# Patient Record
Sex: FEMALE | Race: WHITE | NOT HISPANIC OR LATINO | Employment: OTHER | URBAN - METROPOLITAN AREA
[De-identification: names, ages, dates, MRNs, and addresses within clinical notes are randomized per-mention and may not be internally consistent; named-entity substitution may affect disease eponyms.]

---

## 2018-10-04 ENCOUNTER — HOSPITAL ENCOUNTER (OUTPATIENT)
Dept: LAB | Facility: HOSPITAL | Age: 28
Discharge: HOME | End: 2018-10-04
Attending: NURSE PRACTITIONER | Admitting: NURSE PRACTITIONER

## 2018-10-04 LAB
25(OH)D3 SERPL-MCNC: 19 NG/ML (ref 30–100)
ALBUMIN SERPL-MCNC: 3.2 G/DL (ref 3.5–4.8)
ALBUMIN/GLOB SERPL: 1 {RATIO} (ref 1–1.7)
ALP SERPL-CCNC: 67 IU/L (ref 32–91)
ALT SERPL-CCNC: 20 IU/L (ref 14–54)
ANION GAP SERPL CALC-SCNC: 14.6 MMOL/L (ref 10–20)
AST SERPL-CCNC: 19 IU/L (ref 15–41)
BASOPHILS # BLD AUTO: 0.1 10*3/UL (ref 0–0.2)
BASOPHILS NFR BLD AUTO: 1 % (ref 0–2)
BILIRUB SERPL-MCNC: 0.5 MG/DL (ref 0.3–1.2)
BUN SERPL-MCNC: 10 MG/DL (ref 8–20)
BUN/CREAT SERPL: 14.3 (ref 5.4–26.2)
CALCIUM SERPL-MCNC: 9 MG/DL (ref 8.9–10.3)
CHLORIDE SERPL-SCNC: 102 MMOL/L (ref 101–111)
CHOLEST SERPL-MCNC: 206 MG/DL
CHOLEST/HDLC SERPL: 7.9 {RATIO}
CONV CO2: 27 MMOL/L (ref 22–32)
CONV LDL CHOLESTEROL DIRECT: 172 MG/DL (ref 0–100)
CONV TEST ORDERED:: NORMAL
CONV TOTAL PROTEIN: 6.4 G/DL (ref 6.1–7.9)
CREAT UR-MCNC: 0.7 MG/DL (ref 0.4–1)
DIFFERENTIAL METHOD BLD: (no result)
EOSINOPHIL # BLD AUTO: 0.1 10*3/UL (ref 0–0.3)
EOSINOPHIL # BLD AUTO: 1 % (ref 0–3)
ERYTHROCYTE [DISTWIDTH] IN BLOOD BY AUTOMATED COUNT: 16 % (ref 11.5–14.5)
ESTRADIOL SERPL HS-MCNC: 49 PG/ML
GLOBULIN UR ELPH-MCNC: 3.2 G/DL (ref 2.5–3.8)
GLUCOSE SERPL-MCNC: 187 MG/DL (ref 65–99)
HBA1C MFR BLD: 5.9 % (ref 0–5.6)
HBV SURFACE AG SERPL QL IA: NONREACTIVE
HCT VFR BLD AUTO: 39.4 % (ref 35–49)
HCV AB SER DONR QL: NONREACTIVE
HDLC SERPL-MCNC: 26 MG/DL
HGB BLD-MCNC: 12.7 G/DL (ref 12–15)
LDLC/HDLC SERPL: 6.5 {RATIO}
LIPID INTERPRETATION: ABNORMAL
LYMPHOCYTES # BLD AUTO: 2.2 10*3/UL (ref 0.8–4.8)
LYMPHOCYTES NFR BLD AUTO: 25 % (ref 18–42)
Lab: NORMAL
MCH RBC QN AUTO: 24.5 PG (ref 26–32)
MCHC RBC AUTO-ENTMCNC: 32.3 G/DL (ref 32–36)
MCV RBC AUTO: 75.7 FL (ref 80–94)
MONOCYTES # BLD AUTO: 0.3 10*3/UL (ref 0.1–1.3)
MONOCYTES NFR BLD AUTO: 3 % (ref 2–11)
NEUTROPHILS # BLD AUTO: 6.4 10*3/UL (ref 2.3–8.6)
NEUTROPHILS NFR BLD AUTO: 70 % (ref 50–75)
NRBC BLD AUTO-RTO: 0 /100{WBCS}
NRBC/RBC NFR BLD MANUAL: 0 10*3/UL
PLATELET # BLD AUTO: 366 10*3/UL (ref 150–450)
PMV BLD AUTO: 6.4 FL (ref 7.4–10.4)
POTASSIUM SERPL-SCNC: 3.6 MMOL/L (ref 3.6–5.1)
PROLACTIN SERPL-MCNC: 8 NG/ML (ref 3.3–27)
RBC # BLD AUTO: 5.21 10*6/UL (ref 4–5.4)
REF LAB TEST RESULTS: NORMAL
SODIUM SERPL-SCNC: 140 MMOL/L (ref 136–144)
T PALLIDUM IGG SER QL: NONREACTIVE
TRIGL SERPL-MCNC: 108 MG/DL
TSH SERPL-ACNC: 2.62 UIU/ML (ref 0.34–5.6)
VLDLC SERPL CALC-MCNC: 8.2 MG/DL
WBC # BLD AUTO: 9.1 10*3/UL (ref 4.5–11.5)

## 2018-10-05 LAB — CONV HIV-1/ HIV-2: NEGATIVE

## 2018-12-17 ENCOUNTER — HOSPITAL ENCOUNTER (OUTPATIENT)
Dept: ULTRASOUND IMAGING | Facility: HOSPITAL | Age: 28
Discharge: HOME | End: 2018-12-17
Attending: NURSE PRACTITIONER | Admitting: NURSE PRACTITIONER

## 2019-05-09 ENCOUNTER — HOSPITAL ENCOUNTER (OUTPATIENT)
Dept: ULTRASOUND IMAGING | Facility: HOSPITAL | Age: 29
Discharge: HOME | End: 2019-05-09
Attending: OBSTETRICS & GYNECOLOGY | Admitting: OBSTETRICS & GYNECOLOGY

## 2019-07-01 ENCOUNTER — OFFICE VISIT (OUTPATIENT)
Dept: FAMILY MEDICINE CLINIC | Facility: CLINIC | Age: 29
End: 2019-07-01

## 2019-07-01 VITALS
WEIGHT: 293 LBS | BODY MASS INDEX: 51.91 KG/M2 | SYSTOLIC BLOOD PRESSURE: 137 MMHG | DIASTOLIC BLOOD PRESSURE: 97 MMHG | HEIGHT: 63 IN | OXYGEN SATURATION: 95 % | HEART RATE: 73 BPM

## 2019-07-01 DIAGNOSIS — E66.01 CLASS 3 SEVERE OBESITY WITH BODY MASS INDEX (BMI) OF 45.0 TO 49.9 IN ADULT, UNSPECIFIED OBESITY TYPE, UNSPECIFIED WHETHER SERIOUS COMORBIDITY PRESENT (HCC): ICD-10-CM

## 2019-07-01 DIAGNOSIS — R53.83 FATIGUE, UNSPECIFIED TYPE: ICD-10-CM

## 2019-07-01 DIAGNOSIS — I10 HYPERTENSION, UNSPECIFIED TYPE: ICD-10-CM

## 2019-07-01 DIAGNOSIS — Z86.39 PERSONAL HISTORY OF OTHER ENDOCRINE, NUTRITIONAL AND METABOLIC DISEASE: ICD-10-CM

## 2019-07-01 DIAGNOSIS — F32.A DEPRESSION, UNSPECIFIED DEPRESSION TYPE: Primary | ICD-10-CM

## 2019-07-01 DIAGNOSIS — C55 MALIGNANT NEOPLASM OF UTERUS, UNSPECIFIED SITE (HCC): ICD-10-CM

## 2019-07-01 PROBLEM — F81.9 LEARNING DISABILITY: Status: ACTIVE | Noted: 2019-07-01

## 2019-07-01 PROCEDURE — 99204 OFFICE O/P NEW MOD 45 MIN: CPT | Performed by: NURSE PRACTITIONER

## 2019-07-01 RX ORDER — BUPROPION HYDROCHLORIDE 150 MG/1
150 TABLET ORAL DAILY
Qty: 30 TABLET | Refills: 0 | Status: SHIPPED | OUTPATIENT
Start: 2019-07-01 | End: 2019-07-25

## 2019-07-01 RX ORDER — MEGESTROL ACETATE 40 MG/1
2 TABLET ORAL 2 TIMES DAILY
Refills: 4 | COMMUNITY
Start: 2019-06-27 | End: 2020-04-19

## 2019-07-01 NOTE — PATIENT INSTRUCTIONS
Start taking wellbutrin daily  Work on increasing exercise  Work on portion control and eating well balanced diet  Call for worsening symptoms

## 2019-07-01 NOTE — PROGRESS NOTES
"Subjective   Cindy Thompson is a 28 y.o. female.     Pt is here today to establish care.  She has not had a PCP in years  She is , no children  She is unemloyeed  Pt states that she has a learning disability.  She completed high school but did not get her diploma  She reports being diagnosed with uterine cancer on 6/22.  She had a D & C for bleeding and cramping.  She sees Dr. Belle with [facility]  She goes to [facility] who started her on megace. (Dr. Mccauley)  They do not plan on removing the uterus at this time or starting chemo or radiation  She is supposed to be working on a diet and losing weight.  She reports that is very tough for her to diet.  She states that food is her \"drug.\"  She reports that her feet are swelling at night   She has been to the eye doctor within the last year  Occasional blurred vision  Has struggled with depression and has cut herself in 2017.  Not currently on any medication  Reports LLE edema at night           The following portions of the patient's history were reviewed and updated as appropriate: allergies, current medications, past family history, past medical history, past social history, past surgical history and problem list.    Review of Systems   Constitutional: Positive for appetite change (increase) and fatigue. Negative for chills and fever.   HENT: Negative for congestion, ear pain, hearing loss, postnasal drip, rhinorrhea, sinus pressure, sore throat and trouble swallowing.    Eyes: Positive for blurred vision. Negative for double vision, pain, discharge, itching and visual disturbance.   Respiratory: Negative for cough, chest tightness, shortness of breath and wheezing.    Cardiovascular: Positive for leg swelling. Negative for chest pain and palpitations.   Gastrointestinal: Positive for abdominal pain, constipation and nausea. Negative for blood in stool, diarrhea and vomiting.   Endocrine: Positive for polyphagia and polyuria. Negative for " "polydipsia.   Genitourinary: Positive for pelvic pain. Negative for dysuria, flank pain, frequency and urgency.   Musculoskeletal: Negative for arthralgias, back pain and myalgias.   Skin: Negative for rash and skin lesions.   Neurological: Positive for numbness (in manav feet). Negative for dizziness, weakness and headache.   Psychiatric/Behavioral: Positive for self-injury (has cut herself in 2017), suicidal ideas (at times.) and depressed mood. Negative for stress. The patient is not nervous/anxious.        Objective   /97 (BP Location: Left arm, Patient Position: Sitting, Cuff Size: Adult)   Pulse 73   Ht 160 cm (63\")   Wt (!) 159 kg (351 lb)   SpO2 95%   BMI 62.18 kg/m²   Physical Exam   Constitutional: She is oriented to person, place, and time. She appears well-developed.   obesity   HENT:   Head: Normocephalic and atraumatic.   Eyes: EOM are normal. Pupils are equal, round, and reactive to light.   Neck: Normal range of motion. Neck supple.   Cardiovascular: Normal rate, regular rhythm and normal heart sounds.   Pulmonary/Chest: Effort normal and breath sounds normal.   Abdominal: Soft. Bowel sounds are normal. There is tenderness.   Musculoskeletal: Normal range of motion. She exhibits edema (LLE trace edema).   Neurological: She is alert and oriented to person, place, and time.   Skin: Skin is warm and dry.   Psychiatric: Her speech is normal and behavior is normal. Judgment and thought content normal. Cognition and memory are normal. She exhibits a depressed mood.   Depressed affect   Nursing note and vitals reviewed.        Assessment/Plan   Problems Addressed this Visit        Cardiovascular and Mediastinum    Hypertension       Genitourinary    Uterine cancer (CMS/HCC)    Relevant Medications    megestrol (MEGACE) 40 MG tablet       Other    Depression - Primary    Relevant Medications    buPROPion XL (WELLBUTRIN XL) 150 MG 24 hr tablet      Other Visit Diagnoses     Class 3 severe obesity " with body mass index (BMI) of 45.0 to 49.9 in adult, unspecified obesity type, unspecified whether serious comorbidity present (CMS/[facility])        work on diet and exercise  obtain labs  set obtainable goals- exercise 10 minutes daily  wellbutrin      Relevant Orders    Lipid Panel    Hemoglobin A1c    TSH    Comprehensive Metabolic Panel    CBC & Differential    Fatigue, unspecified type        obtain labs    Relevant Orders    Lipid Panel    Hemoglobin A1c    TSH    Comprehensive Metabolic Panel    CBC & Differential    Personal history of other endocrine, nutritional and metabolic disease         labs    Relevant Orders    Hemoglobin A1c              Diagnoses and all orders for this visit:    1. Depression, unspecified depression type (Primary)  Comments:  start Wellbutrin.  Follow up in 3 weeks  Orders:  -     buPROPion XL (WELLBUTRIN XL) 150 MG 24 hr tablet; Take 1 tablet by mouth Daily.  Dispense: 30 tablet; Refill: 0    2. Class 3 severe obesity with body mass index (BMI) of 45.0 to 49.9 in adult, unspecified obesity type, unspecified whether serious comorbidity present (CMS/[facility])  Comments:  work on diet and exercise  obtain labs  set obtainable goals- exercise 10 minutes daily  wellbutrin    Orders:  -     Lipid Panel; Future  -     Hemoglobin A1c; Future  -     TSH; Future  -     Comprehensive Metabolic Panel; Future  -     CBC & Differential    3. Fatigue, unspecified type  Comments:  obtain labs  Orders:  -     Lipid Panel; Future  -     Hemoglobin A1c; Future  -     TSH; Future  -     Comprehensive Metabolic Panel; Future  -     CBC & Differential    4. Malignant neoplasm of uterus, unspecified site (CMS/[facility])  Comments:  [facility]    5. Hypertension, unspecified type  Comments:  monitor- may need to start medication    6. Personal history of other endocrine, nutritional and metabolic disease   Comments:  labs  Orders:  -     Hemoglobin A1c; Future

## 2019-07-03 ENCOUNTER — LAB (OUTPATIENT)
Dept: FAMILY MEDICINE CLINIC | Facility: CLINIC | Age: 29
End: 2019-07-03

## 2019-07-03 DIAGNOSIS — E66.01 CLASS 3 SEVERE OBESITY WITH BODY MASS INDEX (BMI) OF 45.0 TO 49.9 IN ADULT, UNSPECIFIED OBESITY TYPE, UNSPECIFIED WHETHER SERIOUS COMORBIDITY PRESENT (HCC): ICD-10-CM

## 2019-07-03 DIAGNOSIS — R53.83 FATIGUE, UNSPECIFIED TYPE: ICD-10-CM

## 2019-07-03 DIAGNOSIS — E03.9 HYPOTHYROIDISM, UNSPECIFIED TYPE: ICD-10-CM

## 2019-07-03 DIAGNOSIS — Z86.39 PERSONAL HISTORY OF OTHER ENDOCRINE, NUTRITIONAL AND METABOLIC DISEASE: ICD-10-CM

## 2019-07-03 LAB
ALBUMIN SERPL-MCNC: 3.3 G/DL (ref 3.5–4.8)
ALBUMIN/GLOB SERPL: 1 G/DL (ref 1–1.7)
ALP SERPL-CCNC: 61 U/L (ref 32–91)
ALT SERPL W P-5'-P-CCNC: 21 U/L (ref 14–54)
ANION GAP SERPL CALCULATED.3IONS-SCNC: 12.1 MMOL/L (ref 10–20)
ARTICHOKE IGE QN: 159 MG/DL (ref 0–100)
AST SERPL-CCNC: 18 U/L (ref 15–41)
BASOPHILS # BLD AUTO: 0.1 10*3/MM3 (ref 0–0.2)
BASOPHILS NFR BLD AUTO: 0.7 % (ref 0–1.5)
BILIRUB SERPL-MCNC: 0.3 MG/DL (ref 0.3–1.2)
BUN BLD-MCNC: 9 MG/DL (ref 8–20)
BUN/CREAT SERPL: 12.9 (ref 5.4–26.2)
CALCIUM SPEC-SCNC: 9.1 MG/DL (ref 8.9–10.3)
CHLORIDE SERPL-SCNC: 103 MMOL/L (ref 101–111)
CHOLEST SERPL-MCNC: 194 MG/DL
CO2 SERPL-SCNC: 26 MMOL/L (ref 22–32)
CREAT BLD-MCNC: 0.7 MG/DL (ref 0.4–1)
DEPRECATED RDW RBC AUTO: 48.1 FL (ref 37–54)
EOSINOPHIL # BLD AUTO: 0.2 10*3/MM3 (ref 0–0.4)
EOSINOPHIL NFR BLD AUTO: 1.6 % (ref 0.3–6.2)
ERYTHROCYTE [DISTWIDTH] IN BLOOD BY AUTOMATED COUNT: 17.5 % (ref 12.3–15.4)
GFR SERPL CREATININE-BSD FRML MDRD: 101 ML/MIN/1.73
GLOBULIN UR ELPH-MCNC: 3.4 GM/DL (ref 2.5–3.8)
GLUCOSE BLD-MCNC: 128 MG/DL (ref 65–99)
HBA1C MFR BLD: 6 % (ref 3.5–5.6)
HCT VFR BLD AUTO: 38.1 % (ref 34–46.6)
HDLC SERPL QL: 7.46
HDLC SERPL-MCNC: 26 MG/DL
HGB BLD-MCNC: 12.3 G/DL (ref 12–15.9)
LDLC/HDLC SERPL: 5.33 {RATIO}
LYMPHOCYTES # BLD AUTO: 1.8 10*3/MM3 (ref 0.7–3.1)
LYMPHOCYTES NFR BLD AUTO: 19.5 % (ref 19.6–45.3)
MCH RBC QN AUTO: 24.7 PG (ref 26.6–33)
MCHC RBC AUTO-ENTMCNC: 32.3 G/DL (ref 31.5–35.7)
MCV RBC AUTO: 76.7 FL (ref 79–97)
MONOCYTES # BLD AUTO: 0.3 10*3/MM3 (ref 0.1–0.9)
MONOCYTES NFR BLD AUTO: 3.7 % (ref 5–12)
NEUTROPHILS # BLD AUTO: 6.9 10*3/MM3 (ref 1.7–7)
NEUTROPHILS NFR BLD AUTO: 74.5 % (ref 42.7–76)
NRBC BLD AUTO-RTO: 0.2 /100 WBC (ref 0–0.2)
PLATELET # BLD AUTO: 373 10*3/MM3 (ref 140–450)
PMV BLD AUTO: 6.8 FL (ref 6–12)
POTASSIUM BLD-SCNC: 4.1 MMOL/L (ref 3.6–5.1)
PROT SERPL-MCNC: 6.7 G/DL (ref 6.1–7.9)
RBC # BLD AUTO: 4.97 10*6/MM3 (ref 3.77–5.28)
SODIUM BLD-SCNC: 137 MMOL/L (ref 136–144)
TRIGL SERPL-MCNC: 147 MG/DL
TSH SERPL DL<=0.05 MIU/L-ACNC: 7.6 MIU/ML (ref 0.34–5.6)
VLDLC SERPL-MCNC: 29.4 MG/DL
WBC NRBC COR # BLD: 9.3 10*3/MM3 (ref 3.4–10.8)

## 2019-07-03 PROCEDURE — 36415 COLL VENOUS BLD VENIPUNCTURE: CPT | Performed by: NURSE PRACTITIONER

## 2019-07-03 PROCEDURE — 84439 ASSAY OF FREE THYROXINE: CPT | Performed by: NURSE PRACTITIONER

## 2019-07-03 PROCEDURE — 80053 COMPREHEN METABOLIC PANEL: CPT | Performed by: NURSE PRACTITIONER

## 2019-07-03 PROCEDURE — 80061 LIPID PANEL: CPT | Performed by: NURSE PRACTITIONER

## 2019-07-03 PROCEDURE — 85025 COMPLETE CBC W/AUTO DIFF WBC: CPT | Performed by: NURSE PRACTITIONER

## 2019-07-03 PROCEDURE — 84443 ASSAY THYROID STIM HORMONE: CPT | Performed by: NURSE PRACTITIONER

## 2019-07-03 PROCEDURE — 83036 HEMOGLOBIN GLYCOSYLATED A1C: CPT | Performed by: NURSE PRACTITIONER

## 2019-07-05 DIAGNOSIS — E03.9 HYPOTHYROIDISM, UNSPECIFIED TYPE: Primary | ICD-10-CM

## 2019-07-05 LAB — T4 FREE SERPL-MCNC: 0.88 NG/DL (ref 0.58–1.64)

## 2019-07-05 RX ORDER — LEVOTHYROXINE SODIUM 0.05 MG/1
50 TABLET ORAL DAILY
Qty: 30 TABLET | Refills: 1 | Status: SHIPPED | OUTPATIENT
Start: 2019-07-05 | End: 2019-08-26

## 2019-07-25 ENCOUNTER — OFFICE VISIT (OUTPATIENT)
Dept: FAMILY MEDICINE CLINIC | Facility: CLINIC | Age: 29
End: 2019-07-25

## 2019-07-25 VITALS
BODY MASS INDEX: 51.91 KG/M2 | SYSTOLIC BLOOD PRESSURE: 149 MMHG | HEIGHT: 63 IN | WEIGHT: 293 LBS | DIASTOLIC BLOOD PRESSURE: 104 MMHG | OXYGEN SATURATION: 97 % | HEART RATE: 98 BPM

## 2019-07-25 DIAGNOSIS — I10 HYPERTENSION, UNSPECIFIED TYPE: ICD-10-CM

## 2019-07-25 DIAGNOSIS — R10.84 GENERALIZED ABDOMINAL PAIN: ICD-10-CM

## 2019-07-25 DIAGNOSIS — Z23 ENCOUNTER FOR VACCINATION: ICD-10-CM

## 2019-07-25 DIAGNOSIS — F32.A DEPRESSION, UNSPECIFIED DEPRESSION TYPE: Primary | ICD-10-CM

## 2019-07-25 PROBLEM — K08.9 TOOTH DISORDER: Status: ACTIVE | Noted: 2017-12-30

## 2019-07-25 PROBLEM — G43.909 HEADACHE, MIGRAINE: Status: ACTIVE | Noted: 2019-07-25

## 2019-07-25 LAB
BILIRUB BLD-MCNC: NEGATIVE MG/DL
CLARITY, POC: CLEAR
COLOR UR: YELLOW
GLUCOSE UR STRIP-MCNC: NEGATIVE MG/DL
KETONES UR QL: NEGATIVE
LEUKOCYTE EST, POC: NEGATIVE
NITRITE UR-MCNC: NEGATIVE MG/ML
PH UR: 5.5 [PH] (ref 5–8)
PROT UR STRIP-MCNC: ABNORMAL MG/DL
RBC # UR STRIP: NEGATIVE /UL
SP GR UR: 1.03 (ref 1–1.03)
UROBILINOGEN UR QL: NORMAL

## 2019-07-25 PROCEDURE — 90471 IMMUNIZATION ADMIN: CPT | Performed by: NURSE PRACTITIONER

## 2019-07-25 PROCEDURE — 99214 OFFICE O/P EST MOD 30 MIN: CPT | Performed by: NURSE PRACTITIONER

## 2019-07-25 PROCEDURE — 81003 URINALYSIS AUTO W/O SCOPE: CPT | Performed by: NURSE PRACTITIONER

## 2019-07-25 PROCEDURE — 90715 TDAP VACCINE 7 YRS/> IM: CPT | Performed by: NURSE PRACTITIONER

## 2019-07-25 RX ORDER — BUPROPION HYDROCHLORIDE 300 MG/1
300 TABLET ORAL DAILY
Qty: 30 TABLET | Refills: 2 | Status: SHIPPED | OUTPATIENT
Start: 2019-07-25 | End: 2020-02-09 | Stop reason: SDUPTHER

## 2019-07-25 RX ORDER — LABETALOL 100 MG/1
100 TABLET, FILM COATED ORAL 2 TIMES DAILY
Qty: 60 TABLET | Refills: 0 | Status: SHIPPED | OUTPATIENT
Start: 2019-07-25 | End: 2019-08-26

## 2019-07-25 NOTE — PATIENT INSTRUCTIONS
Check TSH (thyroid) during the week of 8/16   Drink plenty of water  Take over the counter stool softener (docusate sodium) twice daily  Try taking miralax twice daily  Prune juice is ok to take as well.  If nausea does not improve after constipation improves let me know  Start taking labetolol for elevated blood pressure  Reduce sodium intake  Tdap vaccine given today  Increase wellbutrin to 300mg daily

## 2019-07-25 NOTE — PROGRESS NOTES
"Subjective   Cindy Thompson is a 28 y.o. female.     Pt is here today to follow up on depression.  We started her on wellbutrin [id number] and she states that she has seen an improvement in her mood  She would be interested in increasing the dose  Denies thoughts of suicide  She has also been trying to work on her diet and increasing her exercise but she states that it has been \"hard\"  She has reduced her soda intake  She is down 9lbs since the last visit  She was started on synthroid for hypothyroidisms.  She is tolerating the synthroid well.  She is due to have her thyroid checked on 8/16  Pt states that she has been experiencing nausea on a daily basis.  She still has a good appetite.  It happens throughout the day  Pt states that she has been struggling with constipation and a burning sensation in her rectum with bowel movements.  This has been happening for about 3 weeks.  She states that she is consuming a large amount of water.  She was going to try prune juice  She was recently diagnosed with uterine cancer and sees Dr. Mccauley at [facility].  Next appt is in October.         The following portions of the patient's history were reviewed and updated as appropriate: allergies, current medications, past family history, past medical history, past social history, past surgical history and problem list.    Review of Systems   Constitutional: Positive for fatigue. Negative for appetite change, chills and fever.   HENT: Negative for congestion, ear pain, hearing loss, postnasal drip, rhinorrhea, sinus pressure, sore throat and trouble swallowing.    Eyes: Positive for blurred vision (occasional). Negative for double vision and discharge.   Respiratory: Negative for cough, chest tightness, shortness of breath and wheezing.    Cardiovascular: Negative for chest pain and palpitations.   Gastrointestinal: Positive for abdominal pain, constipation and nausea. Negative for blood in stool, diarrhea and vomiting. Abdominal " "distention: occasional.   Genitourinary: Negative for dysuria, flank pain, frequency and urgency.   Musculoskeletal: Negative for arthralgias, back pain and myalgias.   Neurological: Negative for dizziness, weakness, numbness and headache.   Psychiatric/Behavioral: Positive for depressed mood. Negative for sleep disturbance and stress. The patient is not nervous/anxious.        Objective   BP (!) 149/104 (BP Location: Other (Comment), Patient Position: Sitting, Cuff Size: Adult) Comment (BP Location): right wrist  Pulse 98   Ht 160 cm (63\")   Wt (!) 155 kg (342 lb)   SpO2 97%   BMI 60.58 kg/m²   Physical Exam   Constitutional: She is oriented to person, place, and time. She appears well-developed and well-nourished. No distress.   obese   HENT:   Head: Normocephalic and atraumatic.   Eyes: Conjunctivae and EOM are normal. Pupils are equal, round, and reactive to light. Right eye exhibits no discharge. Left eye exhibits no discharge.   Neck: Normal range of motion. Neck supple.   Cardiovascular: Normal rate and regular rhythm.   Pulmonary/Chest: Effort normal and breath sounds normal. No respiratory distress. She has no wheezes. She has no rales.   Abdominal: Soft. Normal appearance and bowel sounds are normal. She exhibits no distension and no mass. There is no tenderness.   Musculoskeletal: Normal range of motion.   Neurological: She is alert and oriented to person, place, and time.   Skin: Skin is warm and dry. She is not diaphoretic.   Psychiatric: She has a normal mood and affect. Her behavior is normal. Thought content normal.   Vitals reviewed.      Brief Urine Lab Results  (Last result in the past 365 days)      Color   Clarity   Blood   Leuk Est   Nitrite   Protein   CREAT   Urine HCG        07/25/19 0911 Yellow Clear Negative Negative Negative 30 mg/dL             Assessment/Plan   Problems Addressed this Visit        Cardiovascular and Mediastinum    Hypertension       Nervous and Auditory    " Abdominal pain    Relevant Orders    POC Urinalysis Dipstick, Automated (Completed)       Other    Depression - Primary      Other Visit Diagnoses     Encounter for vaccination                  Diagnoses and all orders for this visit:    1. Depression, unspecified depression type (Primary)  Comments:  improved  increase wellbutrin to 300 mg daily    2. Hypertension, unspecified type  Comments:  start labetolol 100 mg BID  reduce sodium intake    3. Generalized abdominal pain  Comments:  check UA  most likely constipation  start miralax, colace, prune juice and drink water  Orders:  -     POC Urinalysis Dipstick, Automated    4. Encounter for vaccination  -     Tdap Vaccine Greater Than or Equal To 8yo IM      I called [facility] and left voicemail wit their NP to discuss possible other options for treatment other than Megace.  Pt reports that she is constantly hungry on the medication.  Awaiting return call.

## 2019-07-29 ENCOUNTER — TELEPHONE (OUTPATIENT)
Dept: FAMILY MEDICINE CLINIC | Facility: CLINIC | Age: 29
End: 2019-07-29

## 2019-07-29 NOTE — TELEPHONE ENCOUNTER
Pt called, wellbutrin & labetalol are too expensive through insurance. I spoke w/ pt and printed goodrx card for pt to . They are up front.

## 2019-08-17 ENCOUNTER — HOSPITAL ENCOUNTER (EMERGENCY)
Facility: HOSPITAL | Age: 29
Discharge: HOME | End: 2019-08-17
Admitting: EMERGENCY MEDICINE

## 2019-08-17 ENCOUNTER — APPOINTMENT (OUTPATIENT)
Dept: CT IMAGING | Facility: HOSPITAL | Age: 29
End: 2019-08-17

## 2019-08-17 VITALS
WEIGHT: 293 LBS | OXYGEN SATURATION: 97 % | HEART RATE: 88 BPM | DIASTOLIC BLOOD PRESSURE: 87 MMHG | SYSTOLIC BLOOD PRESSURE: 128 MMHG | BODY MASS INDEX: 51.91 KG/M2 | HEIGHT: 63 IN | RESPIRATION RATE: 17 BRPM | TEMPERATURE: 98.3 F

## 2019-08-17 DIAGNOSIS — R11.0 NAUSEA: ICD-10-CM

## 2019-08-17 DIAGNOSIS — K92.1 HEMATOCHEZIA: ICD-10-CM

## 2019-08-17 DIAGNOSIS — R10.84 GENERALIZED ABDOMINAL PAIN: Primary | ICD-10-CM

## 2019-08-17 LAB
ALBUMIN SERPL-MCNC: 3.5 G/DL (ref 3.5–4.8)
ALBUMIN/GLOB SERPL: 1 G/DL (ref 1–1.7)
ALP SERPL-CCNC: 71 U/L (ref 32–91)
ALT SERPL W P-5'-P-CCNC: 17 U/L (ref 14–54)
ANION GAP SERPL CALCULATED.3IONS-SCNC: 14.5 MMOL/L (ref 5–15)
AST SERPL-CCNC: 15 U/L (ref 15–41)
B-HCG UR QL: NEGATIVE
BACTERIA UR QL AUTO: ABNORMAL /HPF
BASOPHILS # BLD AUTO: 0.1 10*3/MM3 (ref 0–0.2)
BASOPHILS NFR BLD AUTO: 1.1 % (ref 0–1.5)
BILIRUB SERPL-MCNC: 0.7 MG/DL (ref 0.3–1.2)
BILIRUB UR QL STRIP: ABNORMAL
BUN BLD-MCNC: 8 MG/DL (ref 8–20)
BUN/CREAT SERPL: 8 (ref 5.4–26.2)
CALCIUM SPEC-SCNC: 9.5 MG/DL (ref 8.9–10.3)
CHLORIDE SERPL-SCNC: 102 MMOL/L (ref 101–111)
CLARITY UR: CLEAR
CO2 SERPL-SCNC: 26 MMOL/L (ref 22–32)
COLOR UR: YELLOW
CREAT BLD-MCNC: 1 MG/DL (ref 0.4–1)
DEPRECATED RDW RBC AUTO: 44.2 FL (ref 37–54)
EOSINOPHIL # BLD AUTO: 0.1 10*3/MM3 (ref 0–0.4)
EOSINOPHIL NFR BLD AUTO: 1.4 % (ref 0.3–6.2)
ERYTHROCYTE [DISTWIDTH] IN BLOOD BY AUTOMATED COUNT: 16.6 % (ref 12.3–15.4)
GFR SERPL CREATININE-BSD FRML MDRD: 67 ML/MIN/1.73
GLOBULIN UR ELPH-MCNC: 3.5 GM/DL (ref 2.5–3.8)
GLUCOSE BLD-MCNC: 130 MG/DL (ref 65–99)
GLUCOSE UR STRIP-MCNC: NEGATIVE MG/DL
HCT VFR BLD AUTO: 40.3 % (ref 34–46.6)
HGB BLD-MCNC: 12.9 G/DL (ref 12–15.9)
HGB UR QL STRIP.AUTO: ABNORMAL
HOLD SPECIMEN: NORMAL
HOLD SPECIMEN: NORMAL
HYALINE CASTS UR QL AUTO: ABNORMAL /LPF
KETONES UR QL STRIP: ABNORMAL
LEUKOCYTE ESTERASE UR QL STRIP.AUTO: NEGATIVE
LIPASE SERPL-CCNC: 27 U/L (ref 22–51)
LYMPHOCYTES # BLD AUTO: 2.3 10*3/MM3 (ref 0.7–3.1)
LYMPHOCYTES NFR BLD AUTO: 22.9 % (ref 19.6–45.3)
MCH RBC QN AUTO: 24.2 PG (ref 26.6–33)
MCHC RBC AUTO-ENTMCNC: 32 G/DL (ref 31.5–35.7)
MCV RBC AUTO: 75.7 FL (ref 79–97)
MONOCYTES # BLD AUTO: 0.5 10*3/MM3 (ref 0.1–0.9)
MONOCYTES NFR BLD AUTO: 4.9 % (ref 5–12)
MUCOUS THREADS URNS QL MICRO: ABNORMAL /HPF
NEUTROPHILS # BLD AUTO: 7 10*3/MM3 (ref 1.7–7)
NEUTROPHILS NFR BLD AUTO: 69.7 % (ref 42.7–76)
NITRITE UR QL STRIP: NEGATIVE
NRBC BLD AUTO-RTO: 0 /100 WBC (ref 0–0.2)
PH UR STRIP.AUTO: 6 [PH] (ref 5–8)
PLATELET # BLD AUTO: 381 10*3/MM3 (ref 140–450)
PMV BLD AUTO: 6.4 FL (ref 6–12)
POTASSIUM BLD-SCNC: 3.5 MMOL/L (ref 3.6–5.1)
PROT SERPL-MCNC: 7 G/DL (ref 6.1–7.9)
PROT UR QL STRIP: ABNORMAL
RBC # BLD AUTO: 5.32 10*6/MM3 (ref 3.77–5.28)
RBC # UR: ABNORMAL /HPF
REF LAB TEST METHOD: ABNORMAL
SODIUM BLD-SCNC: 139 MMOL/L (ref 136–144)
SP GR UR STRIP: 1.04 (ref 1–1.03)
SQUAMOUS #/AREA URNS HPF: ABNORMAL /HPF
UROBILINOGEN UR QL STRIP: ABNORMAL
WBC NRBC COR # BLD: 10 10*3/MM3 (ref 3.4–10.8)
WBC UR QL AUTO: ABNORMAL /HPF
WHOLE BLOOD HOLD SPECIMEN: NORMAL
WHOLE BLOOD HOLD SPECIMEN: NORMAL

## 2019-08-17 PROCEDURE — 25010000002 ONDANSETRON PER 1 MG: Performed by: NURSE PRACTITIONER

## 2019-08-17 PROCEDURE — 87086 URINE CULTURE/COLONY COUNT: CPT | Performed by: NURSE PRACTITIONER

## 2019-08-17 PROCEDURE — 96372 THER/PROPH/DIAG INJ SC/IM: CPT

## 2019-08-17 PROCEDURE — 85025 COMPLETE CBC W/AUTO DIFF WBC: CPT | Performed by: NURSE PRACTITIONER

## 2019-08-17 PROCEDURE — 0 IOPAMIDOL PER 1 ML: Performed by: NURSE PRACTITIONER

## 2019-08-17 PROCEDURE — 25010000002 DICYCLOMINE PER 20 MG: Performed by: NURSE PRACTITIONER

## 2019-08-17 PROCEDURE — 96361 HYDRATE IV INFUSION ADD-ON: CPT

## 2019-08-17 PROCEDURE — 81025 URINE PREGNANCY TEST: CPT | Performed by: NURSE PRACTITIONER

## 2019-08-17 PROCEDURE — 74177 CT ABD & PELVIS W/CONTRAST: CPT

## 2019-08-17 PROCEDURE — 83690 ASSAY OF LIPASE: CPT | Performed by: NURSE PRACTITIONER

## 2019-08-17 PROCEDURE — 99284 EMERGENCY DEPT VISIT MOD MDM: CPT

## 2019-08-17 PROCEDURE — 80053 COMPREHEN METABOLIC PANEL: CPT | Performed by: NURSE PRACTITIONER

## 2019-08-17 PROCEDURE — P9612 CATHETERIZE FOR URINE SPEC: HCPCS

## 2019-08-17 PROCEDURE — 81001 URINALYSIS AUTO W/SCOPE: CPT | Performed by: NURSE PRACTITIONER

## 2019-08-17 PROCEDURE — 96374 THER/PROPH/DIAG INJ IV PUSH: CPT

## 2019-08-17 RX ORDER — OMEPRAZOLE 40 MG/1
40 CAPSULE, DELAYED RELEASE ORAL DAILY
Qty: 14 CAPSULE | Refills: 0 | Status: SHIPPED | OUTPATIENT
Start: 2019-08-17 | End: 2019-11-21

## 2019-08-17 RX ORDER — ONDANSETRON 2 MG/ML
4 INJECTION INTRAMUSCULAR; INTRAVENOUS ONCE
Status: COMPLETED | OUTPATIENT
Start: 2019-08-17 | End: 2019-08-17

## 2019-08-17 RX ORDER — SODIUM CHLORIDE 0.9 % (FLUSH) 0.9 %
10 SYRINGE (ML) INJECTION AS NEEDED
Status: DISCONTINUED | OUTPATIENT
Start: 2019-08-17 | End: 2019-08-17 | Stop reason: HOSPADM

## 2019-08-17 RX ORDER — DICYCLOMINE HYDROCHLORIDE 10 MG/ML
20 INJECTION INTRAMUSCULAR ONCE
Status: COMPLETED | OUTPATIENT
Start: 2019-08-17 | End: 2019-08-17

## 2019-08-17 RX ORDER — ONDANSETRON 4 MG/1
4 TABLET, ORALLY DISINTEGRATING ORAL EVERY 8 HOURS PRN
Qty: 10 TABLET | Refills: 0 | Status: SHIPPED | OUTPATIENT
Start: 2019-08-17 | End: 2020-04-19

## 2019-08-17 RX ORDER — DICYCLOMINE HCL 20 MG
20 TABLET ORAL EVERY 6 HOURS PRN
Qty: 20 TABLET | Refills: 0 | Status: SHIPPED | OUTPATIENT
Start: 2019-08-17 | End: 2020-09-23

## 2019-08-17 RX ADMIN — SODIUM CHLORIDE 1000 ML: 900 INJECTION, SOLUTION INTRAVENOUS at 16:43

## 2019-08-17 RX ADMIN — DICYCLOMINE HYDROCHLORIDE 20 MG: 20 INJECTION, SOLUTION INTRAMUSCULAR at 16:42

## 2019-08-17 RX ADMIN — ONDANSETRON 4 MG: 2 INJECTION INTRAMUSCULAR; INTRAVENOUS at 16:42

## 2019-08-17 RX ADMIN — IOPAMIDOL 100 ML: 755 INJECTION, SOLUTION INTRAVENOUS at 18:36

## 2019-08-17 NOTE — DISCHARGE INSTRUCTIONS
Take Zofran for nausea, Bentyl for abdominal cramping, and omeprazole as directed.  Take MiraLAX and fiber supplement over-the-counter every morning with stool softeners.  Drink lots of water and stay active.  Follow-up with your PCP in 3 days for recheck.  Follow-up with gastroenterology, as discussed.  Return to the ER for new or worsening symptoms.

## 2019-08-17 NOTE — ED PROVIDER NOTES
Subjective   History of Present Illness    Review of Systems    Past Medical History:   Diagnosis Date   • Depression    • Learning disabilities    • Low back pain    • Uterine cancer (CMS/HCC)     [facility]- Dr. Eva Belle       No Known Allergies    Past Surgical History:   Procedure Laterality Date   • DILATATION AND CURETTAGE     • TONSILLECTOMY         Family History   Problem Relation Age of Onset   • Cancer Father    • Hyperlipidemia Father    • Stroke Father    • Depression Sister    • Learning disabilities Sister    • Diabetes Maternal Grandmother        Social History     Socioeconomic History   • Marital status:      Spouse name: Not on file   • Number of children: Not on file   • Years of education: Not on file   • Highest education level: Not on file   Tobacco Use   • Smoking status: Former Smoker     Last attempt to quit:      Years since quittin.6   • Smokeless tobacco: Never Used   Substance and Sexual Activity   • Alcohol use: No     Frequency: Never     Comment: used to drink weekly   • Drug use: No   • Sexual activity: Yes     Partners: Male     Birth control/protection: None           Objective   Physical Exam    Procedures           ED Course      Ct Abdomen Pelvis With Contrast    Result Date: 2019   1. No acute abdominal or pelvic abnormality. 2. Mild cardiomegaly. 3. Solitary enlarged pericaval lymph node, likely reactive.  Electronically Signed By-Zurdo Brady On:2019 6:43 PM This report was finalized on [id number] by  Zurdo Brady, .    Medications   sodium chloride 0.9 % flush 10 mL (not administered)   sodium chloride 0.9 % bolus 1,000 mL (0 mL Intravenous Stopped 19 1758)   ondansetron (ZOFRAN) injection 4 mg (4 mg Intravenous Given 19 1642)   dicyclomine (BENTYL) injection 20 mg (20 mg Intramuscular Given 19 1642)   iopamidol (ISOVUE-370) 76 % injection 100 mL (100 mL Intravenous Given 19 1836)     Labs Reviewed   COMPREHENSIVE  METABOLIC PANEL - Abnormal; Notable for the following components:       Result Value    Glucose 130 (*)     Potassium 3.5 (*)     All other components within normal limits   URINALYSIS W/ CULTURE IF INDICATED - Abnormal; Notable for the following components:    Specific Gravity, UA 1.043 (*)     Ketones, UA Trace (*)     Bilirubin, UA Small (1+) (*)     Blood, UA Small (1+) (*)     Protein, UA 30 mg/dL (1+) (*)     All other components within normal limits   CBC WITH AUTO DIFFERENTIAL - Abnormal; Notable for the following components:    RBC 5.32 (*)     MCV 75.7 (*)     MCH 24.2 (*)     RDW 16.6 (*)     Monocyte % 4.9 (*)     All other components within normal limits   URINALYSIS, MICROSCOPIC ONLY - Abnormal; Notable for the following components:    WBC, UA 6-12 (*)     Bacteria, UA Trace (*)     Mucus, UA Large/3+ (*)     All other components within normal limits   LIPASE - Normal   PREGNANCY, URINE - Normal   URINE CULTURE   RAINBOW DRAW    Narrative:     The following orders were created for panel order [place] Draw.  Procedure                               Abnormality         Status                     ---------                               -----------         ------                     Light Blue Top[[id number]]                                   Final result               Green Top (Gel)[[id number]]                                  Final result               Lavender Top[[id number]]                                     Final result               Gold Top - SST[[id number]]                                   Final result                 Please view results for these tests on the individual orders.   CBC AND DIFFERENTIAL    Narrative:     The following orders were created for panel order CBC & Differential.  Procedure                               Abnormality         Status                     ---------                               -----------         ------                     CBC Auto Differential[[id number]]         Abnormal            Final result                 Please view results for these tests on the individual orders.   LIGHT BLUE TOP   GREEN TOP   LAVENDER TOP   GOLD TOP - RUST                 MDM  Number of Diagnoses or Management Options  Generalized abdominal pain:   Hematochezia:   Nausea:   Diagnosis management comments: Chart Review:  Comorbidity:  Imaging: Was interpreted by physician and reviewed by myself: Ct Abdomen Pelvis With Contrast Result Date: 8/17/2019   1. No acute abdominal or pelvic abnormality. 2. Mild cardiomegaly. 3. Solitary enlarged pericaval lymph node, likely reactive.    Disposition/Treatment: Discussed results with patient, verbalized understanding.  Agreeable with plan of care.    Patient undressed and placed in gown for exam.  Patient given 1 L bolus, Zofran 4 mg IV, and Bentyl 20 mg IM.  CT abdomen pelvis obtained.  Patient given follow-up with gastroenterology in the next week.  Patient given prescriptions for omeprazole, Bentyl, and Zofran.  Also instructed to take MiraLAX and fiber supplement with a stool softener once daily.  Patient instructed to return to the ER for new or worsening symptoms.         Amount and/or Complexity of Data Reviewed  Clinical lab tests: reviewed  Tests in the radiology section of CPT®: reviewed          Final diagnoses:   Generalized abdominal pain   Nausea   Hematochezia            Myriam Joseph, NP  08/17/19 6188

## 2019-08-19 LAB — BACTERIA SPEC AEROBE CULT: NO GROWTH

## 2019-08-26 ENCOUNTER — OFFICE VISIT (OUTPATIENT)
Dept: FAMILY MEDICINE CLINIC | Facility: CLINIC | Age: 29
End: 2019-08-26

## 2019-08-26 ENCOUNTER — LAB (OUTPATIENT)
Dept: FAMILY MEDICINE CLINIC | Facility: CLINIC | Age: 29
End: 2019-08-26

## 2019-08-26 VITALS
HEIGHT: 63 IN | OXYGEN SATURATION: 98 % | WEIGHT: 293 LBS | SYSTOLIC BLOOD PRESSURE: 137 MMHG | DIASTOLIC BLOOD PRESSURE: 98 MMHG | HEART RATE: 104 BPM | BODY MASS INDEX: 51.91 KG/M2

## 2019-08-26 DIAGNOSIS — E03.9 HYPOTHYROIDISM, UNSPECIFIED TYPE: Primary | ICD-10-CM

## 2019-08-26 DIAGNOSIS — E03.9 HYPOTHYROIDISM, UNSPECIFIED TYPE: ICD-10-CM

## 2019-08-26 DIAGNOSIS — I10 HYPERTENSION, UNSPECIFIED TYPE: Primary | ICD-10-CM

## 2019-08-26 DIAGNOSIS — F32.A DEPRESSION, UNSPECIFIED DEPRESSION TYPE: ICD-10-CM

## 2019-08-26 DIAGNOSIS — R10.2 PELVIC CRAMPING: ICD-10-CM

## 2019-08-26 PROBLEM — J02.9 SORE THROAT: Status: ACTIVE | Noted: 2018-02-05

## 2019-08-26 PROBLEM — Z32.00 POSSIBLE PREGNANCY, NOT CONFIRMED: Status: ACTIVE | Noted: 2018-02-05

## 2019-08-26 PROBLEM — Z32.00 POSSIBLE PREGNANCY, NOT CONFIRMED: Status: RESOLVED | Noted: 2018-02-05 | Resolved: 2019-08-26

## 2019-08-26 LAB
BILIRUB BLD-MCNC: NEGATIVE MG/DL
CLARITY, POC: CLEAR
COLOR UR: YELLOW
GLUCOSE UR STRIP-MCNC: NEGATIVE MG/DL
KETONES UR QL: NEGATIVE
LEUKOCYTE EST, POC: NEGATIVE
NITRITE UR-MCNC: NEGATIVE MG/ML
PH UR: 5.5 [PH] (ref 5–8)
PROT UR STRIP-MCNC: ABNORMAL MG/DL
RBC # UR STRIP: ABNORMAL /UL
SP GR UR: 1.02 (ref 1–1.03)
TSH SERPL DL<=0.05 MIU/L-ACNC: 4.54 MIU/ML (ref 0.34–5.6)
UROBILINOGEN UR QL: NORMAL

## 2019-08-26 PROCEDURE — 84443 ASSAY THYROID STIM HORMONE: CPT | Performed by: NURSE PRACTITIONER

## 2019-08-26 PROCEDURE — 36415 COLL VENOUS BLD VENIPUNCTURE: CPT

## 2019-08-26 PROCEDURE — 99214 OFFICE O/P EST MOD 30 MIN: CPT | Performed by: NURSE PRACTITIONER

## 2019-08-26 PROCEDURE — 81003 URINALYSIS AUTO W/O SCOPE: CPT | Performed by: NURSE PRACTITIONER

## 2019-08-26 RX ORDER — METOPROLOL SUCCINATE 50 MG/1
50 TABLET, EXTENDED RELEASE ORAL DAILY
Qty: 30 TABLET | Refills: 2 | Status: SHIPPED | OUTPATIENT
Start: 2019-08-26 | End: 2019-11-21 | Stop reason: SDUPTHER

## 2019-08-26 RX ORDER — LEVOTHYROXINE SODIUM 0.07 MG/1
75 TABLET ORAL DAILY
Qty: 30 TABLET | Refills: 1 | Status: SHIPPED | OUTPATIENT
Start: 2019-08-26 | End: 2019-11-24

## 2019-08-26 NOTE — PROGRESS NOTES
Subjective   Cindy Thompson is a 29 y.o. female.     Pt is here today to follow up on HTN, depression, and abdominal discomfort.  HTN- pt was prescribed labetolol 100mg BID.  She reports that she is inconsistent taking the medication regularly.  She always gets her first dose in, but does not get the second dose in regularly.  She forgets it more than she takes it.  Her BP is not stable at this time.  She has not had any BP medication today    Depression-  Pt reports that her depression symptoms are much better since increasing her wellbutrin to 300mg. She has more motivation to get up and move.  She is happy with her current dose.  She is not experiencing any side effects or thoughts of hurting self or others.  She is sleeping well.    She is still trying to lose weight.  She has decreased her soda intake and is trying to decrease her portion sizes.  She has not been getting much activity in due to her cramping.  She states that it hurts more the more she is up moving.  She has not gained or lost any weight since her last visit.    Pt reports that she has been having some abdominal discomfort.  She is currently on her menstrual cycle and experiencing extreme cramping.  She has been taking advil and midol, which have not helped.  She has been on her cycle for about 2 weeks.  She was experiencing cramping prior to her cycle.  She has an appointment with Dr. Greenberg on Sept 3rd.    She also reports that her constipation has improved.  She has been taking miralax and prune juice.  She is having a BM 5-6 times a day.  Her stools are formed.         The following portions of the patient's history were reviewed and updated as appropriate: allergies, current medications, past family history, past medical history, past social history, past surgical history and problem list.    Review of Systems   Constitutional: Negative for chills, fatigue and fever.   Respiratory: Negative for chest tightness and shortness of breath.   "  Cardiovascular: Negative for chest pain and palpitations.   Gastrointestinal: Positive for nausea. Negative for abdominal pain and constipation (improved).   Genitourinary: Positive for pelvic pain and pelvic pressure. Negative for dysuria, frequency and urgency.   Musculoskeletal: Negative for arthralgias and myalgias.   Neurological: Positive for weakness (legs feel more weak). Negative for dizziness, light-headedness and headache.   Psychiatric/Behavioral: Negative for self-injury, sleep disturbance, suicidal ideas, depressed mood and stress. The patient is not nervous/anxious.        Objective   /98 (BP Location: Left arm, Patient Position: Sitting, Cuff Size: Adult)   Pulse 104   Ht 160 cm (63\")   Wt (!) 156 kg (343 lb)   SpO2 98%   BMI 60.76 kg/m²   Physical Exam   Constitutional: She is oriented to person, place, and time. She appears well-developed and well-nourished. No distress.   obese   HENT:   Head: Normocephalic and atraumatic.   Cardiovascular: Normal rate and regular rhythm.   Pulmonary/Chest: Breath sounds normal. No respiratory distress.   Abdominal: Soft. Bowel sounds are normal. She exhibits no mass. There is no tenderness.   Neurological: She is alert and oriented to person, place, and time.   Skin: Skin is warm and dry.   Psychiatric: She has a normal mood and affect. Her behavior is normal. Judgment and thought content normal.         Assessment/Plan   Problems Addressed this Visit        Cardiovascular and Mediastinum    Hypertension - Primary    Relevant Medications    metoprolol succinate XL (TOPROL XL) 50 MG 24 hr tablet       Other    Depression      Other Visit Diagnoses     Pelvic cramping        Currently on menstrual cycle  Sees GYN next week  UA does not indicate infection  Repeat when off menstrual cycle    Relevant Orders    POC Urinalysis Dipstick, Automated (Completed)              Diagnoses and all orders for this visit:    1. Hypertension, unspecified type " (Primary)  Comments:  Pt having issues remembering to take both doses of labetalol  We will start metoprolol XL.    Avoiding other drug classes d/t chance of pregnancy   call w/ BP  Orders:  -     metoprolol succinate XL (TOPROL XL) 50 MG 24 hr tablet; Take 1 tablet by mouth Daily.  Dispense: 30 tablet; Refill: 2    2. Depression, unspecified depression type  Comments:  Improved  Continue current dose of Wellbutrin      3. Pelvic cramping  Comments:  Currently on menstrual cycle  Sees GYN next week  UA does not indicate infection  Repeat when off menstrual cycle  Orders:  -     POC Urinalysis Dipstick, Automated      Brief Urine Lab Results  (Last result in the past 365 days)      Color   Clarity   Blood   Leuk Est   Nitrite   Protein   CREAT   Urine HCG        08/26/19 1137 Yellow Clear Trace Negative Negative Trace             Patient to come in when off menstrual cycle for repeat urine.

## 2019-08-26 NOTE — PATIENT INSTRUCTIONS
Stop taking labetolol and start taking metoprolol XL  Call [facility] at [phone], option 3, then option 1 in 2 weeks with some BP readings  Follow up with gyn- have discussion in regards to birth control  Come in for repeat UA when off of cycle  Obtain labs today

## 2019-11-12 ENCOUNTER — OFFICE VISIT (OUTPATIENT)
Dept: FAMILY MEDICINE CLINIC | Facility: CLINIC | Age: 29
End: 2019-11-12

## 2019-11-12 VITALS
SYSTOLIC BLOOD PRESSURE: 115 MMHG | HEIGHT: 63 IN | DIASTOLIC BLOOD PRESSURE: 82 MMHG | BODY MASS INDEX: 51.91 KG/M2 | WEIGHT: 293 LBS | HEART RATE: 95 BPM | OXYGEN SATURATION: 97 %

## 2019-11-12 DIAGNOSIS — R05.9 COUGH: Primary | ICD-10-CM

## 2019-11-12 DIAGNOSIS — E66.01 MORBID OBESITY WITH BMI OF 60.0-69.9, ADULT (HCC): ICD-10-CM

## 2019-11-12 DIAGNOSIS — G47.33 OSA (OBSTRUCTIVE SLEEP APNEA): ICD-10-CM

## 2019-11-12 PROCEDURE — 99213 OFFICE O/P EST LOW 20 MIN: CPT | Performed by: NURSE PRACTITIONER

## 2019-11-12 RX ORDER — IBUPROFEN 600 MG/1
600 TABLET ORAL EVERY 6 HOURS PRN
COMMUNITY
End: 2020-04-19

## 2019-11-12 NOTE — PATIENT INSTRUCTIONS
Complete CXR  Go to the ER for any SOA  Referral to weight loss surgery  Referral to sleep doctor  No eating within 2 hours of bedtime.

## 2019-11-12 NOTE — PROGRESS NOTES
Subjective   Cindy Thompson is a 29 y.o. female.     Pt is here today to follow up after having some hypoxia s/p a hysteroscopy with D&C [facility]  Dr. Greer (anesthesiologist) called our office and stated that the patient's oxygen desaturated during her procedure and they had diffucilty getting her intubated and her oxygen levels back up.  He states that she stabilized, but after the procedure she had significant bronchospasms.    They were able to get her oxygen saturation back in normal range, but when he suctioned her he stated that's he had some thick yellow mucus that he was concerned about.  He wanted us to evaluate her to rule out pneumonia.  Pt reports that for the last few months she feels like she is choking at times after she eats or when she is sleeping.  She reports that's he is chewing her food well.   Pt has lost 3lbs since her last visit, but still needs to lose a significant amount of weight.  She reports that Dr. Mccauley mentioned possible weight loss surgery.  Pt is willing to see a weight loss surgeon.           The following portions of the patient's history were reviewed and updated as appropriate: allergies, current medications, past family history, past medical history, past social history, past surgical history and problem list.    Review of Systems   Constitutional: Negative for appetite change, chills, fatigue and fever.   HENT: Positive for sore throat (from intubation tube). Negative for congestion, ear pain, hearing loss, postnasal drip, rhinorrhea, sinus pressure, swollen glands and trouble swallowing.    Eyes: Negative for blurred vision, double vision, pain, discharge, itching and visual disturbance.   Respiratory: Positive for cough (improving). Negative for chest tightness, shortness of breath and wheezing.    Cardiovascular: Negative for chest pain and palpitations.   Gastrointestinal: Positive for nausea. Negative for abdominal pain, blood in stool, constipation, diarrhea  "and vomiting.   Genitourinary: Positive for pelvic pain (had procedure this morning).   Musculoskeletal: Negative for arthralgias, back pain and myalgias.   Skin: Negative for rash and skin lesions.   Neurological: Negative for dizziness, weakness, numbness and headache.   Psychiatric/Behavioral: Negative for depressed mood. The patient is not nervous/anxious.        Objective   /82 (BP Location: Right arm, Patient Position: Sitting, Cuff Size: Large Adult)   Pulse 95   Ht 160 cm (63\")   Wt (!) 154 kg (340 lb)   SpO2 97%   BMI 60.23 kg/m²   Physical Exam   Constitutional: She is oriented to person, place, and time. She appears well-developed and well-nourished.   Morbidly obese   HENT:   Head: Normocephalic and atraumatic.   Neck: Normal range of motion. Neck supple.   Cardiovascular: Normal rate, regular rhythm and normal heart sounds.   Pulmonary/Chest: Effort normal and breath sounds normal. No respiratory distress. She has no wheezes. She has no rales. She exhibits no tenderness.   Abdominal: Soft. Bowel sounds are normal.   Musculoskeletal: Normal range of motion.   Neurological: She is alert and oriented to person, place, and time.   Skin: Skin is warm and dry.   Psychiatric: She has a normal mood and affect. Her behavior is normal. Judgment and thought content normal.         Assessment/Plan     Diagnoses and all orders for this visit:    1. Cough (Primary)  Comments:  s/p intubation for procedure today  check CXR  lungs clear  Orders:  -     XR Chest PA & Lateral; Future    2. Morbid obesity with BMI of 60.0-69.9, adult (CMS/[facility])  Comments:  referral to weight managment  Orders:  -     Ambulatory Referral to Weight Management Program    3. BEBA (obstructive sleep apnea)  Comments:  referral to sleep doctor for new CPAP  Orders:  -     Ambulatory Referral to Sleep Medicine                "

## 2019-11-13 ENCOUNTER — TELEPHONE (OUTPATIENT)
Dept: FAMILY MEDICINE CLINIC | Facility: CLINIC | Age: 29
End: 2019-11-13

## 2019-11-13 DIAGNOSIS — I51.7 CARDIOMEGALY: Primary | ICD-10-CM

## 2019-11-13 NOTE — TELEPHONE ENCOUNTER
Pts CXR does show that her heart is enlarged.  This could be due to her sleep apnea.  I want to get an echo of her heart.

## 2019-11-18 ENCOUNTER — HOSPITAL ENCOUNTER (OUTPATIENT)
Dept: CARDIOLOGY | Facility: HOSPITAL | Age: 29
Discharge: HOME | End: 2019-11-18
Admitting: NURSE PRACTITIONER

## 2019-11-18 VITALS
DIASTOLIC BLOOD PRESSURE: 92 MMHG | HEIGHT: 63 IN | SYSTOLIC BLOOD PRESSURE: 134 MMHG | WEIGHT: 293 LBS | BODY MASS INDEX: 51.91 KG/M2

## 2019-11-18 DIAGNOSIS — I51.7 CARDIOMEGALY: ICD-10-CM

## 2019-11-18 LAB
BH CV ECHO MEAS - AO MAX PG (FULL): 0.35 MMHG
BH CV ECHO MEAS - AO MAX PG: 3.5 MMHG
BH CV ECHO MEAS - AO MEAN PG (FULL): 0.62 MMHG
BH CV ECHO MEAS - AO MEAN PG: 2.3 MMHG
BH CV ECHO MEAS - AO ROOT AREA (BSA CORRECTED): 1.1
BH CV ECHO MEAS - AO ROOT AREA: 5.8 CM^2
BH CV ECHO MEAS - AO ROOT DIAM: 2.7 CM
BH CV ECHO MEAS - AO V2 MAX: 93 CM/SEC
BH CV ECHO MEAS - AO V2 MEAN: 73.1 CM/SEC
BH CV ECHO MEAS - AO V2 VTI: 17.2 CM
BH CV ECHO MEAS - AVA(I,A): 2.7 CM^2
BH CV ECHO MEAS - AVA(I,D): 2.7 CM^2
BH CV ECHO MEAS - AVA(V,A): 2.4 CM^2
BH CV ECHO MEAS - AVA(V,D): 2.4 CM^2
BH CV ECHO MEAS - BSA(HAYCOCK): 2.7 M^2
BH CV ECHO MEAS - BSA: 2.4 M^2
BH CV ECHO MEAS - BZI_BMI: 60.2 KILOGRAMS/M^2
BH CV ECHO MEAS - BZI_METRIC_HEIGHT: 160 CM
BH CV ECHO MEAS - BZI_METRIC_WEIGHT: 154.2 KG
BH CV ECHO MEAS - EDV(CUBED): 82.9 ML
BH CV ECHO MEAS - EDV(MOD-SP2): 37.2 ML
BH CV ECHO MEAS - EDV(MOD-SP4): 39.2 ML
BH CV ECHO MEAS - EDV(TEICH): 85.8 ML
BH CV ECHO MEAS - EF(CUBED): 64.8 %
BH CV ECHO MEAS - EF(MOD-BP): 69 %
BH CV ECHO MEAS - EF(MOD-SP2): 46.3 %
BH CV ECHO MEAS - EF(MOD-SP4): 69.2 %
BH CV ECHO MEAS - EF(TEICH): 56.6 %
BH CV ECHO MEAS - ESV(CUBED): 29.1 ML
BH CV ECHO MEAS - ESV(MOD-SP2): 20 ML
BH CV ECHO MEAS - ESV(MOD-SP4): 12.1 ML
BH CV ECHO MEAS - ESV(TEICH): 37.2 ML
BH CV ECHO MEAS - FS: 29.4 %
BH CV ECHO MEAS - IVS/LVPW: 0.9
BH CV ECHO MEAS - IVSD: 1.1 CM
BH CV ECHO MEAS - LA DIMENSION(2D): 3.6 CM
BH CV ECHO MEAS - LV DIASTOLIC VOL/BSA (35-75): 16.2 ML/M^2
BH CV ECHO MEAS - LV MASS(C)D: 178 GRAMS
BH CV ECHO MEAS - LV MASS(C)DI: 73.5 GRAMS/M^2
BH CV ECHO MEAS - LV MAX PG: 3.1 MMHG
BH CV ECHO MEAS - LV MEAN PG: 1.7 MMHG
BH CV ECHO MEAS - LV SYSTOLIC VOL/BSA (12-30): 5 ML/M^2
BH CV ECHO MEAS - LV V1 MAX: 88.2 CM/SEC
BH CV ECHO MEAS - LV V1 MEAN: 62.6 CM/SEC
BH CV ECHO MEAS - LV V1 VTI: 18.5 CM
BH CV ECHO MEAS - LVIDD: 4.4 CM
BH CV ECHO MEAS - LVIDS: 3.1 CM
BH CV ECHO MEAS - LVOT AREA: 2.5 CM^2
BH CV ECHO MEAS - LVOT DIAM: 1.8 CM
BH CV ECHO MEAS - LVPWD: 1.2 CM
BH CV ECHO MEAS - MV A MAX VEL: 86.7 CM/SEC
BH CV ECHO MEAS - MV DEC SLOPE: 322.3 CM/SEC^2
BH CV ECHO MEAS - MV DEC TIME: 0.29 SEC
BH CV ECHO MEAS - MV E MAX VEL: 95.1 CM/SEC
BH CV ECHO MEAS - MV E/A: 1.1
BH CV ECHO MEAS - MV MAX PG: 3 MMHG
BH CV ECHO MEAS - MV MEAN PG: 1.5 MMHG
BH CV ECHO MEAS - MV V2 MAX: 86.8 CM/SEC
BH CV ECHO MEAS - MV V2 MEAN: 58.3 CM/SEC
BH CV ECHO MEAS - MV V2 VTI: 20.2 CM
BH CV ECHO MEAS - MVA(VTI): 2.3 CM^2
BH CV ECHO MEAS - PA MAX PG (FULL): 2.1 MMHG
BH CV ECHO MEAS - PA MAX PG: 4.1 MMHG
BH CV ECHO MEAS - PA V2 MAX: 101.8 CM/SEC
BH CV ECHO MEAS - RV MAX PG: 2.1 MMHG
BH CV ECHO MEAS - RV MEAN PG: 0.99 MMHG
BH CV ECHO MEAS - RV V1 MAX: 71.7 CM/SEC
BH CV ECHO MEAS - RV V1 MEAN: 46 CM/SEC
BH CV ECHO MEAS - RV V1 VTI: 14.2 CM
BH CV ECHO MEAS - RVDD: 3.4 CM
BH CV ECHO MEAS - SI(AO): 41.2 ML/M^2
BH CV ECHO MEAS - SI(CUBED): 22.2 ML/M^2
BH CV ECHO MEAS - SI(LVOT): 19.1 ML/M^2
BH CV ECHO MEAS - SI(MOD-SP2): 7.1 ML/M^2
BH CV ECHO MEAS - SI(MOD-SP4): 11.2 ML/M^2
BH CV ECHO MEAS - SI(TEICH): 20 ML/M^2
BH CV ECHO MEAS - SV(AO): 99.8 ML
BH CV ECHO MEAS - SV(CUBED): 53.7 ML
BH CV ECHO MEAS - SV(LVOT): 46.2 ML
BH CV ECHO MEAS - SV(MOD-SP2): 17.2 ML
BH CV ECHO MEAS - SV(MOD-SP4): 27.1 ML
BH CV ECHO MEAS - SV(TEICH): 48.6 ML

## 2019-11-18 PROCEDURE — 93306 TTE W/DOPPLER COMPLETE: CPT | Performed by: INTERNAL MEDICINE

## 2019-11-18 PROCEDURE — 93306 TTE W/DOPPLER COMPLETE: CPT

## 2019-11-18 PROCEDURE — 25010000002 SULFUR HEXAFLUORIDE MICROSPH 60.7-25 MG RECONSTITUTED SUSPENSION: Performed by: INTERNAL MEDICINE

## 2019-11-18 RX ADMIN — SULFUR HEXAFLUORIDE 2 ML: KIT at 15:15

## 2019-11-21 ENCOUNTER — OFFICE VISIT (OUTPATIENT)
Dept: FAMILY MEDICINE CLINIC | Facility: CLINIC | Age: 29
End: 2019-11-21

## 2019-11-21 ENCOUNTER — LAB (OUTPATIENT)
Dept: FAMILY MEDICINE CLINIC | Facility: CLINIC | Age: 29
End: 2019-11-21

## 2019-11-21 VITALS
HEART RATE: 84 BPM | OXYGEN SATURATION: 99 % | SYSTOLIC BLOOD PRESSURE: 127 MMHG | DIASTOLIC BLOOD PRESSURE: 92 MMHG | BODY MASS INDEX: 51.91 KG/M2 | WEIGHT: 293 LBS | HEIGHT: 63 IN

## 2019-11-21 DIAGNOSIS — E03.9 HYPOTHYROIDISM, UNSPECIFIED TYPE: ICD-10-CM

## 2019-11-21 DIAGNOSIS — F32.A DEPRESSION, UNSPECIFIED DEPRESSION TYPE: ICD-10-CM

## 2019-11-21 DIAGNOSIS — I10 HYPERTENSION, UNSPECIFIED TYPE: Primary | ICD-10-CM

## 2019-11-21 PROCEDURE — 84443 ASSAY THYROID STIM HORMONE: CPT | Performed by: NURSE PRACTITIONER

## 2019-11-21 PROCEDURE — 99213 OFFICE O/P EST LOW 20 MIN: CPT | Performed by: NURSE PRACTITIONER

## 2019-11-21 PROCEDURE — 36415 COLL VENOUS BLD VENIPUNCTURE: CPT

## 2019-11-21 RX ORDER — METOPROLOL SUCCINATE 50 MG/1
50 TABLET, EXTENDED RELEASE ORAL DAILY
Qty: 30 TABLET | Refills: 2 | Status: SHIPPED | OUTPATIENT
Start: 2019-11-21 | End: 2020-02-09 | Stop reason: SDUPTHER

## 2019-11-21 RX ORDER — PHENIRAMINE/P-EPH/ACETAMINOPHN 20-10-325
1 PACKET (EA) ORAL EVERY 8 HOURS PRN
Refills: 0 | COMMUNITY
Start: 2019-11-12 | End: 2020-04-19

## 2019-11-21 NOTE — PROGRESS NOTES
Subjective   Cindy Thompson is a 29 y.o. female.     Pt is here today to follow up on blood pressure and depression    HTN- BP was initially elevated in office, but once she was sitting it was rechecked and improved.  Pt reports that she has not been on her beta blocker since September because it was too expensive.  She reports that it was going to be $20. On good RX it shows that it is going to be $9 at [facility], which she is willing to pay.  She was switched from labetolol to metoprolol because she could not remember to take the medication twice daily.  She denies any CP or SOA. Denies any dizziness.  Recently had an echo, which was normal.     Depression- Currently on wellbutrin 300XL and she states that her depression is doing well.  She and her family have seen a difference in her and her mood.  She denies any thoughts of hurting herself or anyone else.    Hypothyroid- taking synthroid.  Needs her level rechecked.  Has been taking her medication regularly before eating and before other medications.    Pt has an appointment with the weight management center and with sleep medicine in the near future         The following portions of the patient's history were reviewed and updated as appropriate: allergies, current medications, past family history, past medical history, past social history, past surgical history and problem list.    Review of Systems   Constitutional: Positive for fatigue. Negative for chills and fever.   Eyes: Negative for blurred vision and double vision.   Respiratory: Negative for cough, chest tightness and shortness of breath.    Cardiovascular: Negative for chest pain and palpitations.   Gastrointestinal: Negative for abdominal pain, constipation, diarrhea, nausea and rectal pain.   Genitourinary: Positive for frequency. Negative for dysuria and hematuria.   Neurological: Negative for dizziness and headache.   Psychiatric/Behavioral: Negative for self-injury, sleep disturbance, suicidal ideas  "and stress. The patient is not nervous/anxious.        Objective   /92   Pulse 84   Ht 160 cm (63\")   Wt (!) 158 kg (348 lb)   SpO2 99%   BMI 61.65 kg/m²   Physical Exam   Constitutional: She is oriented to person, place, and time. She appears well-developed and well-nourished.   obesity   HENT:   Head: Normocephalic and atraumatic.   Eyes: Pupils are equal, round, and reactive to light.   Neck: Normal range of motion. Neck supple.   Cardiovascular: Normal rate, regular rhythm and normal heart sounds.   Pulmonary/Chest: Effort normal and breath sounds normal. No respiratory distress. She has no wheezes.   Abdominal: Soft. Bowel sounds are normal.   Musculoskeletal: Normal range of motion.   Neurological: She is alert and oriented to person, place, and time.   Skin: Skin is warm and dry.   Psychiatric: She has a normal mood and affect. Her behavior is normal. Judgment and thought content normal.         Assessment/Plan     Diagnoses and all orders for this visit:    1. Hypertension, unspecified type (Primary)  Comments:  pt never started metoprolol d/t cost  given a coupon  start medication   f/u 2 weeks for BP check  Orders:  -     metoprolol succinate XL (TOPROL XL) 50 MG 24 hr tablet; Take 1 tablet by mouth Daily.  Dispense: 30 tablet; Refill: 2    2. Depression, unspecified depression type  Comments:  stable, cont current med    3. Hypothyroidism, unspecified type  Comments:  check level  cont current med                "

## 2019-11-21 NOTE — PATIENT INSTRUCTIONS
Start metoprolol XL daily  Come in in 2 weeks for a nurse BP check  Continue current meds  Check thyroid level  Continue to work on diet and exercise  Follow up with specialist

## 2019-11-22 LAB — TSH SERPL DL<=0.05 MIU/L-ACNC: 5.23 UIU/ML (ref 0.27–4.2)

## 2019-11-23 DIAGNOSIS — E03.9 HYPOTHYROIDISM, UNSPECIFIED TYPE: Primary | ICD-10-CM

## 2019-11-23 DIAGNOSIS — E03.9 HYPOTHYROIDISM, UNSPECIFIED TYPE: ICD-10-CM

## 2019-11-23 RX ORDER — LEVOTHYROXINE SODIUM 0.1 MG/1
100 TABLET ORAL DAILY
Qty: 30 TABLET | Refills: 1 | Status: SHIPPED | OUTPATIENT
Start: 2019-11-23 | End: 2020-02-09 | Stop reason: SDUPTHER

## 2019-11-24 RX ORDER — LEVOTHYROXINE SODIUM 0.07 MG/1
75 TABLET ORAL DAILY
Qty: 30 TABLET | Refills: 1 | OUTPATIENT
Start: 2019-11-24

## 2019-11-25 ENCOUNTER — OFFICE VISIT (OUTPATIENT)
Dept: SLEEP MEDICINE | Facility: HOSPITAL | Age: 29
End: 2019-11-25

## 2019-11-25 VITALS
HEART RATE: 97 BPM | DIASTOLIC BLOOD PRESSURE: 96 MMHG | SYSTOLIC BLOOD PRESSURE: 145 MMHG | WEIGHT: 293 LBS | HEIGHT: 63 IN | OXYGEN SATURATION: 94 % | BODY MASS INDEX: 51.91 KG/M2

## 2019-11-25 DIAGNOSIS — G47.33 OBSTRUCTIVE SLEEP APNEA, ADULT: Primary | ICD-10-CM

## 2019-11-25 PROCEDURE — G0463 HOSPITAL OUTPT CLINIC VISIT: HCPCS

## 2019-11-25 NOTE — PROGRESS NOTES
SLEEP MEDICINE CONSULT      Patient Identification:     Name: Cindy Thompson   Age: 29 y.o.   Gender: female   : 1990   MRN: [id number]     Date of consult: 2019    PCP/Referring Physician(s):     PCP: Dea Delaney APRN  Referring Provider: Padmini Escobar MD      Chief Complaint:   Obstructive sleep apnea.  Remains symptomatic.    History of Present Illness:   This is a very pleasant 29 years old  lady who was initially diagnosed with obstructive sleep apnea after undergoing a nocturnal polysomnogram in year .  A CPAP titration followed.  She was provided with a CPAP mask.  She was unable to use the mask for longer periods of time for severe xerostomia.  Remains symptomatic.  She gave up on therapy.    She has felt tired and somnolent during the daytime.  She has dozed off during all sedentary activities.  She continues to sleep for more than 10 to 12 hours/day.  She cannot participate in any social activities because of her hypersomnolence.  She has discussed her symptoms with her primary care.  She is advised to resume CPAP therapy.  She has been referred to sleep center for further management.    Her bedtime nowadays is 6 PM.  She dozes off within 15 minutes.  Her rise time may be from 9 AM to noon.  Sometimes she has woken up later than noon time.  She has woken up every hour at night for a bathroom break.  She has dozed off easily.  She has snored quite loudly at night.  Her snores has become much more louder and disruptive.  She is not aware of any apneic events.  She has often woken up with a dry mouth at night as well as in the morning.  She denies any symptoms of heartburn.  She is a mouth breather.  She denies any symptoms of palpitation or choking at night or in the morning.  Sometimes she has woken up fighting for air.    He has woken up in the morning tired and somnolent.  She remains tired throughout the day.  He has dozed off during sedentary activities.  Her [place]  sleepiness score is 18/24.  He likes to have 4 cans of Coke throughout the day.  She does not like coffee.  He likes to take a long nap between 11 AM to 3 PM.    Allergies, Medications, and Past History:   Allergies:    No Known Allergies  Current Medications:    Prior to Admission medications    Medication Sig Start Date End Date Taking? Authorizing Provider   ARTHRITIS PAIN RELIEVER 650 MG 8 hr tablet Take 1 tablet by mouth Every 8 (Eight) Hours As Needed for Pain. 11/12/19  Yes Sebas Anglin MD   buPROPion XL (WELLBUTRIN XL) 300 MG 24 hr tablet Take 1 tablet by mouth Daily. 7/25/19  Yes Dea Delaney APRN   dicyclomine (BENTYL) 20 MG tablet Take 1 tablet by mouth Every 6 (Six) Hours As Needed (ABD CRAMPING). 8/17/19  Yes Myriam Joseph NP   ibuprofen (ADVIL,MOTRIN) 600 MG tablet Take 600 mg by mouth Every 6 (Six) Hours As Needed for Mild Pain .   Yes Sebas Anglin MD   levothyroxine (SYNTHROID) 100 MCG tablet Take 1 tablet by mouth Daily. 11/23/19  Yes Dea Delaney APRN   megestrol (MEGACE) 40 MG tablet Take 2 tablets by mouth 2 (Two) Times a Day. 6/27/19  Yes Sebas Anglin MD   metoprolol succinate XL (TOPROL XL) 50 MG 24 hr tablet Take 1 tablet by mouth Daily. 11/21/19  Yes Dea Delaney APRN   ondansetron ODT (ZOFRAN-ODT) 4 MG disintegrating tablet Take 1 tablet by mouth Every 8 (Eight) Hours As Needed for Nausea or Vomiting. 8/17/19  Yes Myriam Joseph NP     Past Medical History:    Past Medical History:   Diagnosis Date   • Depression    • Hypertension    • Learning disabilities    • Low back pain    • Obesity    • BEBA (obstructive sleep apnea)    • Snoring    • Uterine cancer (CMS/HCC)     [facility]- Dr. Eva Belle      Past Surgical History:    Past Surgical History:   Procedure Laterality Date   • DILATATION AND CURETTAGE     • TONSILLECTOMY        Social History:    Social History     Tobacco Use   • Smoking status: Former Smoker     Last attempt to quit:  "2012     Years since quittin.9   • Smokeless tobacco: Never Used   Substance Use Topics   • Alcohol use: No     Frequency: Never     Comment: used to drink weekly   • Drug use: No     Family Medical History:  Family History   Problem Relation Age of Onset   • Cancer Father    • Hyperlipidemia Father    • Stroke Father    • Depression Sister    • Learning disabilities Sister    • Diabetes Maternal Grandmother          Review of Systems:   Constitutional:  Denies fever or chills.  She has gained 150 pounds in last 5 years.  No night sweats.  And weight gain  Eyes:  Denies change in visual acuity   HENT: She has nasal congestion, sore throat or post nasal drainage with change of seasons.  She becomes more symptomatic during  than spring.  She has never been tested for allergies.  Respiratory:  Denies cough, shortness of breath or dyspnea on exertion    Cardiovascular:  Denies chest pain or edema   GI:  Denies abdominal pain, nausea, vomiting, bloody stools or diarrhea   :  Denies dysuria or nocturia   Musculoskeletal:  Denies back pain or joint pain   Integument:  Denies rash   Neurologic:  Denies headache, focal weakness or sensory changes. No history of stroke or seizures.   Endocrine:  Denies polyuria or polydipsia   Lymphatic:  Denies swollen glands   Psychiatric:  Denies depression, anxiety or claustrophobia      Physical Exam:     Vitals:    19 1300   BP: 145/96   Pulse: 97   SpO2: 94%   Weight: (!) 158 kg (349 lb 3.2 oz)   Height: 160 cm (63\")     HEENT: Head is normocephalic, atraumatic, normal distribution of hair. Pupils reactive to light. Ocular movements intact. No nasal congestion on sniff test. No deviated nasal septum. No micrognathia.  Throat: Mallapatti stage 4, tongue midline, mucosa moist.  Neck: no JVD, thyromegaly, mass, +midline trachea, Neck circumference 19 inches  Lungs: clear, no wheeze, rhonchi, crackles  Cardiovascular: S1, S2, RRR no murmurs, rubs or gallops  Abd: +BS's soft " NT/ND, no CVA tenderness.  Obese and pendulous.   Ext: no edema, cyanosis, clubbing, pulses intact  Neuro: Awake alert, oriented to time place and person. Grossly intact to motor, sensory cerebral, and cerebellar (without focal deficit)  Psych: No overt damion, depression, psychosis or inappropriate behavior  Skin: No rash, cellulitis, or ulcerations.  No facial rash or erosions      Assessment/Plan:   Obstructive sleep apnea:  This is a very pleasant 29 years old lady who has been diagnosed with obstructive sleep apnea in 2014.  She has remained symptomatic after discontinuing the use of auto CPAP mode.  She has felt tired and somnolent.  She has dozed off during sedentary activities.  She sleeps most of the day.  Recommendation.  A split-night polysomnogram is recommended.  Allergic rhinitis:  She becomes symptomatic during spring and fall more so during the fall.  She sometimes takes over-the-counter medication for symptomatic relief.  Recommendation.  Aggressive therapy is recommended.  She may benefit from allergy testing.  Obesity:  Based on BMI of 61.9.  Recommendation.  Weight loss is recommended.  Driving instructions. Patient is advised to avoid driving if tired or somnolent. To avoid all alcoholic beverages or medications causing somnolence.         Diagnosis Plan   1. Obstructive sleep apnea, adult       No orders of the defined types were placed in this encounter.    No orders of the defined types were placed in this encounter.    This document has been electronically signed by:  Padmini Escobar MD  11/25/2019  1:56 PM

## 2019-11-29 ENCOUNTER — HOSPITAL ENCOUNTER (OUTPATIENT)
Dept: SLEEP MEDICINE | Facility: HOSPITAL | Age: 29
Discharge: HOME | End: 2019-11-29
Admitting: INTERNAL MEDICINE

## 2019-11-29 DIAGNOSIS — G47.33 OBSTRUCTIVE SLEEP APNEA, ADULT: ICD-10-CM

## 2019-11-29 PROCEDURE — 95811 POLYSOM 6/>YRS CPAP 4/> PARM: CPT

## 2019-12-02 DIAGNOSIS — G47.33 OBSTRUCTIVE SLEEP APNEA, ADULT: Primary | ICD-10-CM

## 2019-12-11 ENCOUNTER — CLINICAL SUPPORT (OUTPATIENT)
Dept: FAMILY MEDICINE CLINIC | Facility: CLINIC | Age: 29
End: 2019-12-11

## 2019-12-11 VITALS
OXYGEN SATURATION: 97 % | BODY MASS INDEX: 61.86 KG/M2 | SYSTOLIC BLOOD PRESSURE: 118 MMHG | HEART RATE: 92 BPM | HEIGHT: 63 IN | DIASTOLIC BLOOD PRESSURE: 88 MMHG

## 2019-12-11 DIAGNOSIS — I10 HYPERTENSION, UNSPECIFIED TYPE: Primary | ICD-10-CM

## 2019-12-11 NOTE — PROGRESS NOTES
Pt is coming in today for 2 week bp check after starting metoprolol   No cp, soa, palp, dizziness.   Pharmacy: [facility]

## 2019-12-11 NOTE — PROGRESS NOTES
I spoke with patient and advised her that her BP looks good at this time. She is to continue her current dose of metoprolol XL 50mg.  Pt understood.  She is to continue to work on diet and exercise.  She is scheduling appt with weight loss center.

## 2019-12-16 ENCOUNTER — OFFICE VISIT (OUTPATIENT)
Dept: BARIATRICS/WEIGHT MGMT | Facility: CLINIC | Age: 29
End: 2019-12-16

## 2019-12-16 DIAGNOSIS — E66.01 SUPER-SUPER OBESE (HCC): Primary | ICD-10-CM

## 2019-12-16 NOTE — PROGRESS NOTES
"Bariatric Nutrition Counseling Interview    Patient Name:  Cindy Thompson  YOB: 1990  Age:  29 y.o.  Sex:  female  MRN: [id number]  Date:  19    Procedure Considering:  Sleeve    Last Documented Height:    Ht Readings from Last 1 Encounters:   19 160 cm (63\")     Last Documented Weight:   Wt Readings from Last 1 Encounters:   19 (!) 158 kg (349 lb 3.2 oz)      There is no height or weight on file to calculate BMI.  [unfilled]    Highest Weight:  326 lb   Goal Weight: 110- 226 lb     History:  Past Medical History:   Diagnosis Date   • Depression    • Hypertension    • Learning disabilities    • Low back pain    • Obesity    • BEBA (obstructive sleep apnea)    • Snoring    • Uterine cancer (CMS/HCC)     [facility]- Dr. Eva Belle     Past Surgical History:   Procedure Laterality Date   • DILATATION AND CURETTAGE     • TONSILLECTOMY       Family History   Problem Relation Age of Onset   • Cancer Father    • Hyperlipidemia Father    • Stroke Father    • Depression Sister    • Learning disabilities Sister    • Diabetes Maternal Grandmother      Social History     Socioeconomic History   • Marital status:      Spouse name: Not on file   • Number of children: Not on file   • Years of education: Not on file   • Highest education level: Not on file   Tobacco Use   • Smoking status: Former Smoker     Last attempt to quit: 2012     Years since quittin.9   • Smokeless tobacco: Never Used   Substance and Sexual Activity   • Alcohol use: No     Frequency: Never     Comment: used to drink weekly   • Drug use: No   • Sexual activity: Yes     Partners: Male     Birth control/protection: None     Additional Health Issues to Consider:  Pre DM, Uterine cancer    Weight History:  Always been overweight    Previous Weight Loss Efforts:  Calorie counting, Healthy eating?  Most Successful Weight Loss Effort:  Went down 180 lb- d/t not eating.     0-10 scale of importance of weight loss (0 not " important to 10 very important): 10     0-10 scale of ability to change (0 unable to change to 10 making changes):   8 eat less     Breakfast: skip  Lunch: Hot Pocket, pizza  Dinner: Spaghetti/BBQ Chicken w/ mashed potatoes/ Kai  Snack PM: Little Mayra/ Snack cake    Eating Habits: Eat large portions, Late night eating, Snacker, Eat out alot  Eat three meals on most days?  No  Worst eating habit?  Late night eating, Eating out    How often do you eat fast food/eat out? 2-3 times weekly    Do you exercise regularly? (at least 3 times each week)  No    Occupation:  Not working    Personal Goal After Procedure:  [employer] [employer]    Personal Support:  , parents and siblings     Plan:  Goals:   Eat & Drink Separately by next appointment at dinner meal(s) and Eating out only 1x per week, and drinking only 1 soda per day.     Self monitoring deficit RT food and nutrition related knowledge deficit concerning self monitoring AEB 5 sodas (regular per day) and no exercise.     Electronically signed by:  Shira Adamson RD  12/16/19 9:18 AM

## 2019-12-16 NOTE — PATIENT INSTRUCTIONS
Eat & Drink Separately by next appointment at dinner meal(s) and Eating out only 1x per week, and drinking only 1 soda per day.

## 2020-01-15 DIAGNOSIS — E66.01 OBESITY, CLASS III, BMI 40-49.9 (MORBID OBESITY) (HCC): Primary | ICD-10-CM

## 2020-01-28 ENCOUNTER — OFFICE VISIT (OUTPATIENT)
Dept: BARIATRICS/WEIGHT MGMT | Facility: CLINIC | Age: 30
End: 2020-01-28

## 2020-01-28 ENCOUNTER — OFFICE VISIT (OUTPATIENT)
Dept: PSYCHIATRY | Facility: CLINIC | Age: 30
End: 2020-01-28

## 2020-01-28 ENCOUNTER — HOSPITAL ENCOUNTER (OUTPATIENT)
Dept: GENERAL RADIOLOGY | Facility: HOSPITAL | Age: 30
Discharge: HOME | End: 2020-01-28

## 2020-01-28 ENCOUNTER — HOSPITAL ENCOUNTER (OUTPATIENT)
Dept: CARDIOLOGY | Facility: HOSPITAL | Age: 30
Discharge: HOME | End: 2020-01-28
Admitting: SURGERY

## 2020-01-28 ENCOUNTER — LAB (OUTPATIENT)
Dept: LAB | Facility: HOSPITAL | Age: 30
End: 2020-01-28

## 2020-01-28 VITALS
HEART RATE: 98 BPM | WEIGHT: 293 LBS | OXYGEN SATURATION: 93 % | RESPIRATION RATE: 18 BRPM | BODY MASS INDEX: 51.91 KG/M2 | TEMPERATURE: 98.4 F | SYSTOLIC BLOOD PRESSURE: 140 MMHG | HEIGHT: 63 IN | DIASTOLIC BLOOD PRESSURE: 88 MMHG

## 2020-01-28 DIAGNOSIS — G47.30 OBESITY WITH SLEEP APNEA: ICD-10-CM

## 2020-01-28 DIAGNOSIS — F18.10 ABUSE OF SMOKED SUBSTANCE (HCC): ICD-10-CM

## 2020-01-28 DIAGNOSIS — F19.20 DRUG ABUSE AND DEPENDENCE (HCC): ICD-10-CM

## 2020-01-28 DIAGNOSIS — Z01.818 PRE-OPERATIVE EXAM: ICD-10-CM

## 2020-01-28 DIAGNOSIS — E66.01 SUPER-SUPER OBESE (HCC): ICD-10-CM

## 2020-01-28 DIAGNOSIS — E66.01 MORBID OBESITY (HCC): Primary | ICD-10-CM

## 2020-01-28 DIAGNOSIS — E66.01 SUPER-SUPER OBESE (HCC): Primary | ICD-10-CM

## 2020-01-28 DIAGNOSIS — E66.9 OBESITY WITH SLEEP APNEA: ICD-10-CM

## 2020-01-28 LAB
ALBUMIN SERPL-MCNC: 3.9 G/DL (ref 3.5–5.2)
ALBUMIN/GLOB SERPL: 1.3 G/DL
ALP SERPL-CCNC: 85 U/L (ref 39–117)
ALT SERPL W P-5'-P-CCNC: 20 U/L (ref 1–33)
AMPHET+METHAMPHET UR QL: NEGATIVE
ANION GAP SERPL CALCULATED.3IONS-SCNC: 13.4 MMOL/L (ref 5–15)
AST SERPL-CCNC: 20 U/L (ref 1–32)
BARBITURATES UR QL SCN: NEGATIVE
BASOPHILS # BLD AUTO: 0.03 10*3/MM3 (ref 0–0.2)
BASOPHILS NFR BLD AUTO: 0.3 % (ref 0–1.5)
BENZODIAZ UR QL SCN: NEGATIVE
BILIRUB SERPL-MCNC: 0.2 MG/DL (ref 0.2–1.2)
BUN BLD-MCNC: 9 MG/DL (ref 6–20)
BUN/CREAT SERPL: 13.6 (ref 7–25)
CALCIUM SPEC-SCNC: 9.1 MG/DL (ref 8.6–10.5)
CANNABINOIDS SERPL QL: NEGATIVE
CHLORIDE SERPL-SCNC: 95 MMOL/L (ref 98–107)
CHOLEST SERPL-MCNC: 190 MG/DL (ref 0–200)
CO2 SERPL-SCNC: 29.6 MMOL/L (ref 22–29)
COCAINE UR QL: NEGATIVE
CREAT BLD-MCNC: 0.66 MG/DL (ref 0.57–1)
DEPRECATED RDW RBC AUTO: 45.6 FL (ref 37–54)
EOSINOPHIL # BLD AUTO: 0.17 10*3/MM3 (ref 0–0.4)
EOSINOPHIL NFR BLD AUTO: 1.9 % (ref 0.3–6.2)
ERYTHROCYTE [DISTWIDTH] IN BLOOD BY AUTOMATED COUNT: 16.3 % (ref 12.3–15.4)
GFR SERPL CREATININE-BSD FRML MDRD: 107 ML/MIN/1.73
GLOBULIN UR ELPH-MCNC: 3 GM/DL
GLUCOSE BLD-MCNC: 99 MG/DL (ref 65–99)
HCT VFR BLD AUTO: 39.5 % (ref 34–46.6)
HDLC SERPL-MCNC: 31 MG/DL (ref 40–60)
HGB BLD-MCNC: 11.7 G/DL (ref 12–15.9)
IMM GRANULOCYTES # BLD AUTO: 0.02 10*3/MM3 (ref 0–0.05)
IMM GRANULOCYTES NFR BLD AUTO: 0.2 % (ref 0–0.5)
LDLC SERPL CALC-MCNC: 131 MG/DL (ref 0–100)
LDLC/HDLC SERPL: 4.23 {RATIO}
LYMPHOCYTES # BLD AUTO: 2.04 10*3/MM3 (ref 0.7–3.1)
LYMPHOCYTES NFR BLD AUTO: 22.7 % (ref 19.6–45.3)
MCH RBC QN AUTO: 22.9 PG (ref 26.6–33)
MCHC RBC AUTO-ENTMCNC: 29.6 G/DL (ref 31.5–35.7)
MCV RBC AUTO: 77.3 FL (ref 79–97)
METHADONE UR QL SCN: NEGATIVE
MONOCYTES # BLD AUTO: 0.43 10*3/MM3 (ref 0.1–0.9)
MONOCYTES NFR BLD AUTO: 4.8 % (ref 5–12)
NEUTROPHILS # BLD AUTO: 6.28 10*3/MM3 (ref 1.7–7)
NEUTROPHILS NFR BLD AUTO: 70.1 % (ref 42.7–76)
NRBC BLD AUTO-RTO: 0 /100 WBC (ref 0–0.2)
OPIATES UR QL: NEGATIVE
OXYCODONE UR QL SCN: NEGATIVE
PLATELET # BLD AUTO: 342 10*3/MM3 (ref 140–450)
PMV BLD AUTO: 8.5 FL (ref 6–12)
POTASSIUM BLD-SCNC: 4.4 MMOL/L (ref 3.5–5.2)
PROT SERPL-MCNC: 6.9 G/DL (ref 6–8.5)
RBC # BLD AUTO: 5.11 10*6/MM3 (ref 3.77–5.28)
SODIUM BLD-SCNC: 138 MMOL/L (ref 136–145)
TRIGL SERPL-MCNC: 140 MG/DL (ref 0–150)
TSH SERPL DL<=0.05 MIU/L-ACNC: 7.46 UIU/ML (ref 0.27–4.2)
VLDLC SERPL-MCNC: 28 MG/DL (ref 5–40)
WBC NRBC COR # BLD: 8.97 10*3/MM3 (ref 3.4–10.8)

## 2020-01-28 PROCEDURE — 80307 DRUG TEST PRSMV CHEM ANLYZR: CPT

## 2020-01-28 PROCEDURE — 82652 VIT D 1 25-DIHYDROXY: CPT

## 2020-01-28 PROCEDURE — 80053 COMPREHEN METABOLIC PANEL: CPT

## 2020-01-28 PROCEDURE — G0480 DRUG TEST DEF 1-7 CLASSES: HCPCS

## 2020-01-28 PROCEDURE — 71046 X-RAY EXAM CHEST 2 VIEWS: CPT

## 2020-01-28 PROCEDURE — 36415 COLL VENOUS BLD VENIPUNCTURE: CPT

## 2020-01-28 PROCEDURE — 84425 ASSAY OF VITAMIN B-1: CPT

## 2020-01-28 PROCEDURE — 80061 LIPID PANEL: CPT

## 2020-01-28 PROCEDURE — 99205 OFFICE O/P NEW HI 60 MIN: CPT | Performed by: SURGERY

## 2020-01-28 PROCEDURE — 90791 PSYCH DIAGNOSTIC EVALUATION: CPT | Performed by: PSYCHIATRY & NEUROLOGY

## 2020-01-28 PROCEDURE — 85025 COMPLETE CBC W/AUTO DIFF WBC: CPT

## 2020-01-28 PROCEDURE — 84443 ASSAY THYROID STIM HORMONE: CPT

## 2020-01-28 PROCEDURE — 93005 ELECTROCARDIOGRAM TRACING: CPT | Performed by: SURGERY

## 2020-01-28 RX ORDER — SODIUM CHLORIDE, SODIUM LACTATE, POTASSIUM CHLORIDE, CALCIUM CHLORIDE 600; 310; 30; 20 MG/100ML; MG/100ML; MG/100ML; MG/100ML
30 INJECTION, SOLUTION INTRAVENOUS CONTINUOUS
Status: CANCELLED | OUTPATIENT
Start: 2020-01-28
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
## 2020-01-28 NOTE — PATIENT INSTRUCTIONS

## 2020-01-28 NOTE — PROGRESS NOTES
[facility]  [address] [place] [address]  [address] [place] [address]  Dept: [phone]  1/28/2020      Cindy Thompson.  [id number]  [id number]  1990  female      Chief Complaint of weight gain; unable to maintain weight loss    History of Present Illness:   Cindy is a 29 y.o. female who presents today for evaluation, education and consultation regarding weight loss surgery. The patient is interested in the sleeve gastrectomy.      Diet History:See dietician/RN/MA documentation for complete history of weight and diet.     Bariatric Surgery Evaluation: The patient is being seen for an initial visit for bariatric surgery evaluation.       Patient Active Problem List   Diagnosis   • Uterine cancer (CMS/HCC)   • Hypertension   • Depression   • Learning disability   • Abdominal pain   • Abnormal urinalysis   • Arthropathy   • B12 deficiency   • Former smoker   • Headache, migraine   • Hypothyroidism   • Microscopic hematuria   • BMI 60.0-69.9, adult (CMS/HCC)   • Obesity   • Oligomenorrhea   • Other general symptoms and signs   • Stage 1 chronic kidney disease   • Tooth disorder   • Sore throat   • Morbid obesity (CMS/HCC)   • Pre-operative exam       Past Medical History:   Diagnosis Date   • Depression    • Hypertension    • Learning disabilities    • Low back pain    • Obesity    • BEBA (obstructive sleep apnea)    • Snoring    • Uterine cancer (CMS/HCC)     [facility]- Dr. Eva Belle       Past Surgical History:   Procedure Laterality Date   • DILATATION AND CURETTAGE         No Known Allergies      Current Outpatient Medications:   •  ARTHRITIS PAIN RELIEVER 650 MG 8 hr tablet, Take 1 tablet by mouth Every 8 (Eight) Hours As Needed for Pain., Disp: , Rfl: 0  •  buPROPion XL (WELLBUTRIN XL) 300 MG 24 hr tablet, Take 1 tablet by mouth Daily., Disp: 30 tablet, Rfl: 2  •  dicyclomine (BENTYL) 20 MG tablet, Take 1 tablet by  mouth Every 6 (Six) Hours As Needed (ABD CRAMPING)., Disp: 20 tablet, Rfl: 0  •  ibuprofen (ADVIL,MOTRIN) 600 MG tablet, Take 600 mg by mouth Every 6 (Six) Hours As Needed for Mild Pain ., Disp: , Rfl:   •  levothyroxine (SYNTHROID) 100 MCG tablet, Take 1 tablet by mouth Daily., Disp: 30 tablet, Rfl: 1  •  megestrol (MEGACE) 40 MG tablet, Take 2 tablets by mouth 2 (Two) Times a Day., Disp: , Rfl: 4  •  metoprolol succinate XL (TOPROL XL) 50 MG 24 hr tablet, Take 1 tablet by mouth Daily., Disp: 30 tablet, Rfl: 2  •  ondansetron ODT (ZOFRAN-ODT) 4 MG disintegrating tablet, Take 1 tablet by mouth Every 8 (Eight) Hours As Needed for Nausea or Vomiting., Disp: 10 tablet, Rfl: 0    Social History     Socioeconomic History   • Marital status:      Spouse name: Not on file   • Number of children: Not on file   • Years of education: Not on file   • Highest education level: Not on file   Tobacco Use   • Smoking status: Former Smoker     Last attempt to quit:      Years since quittin.0   • Smokeless tobacco: Never Used   Substance and Sexual Activity   • Alcohol use: No     Frequency: Never     Comment: used to drink weekly   • Drug use: No   • Sexual activity: Yes     Partners: Male     Birth control/protection: None       Family History   Problem Relation Age of Onset   • Cancer Father    • Hyperlipidemia Father    • Stroke Father    • Depression Sister    • Learning disabilities Sister    • Diabetes Maternal Grandmother    • Cancer Maternal Grandmother    • Heart attack Maternal Grandfather    • Cancer Paternal Grandmother    • Emphysema Paternal Grandfather          Review of Systems:  Review of Systems   Constitutional: Negative.    HENT: Negative.    Eyes: Negative.    Respiratory: Negative.    Cardiovascular: Negative.    Gastrointestinal: Negative.    Endocrine: Negative.    Genitourinary: Negative.    Musculoskeletal: Negative.    Skin: Negative.    Allergic/Immunologic: Negative.    Neurological:  Negative.    Hematological: Negative.    Psychiatric/Behavioral: Negative.        Physical Exam:  Vital Signs:  Weight: (!) 170 kg (374 lb)   Body mass index is 66.25 kg/m².  Temp: 98.4 °F (36.9 °C)   Heart Rate: 98   BP: 140/88     Physical Exam   Constitutional: She is oriented to person, place, and time. She appears well-developed and well-nourished.   HENT:   Head: Normocephalic and atraumatic.   Eyes: Pupils are equal, round, and reactive to light. Conjunctivae and EOM are normal.   Neck: Normal range of motion. Neck supple.   Cardiovascular: Normal rate, regular rhythm, normal heart sounds and intact distal pulses.   Pulmonary/Chest: Effort normal and breath sounds normal.   Abdominal: Soft. Bowel sounds are normal. She exhibits no distension and no mass. There is no tenderness. There is no rebound and no guarding. No hernia.   Musculoskeletal: Normal range of motion. She exhibits no edema.   Neurological: She is alert and oriented to person, place, and time.   Skin: Skin is warm and dry.   Psychiatric: She has a normal mood and affect.   Vitals reviewed.         Assessment:         Cindy Thompson is a 29 y.o. year old female with medically complicated severe obesity. Weight: (!) 170 kg (374 lb), Body mass index is 66.25 kg/m². and weight related problems.    I explained in detail the procedures that we are performing.  All of those procedures can be performed laparoscopically but there is a chance to convert to open if any technical challenges or complications do occur.  Bariatric surgery is not cosmetic surgery but rather a tool to help a patient make a life-long commitment lifestyle changes including diet, exercise, behavior changes, and taking supplemental vitamins and minerals.    Due to the patient's BMI and co-morbidities they are at a high risk for surgery and will obtain the following:  The patient has been advised that a letter of medical support and a history and physical must be obtained from her  primary care physician. A psychological evaluation will be arranged for this patient. CBC, CMP, FLP, TSH and HgbA1C will be drawn. Cindy Thompson will obtain a pre-operative CXR and EKG. Cindy Thompson will obtain clearance from a cardiologist prior to surgery.      Cindy Thompson will be set up for a pre-operative diagnostic esophagogastroduodenoscopy with biopsy for evaluation. The risks and benefits of the procedure were discussed with the patient in detail and all questions were answered.  Possibility of perforation, bleeding, aspiration, anoxic brain injury, respiratory and/or cardiac arrest and death were discussed.   She received handouts regarding, all questions were answered and informed consent was obtained.     The risks, benefits, alternatives, and potential complications of all of the procedures were explained in detail including, but not limited to death, anesthesia and medication adverse effect/DVT, pulmonary embolism, trocar site/incisional hernia, wound infection, abdominal infection, bleeding, failure to lose weight or gain weight and change in body image, metabolic complications with calcium, thiamine, vitamin B12, folate, iron, and anemia.    The patient was advised to start a high protein, low fat and low carbohydrate diet. The patient was given individualized information by our dietician along with general group information and handouts.     The patient had the Basal Metabolic Rate test performed in our office today then I reviewed the results with them including changes to help increase metabolism and a recommended daily caloric intake range- see scanned results.      The patient was given information regarding the YOLI educational video. YOLI is an internet based educational video which explains the surgical procedure and answers basic questions regarding the procedure. The patient was provided with instructions and a password to watch the video.    The patient was encouraged to start routine exercise  including but not limited to 150 minutes per week. The patient received a resistance band along with a handout of exercises.     The consultation plan was reviewed with the patient.    The patient understands the surgical procedures and the different surgical options that are available.  She understands the lifestyle changes that would be required after surgery and has agreed to participate in a pre-operative and postoperative weight management program.  She also expressed understanding of possible risks, had several questions answered and desires to proceed.    I think she is a good candidate for this surgery, and is interested in a sleeve gastrectomy.    Encounter Diagnoses   Name Primary?   • Super-super obese (CMS/HCC) Yes   • Obesity with sleep apnea     • Drug abuse and dependence (CMS/HCC)     • Abuse of smoked substance (CMS/HCC)         Plan:    Patient will have evaluations and follow up with bariatric dieticians and a psychologist before undergoing a multidisciplinary review of her candidacy.  We also discussed the weight loss requirement and rationale, and other program requirements.      Elle Bill MD  1/28/2020

## 2020-01-29 LAB
COTININE UR-MCNC: NEGATIVE NG/ML
Lab: NORMAL

## 2020-01-30 LAB — 1,25(OH)2D3 SERPL-MCNC: 63.9 PG/ML (ref 19.9–79.3)

## 2020-01-31 LAB — VIT B1 BLD-SCNC: 197.9 NMOL/L (ref 66.5–200)

## 2020-02-09 DIAGNOSIS — E03.9 HYPOTHYROIDISM, UNSPECIFIED TYPE: ICD-10-CM

## 2020-02-09 DIAGNOSIS — I10 HYPERTENSION, UNSPECIFIED TYPE: ICD-10-CM

## 2020-02-09 DIAGNOSIS — F32.A DEPRESSION, UNSPECIFIED DEPRESSION TYPE: ICD-10-CM

## 2020-02-09 RX ORDER — BUPROPION HYDROCHLORIDE 300 MG/1
300 TABLET ORAL DAILY
Qty: 30 TABLET | Refills: 2 | Status: SHIPPED | OUTPATIENT
Start: 2020-02-09 | End: 2020-04-19

## 2020-02-09 RX ORDER — METOPROLOL SUCCINATE 50 MG/1
50 TABLET, EXTENDED RELEASE ORAL DAILY
Qty: 30 TABLET | Refills: 2 | Status: SHIPPED | OUTPATIENT
Start: 2020-02-09 | End: 2020-09-10

## 2020-02-09 RX ORDER — LEVOTHYROXINE SODIUM 0.1 MG/1
100 TABLET ORAL DAILY
Qty: 30 TABLET | Refills: 1 | Status: SHIPPED | OUTPATIENT
Start: 2020-02-09 | End: 2020-04-19

## 2020-02-18 ENCOUNTER — OFFICE VISIT (OUTPATIENT)
Dept: CARDIOLOGY | Facility: CLINIC | Age: 30
End: 2020-02-18

## 2020-02-18 VITALS
SYSTOLIC BLOOD PRESSURE: 119 MMHG | HEART RATE: 93 BPM | BODY MASS INDEX: 50.02 KG/M2 | WEIGHT: 293 LBS | HEIGHT: 64 IN | DIASTOLIC BLOOD PRESSURE: 87 MMHG | OXYGEN SATURATION: 93 %

## 2020-02-18 DIAGNOSIS — Z82.49 FAMILY HISTORY OF PREMATURE CAD: ICD-10-CM

## 2020-02-18 DIAGNOSIS — R73.03 PREDIABETES: ICD-10-CM

## 2020-02-18 DIAGNOSIS — I10 ESSENTIAL HYPERTENSION: ICD-10-CM

## 2020-02-18 DIAGNOSIS — E78.5 DYSLIPIDEMIA: ICD-10-CM

## 2020-02-18 DIAGNOSIS — Z01.810 PREOPERATIVE CARDIOVASCULAR EXAMINATION: ICD-10-CM

## 2020-02-18 DIAGNOSIS — R07.9 CHEST PAIN, UNSPECIFIED TYPE: Primary | ICD-10-CM

## 2020-02-18 DIAGNOSIS — E66.01 SUPER-SUPER OBESE (HCC): ICD-10-CM

## 2020-02-18 PROCEDURE — 99204 OFFICE O/P NEW MOD 45 MIN: CPT | Performed by: INTERNAL MEDICINE

## 2020-02-18 RX ORDER — ASPIRIN 325 MG
325 TABLET ORAL EVERY 6 HOURS PRN
COMMUNITY
End: 2020-04-19

## 2020-02-18 NOTE — PROGRESS NOTES
Cardiology Consult Note    Patient Identification:  Name: Cindy Thompson  Age: 29 y.o.  Sex: female  :  1990  MRN: [id number]             Requesting Physician : Dr. Elle Bill    Reason for Consultation / Chief Complaint : patient co-management the operative cardiovascular evaluation for bariatric surgery    History of Present Illness:        This is a 29-year-old with PMH of    -Morbid obesity  -Hypertension  -Dyslipidemia  -?  Prediabetes  -Hypothyroidism  -Depression, uterine CA,   - D&C  - smoker  -Positive family history of premature CAD in maternal grandfather in 40s    Here for preoperative cardiovascular evaluation for bariatric surgery.  Patient is not very active and is morbidly obese.  Has history of hypertension cigarette smoker prediabetes positive family history of premature CAD in grandfather and has recurrent chest pain intermittently all her life since age 12.  Was told she has enlarged heart when she went to the emergency room.  Patient attempted to have an echocardiogram which was technically difficult.  Patient could not get up from the chair to climb up onto the examining table and was having difficulty stepping up to the examining table.  Patient's arterial blood pressure is 119/87, heart rate 93, O2 sat of 93%.  Patient had labs done 2020 where cholesterol was 190  HDL low at 31 CMP was normal.  Last hemoglobin A1c was 6.   EKG reviewed by me from 2020 shows sinus rhythm with rate of 97 bpm      Assessment:      -Recurrent chest pain of unclear etiology  -Cigarette smoker  -Hypertension  -Dyslipidemia with low HDL at 31  -Positive family history of premature CAD  -Preoperative cardiovascular evaluation for bariatric surgery/morbid obesity    Recommendations / Plan:        Counseled on smoking cessation  It is technically challenging to get decent images and patient due to her body size.  We will try to do stress Cardiolite.  Echo was technically challenging.  We will  follow-up after testing and consider further evaluation and treatment.  Reviewed EKG and lab results with patient.           Diagnosis Plan   1. Chest pain, unspecified type  Stress Test With Myocardial Perfusion One Day   2. Preoperative cardiovascular examination  Stress Test With Myocardial Perfusion One Day   3. Super-super obese (CMS/HCC)  Stress Test With Myocardial Perfusion One Day   4. Family history of premature CAD  Stress Test With Myocardial Perfusion One Day   5. Essential hypertension  Stress Test With Myocardial Perfusion One Day   6. Dyslipidemia  Stress Test With Myocardial Perfusion One Day          Past Medical History:  Past Medical History:   Diagnosis Date   • Depression    • Hypertension    • Learning disabilities    • Low back pain    • Obesity    • BEBA (obstructive sleep apnea)    • Snoring    • Uterine cancer (CMS/HCC)     [facility]- Dr. Eva Belle     Past Surgical History:  Past Surgical History:   Procedure Laterality Date   • DILATATION AND CURETTAGE        Allergies:  No Known Allergies  Home Meds:  Current Meds:     Current Outpatient Medications:   •  aspirin 325 MG tablet, Take 325 mg by mouth Every 6 (Six) Hours As Needed for Mild Pain ., Disp: , Rfl:   •  buPROPion XL (WELLBUTRIN XL) 300 MG 24 hr tablet, Take 1 tablet by mouth Daily., Disp: 30 tablet, Rfl: 2  •  levothyroxine (SYNTHROID) 100 MCG tablet, Take 1 tablet by mouth Daily., Disp: 30 tablet, Rfl: 1  •  megestrol (MEGACE) 40 MG tablet, Take 2 tablets by mouth 2 (Two) Times a Day., Disp: , Rfl: 4  •  metoprolol succinate XL (TOPROL XL) 50 MG 24 hr tablet, Take 1 tablet by mouth Daily., Disp: 30 tablet, Rfl: 2  •  ARTHRITIS PAIN RELIEVER 650 MG 8 hr tablet, Take 1 tablet by mouth Every 8 (Eight) Hours As Needed for Pain., Disp: , Rfl: 0  •  dicyclomine (BENTYL) 20 MG tablet, Take 1 tablet by mouth Every 6 (Six) Hours As Needed (ABD CRAMPING)., Disp: 20 tablet, Rfl: 0  •  ibuprofen (ADVIL,MOTRIN) 600 MG tablet, Take 600  mg by mouth Every 6 (Six) Hours As Needed for Mild Pain ., Disp: , Rfl:   •  ondansetron ODT (ZOFRAN-ODT) 4 MG disintegrating tablet, Take 1 tablet by mouth Every 8 (Eight) Hours As Needed for Nausea or Vomiting., Disp: 10 tablet, Rfl: 0  Social History:   Social History     Tobacco Use   • Smoking status: Light Tobacco Smoker     Types: Cigarettes     Last attempt to quit:      Years since quittin.1   • Smokeless tobacco: Never Used   • Tobacco comment: one cigarette a week   Substance Use Topics   • Alcohol use: No     Frequency: Never     Comment: used to drink weekly      Family History:  Family History   Problem Relation Age of Onset   • Cancer Father    • Hyperlipidemia Father    • Stroke Father    • Depression Sister    • Learning disabilities Sister    • Diabetes Maternal Grandmother    • Cancer Maternal Grandmother    • Heart attack Maternal Grandfather    • Cancer Paternal Grandmother    • Emphysema Paternal Grandfather         Review of Systems : Review of Systems   Constitution: Negative for malaise/fatigue, weight gain and weight loss.   HENT: Negative for nosebleeds.    Cardiovascular: Positive for chest pain and leg swelling (ankles). Negative for dyspnea on exertion, near-syncope, palpitations and syncope.   Respiratory: Positive for shortness of breath. Negative for cough and hemoptysis.    Endocrine: Negative for cold intolerance, heat intolerance, polydipsia and polyphagia.   Hematologic/Lymphatic: Does not bruise/bleed easily.   Skin: Negative for rash.   Musculoskeletal: Negative for arthritis and back pain.   Gastrointestinal: Negative for diarrhea, hematochezia, melena, nausea and vomiting.   Genitourinary: Negative for dysuria and hematuria.   Neurological: Positive for dizziness. Negative for light-headedness, numbness and seizures.   Psychiatric/Behavioral: Negative for altered mental status.       Constitutional:  Heart Rate:  [93] 93  BP: (119)/(87) 119/87    Physical Exam   BP  "119/87   Pulse 93   Ht 162.6 cm (64\")   Wt (!) 170 kg (375 lb)   SpO2 93%   BMI 64.37 kg/m²   Physical Exam  General:  Appears in no acute distress, morbidly obese  Eyes: Sclera is anicteric,  conjunctiva is clear   HEENT:  No JVD. Thyroid not visibly enlarged. No mucosal pallor or cyanosis  Respiratory: Respirations regular and unlabored at rest.  Bilaterally good breath sounds, with good air entry in all fields. No crackles, rubs or wheezes auscultated  Cardiovascular: S1,S2 Regular rate and rhythm. No murmur, rub or gallop auscultated.  Trace pitting edema  Gastrointestinal: Abdomen soft, flat, non tender. Bowel sounds present.   Musculoskeletal:  No abnormal movements  Extremities: No digital clubbing or cyanosis  Skin: Color pink. Skin warm and dry to touch. No rashes  No xanthoma  Neuro: Alert and awake, no lateralizing deficits appreciated    Cardiographics  ECG: EKG tracing was  personally reviewed by me  Sinus rhythm at the rate of 97 bpm    Echocardiogram:   Results for orders placed during the hospital encounter of 11/18/19   Adult Transthoracic Echo Complete W/ Cont if Necessary Per Protocol    Narrative TDS:  Overall LV/RV size and function normal  No significant valvulopathy seen in limited suboptimal views mainly   ascertained by Doppler assessment  IVC not seen  Overall valvular morphology is not well seen  Suboptimal study but grossly LV and RV function are normal         Imaging  Chest X-ray:   Imaging Results (Last 24 Hours)     ** No results found for the last 24 hours. **          Lab Review: I have reviewed the labs                                Regis Wesley MD  2/18/2020, 1:45 PM      EMR Dragon/Transcription:   Dictated utilizing Dragon dictation  "

## 2020-02-19 PROBLEM — E66.01 SUPER-SUPER OBESE: Status: ACTIVE | Noted: 2020-02-19

## 2020-02-20 ENCOUNTER — ANESTHESIA EVENT (OUTPATIENT)
Dept: GASTROENTEROLOGY | Facility: HOSPITAL | Age: 30
End: 2020-02-20

## 2020-02-20 PROCEDURE — 93010 ELECTROCARDIOGRAM REPORT: CPT | Performed by: INTERNAL MEDICINE

## 2020-02-21 ENCOUNTER — ANESTHESIA (OUTPATIENT)
Dept: GASTROENTEROLOGY | Facility: HOSPITAL | Age: 30
End: 2020-02-21

## 2020-02-21 ENCOUNTER — HOSPITAL ENCOUNTER (OUTPATIENT)
Facility: HOSPITAL | Age: 30
Setting detail: HOSPITAL OUTPATIENT SURGERY
Discharge: HOME | End: 2020-02-21
Attending: SURGERY | Admitting: SURGERY

## 2020-02-21 VITALS
BODY MASS INDEX: 50.02 KG/M2 | SYSTOLIC BLOOD PRESSURE: 119 MMHG | WEIGHT: 293 LBS | OXYGEN SATURATION: 96 % | DIASTOLIC BLOOD PRESSURE: 86 MMHG | HEART RATE: 93 BPM | HEIGHT: 64 IN

## 2020-02-21 DIAGNOSIS — E66.01 SUPER-SUPER OBESE (HCC): ICD-10-CM

## 2020-02-21 PROCEDURE — 43239 EGD BIOPSY SINGLE/MULTIPLE: CPT | Performed by: SURGERY

## 2020-02-21 PROCEDURE — 88305 TISSUE EXAM BY PATHOLOGIST: CPT | Performed by: SURGERY

## 2020-02-21 PROCEDURE — 25010000002 PROPOFOL 10 MG/ML EMULSION: Performed by: ANESTHESIOLOGIST ASSISTANT

## 2020-02-21 PROCEDURE — 94660 CPAP INITIATION&MGMT: CPT

## 2020-02-21 PROCEDURE — 94799 UNLISTED PULMONARY SVC/PX: CPT

## 2020-02-21 RX ORDER — SODIUM CHLORIDE 0.9 % (FLUSH) 0.9 %
10 SYRINGE (ML) INJECTION EVERY 12 HOURS SCHEDULED
Status: DISCONTINUED | OUTPATIENT
Start: 2020-02-21 | End: 2020-02-21 | Stop reason: HOSPADM

## 2020-02-21 RX ORDER — PHENTERMINE HYDROCHLORIDE 37.5 MG/1
37.5 CAPSULE ORAL EVERY MORNING
Qty: 90 CAPSULE | Refills: 0 | Status: SHIPPED | OUTPATIENT
Start: 2020-02-21 | End: 2020-04-19

## 2020-02-21 RX ORDER — SODIUM CHLORIDE, SODIUM LACTATE, POTASSIUM CHLORIDE, CALCIUM CHLORIDE 600; 310; 30; 20 MG/100ML; MG/100ML; MG/100ML; MG/100ML
30 INJECTION, SOLUTION INTRAVENOUS CONTINUOUS
Status: DISCONTINUED | OUTPATIENT
Start: 2020-02-21 | End: 2020-02-21 | Stop reason: HOSPADM

## 2020-02-21 RX ORDER — SODIUM CHLORIDE 0.9 % (FLUSH) 0.9 %
10 SYRINGE (ML) INJECTION AS NEEDED
Status: DISCONTINUED | OUTPATIENT
Start: 2020-02-21 | End: 2020-02-21 | Stop reason: HOSPADM

## 2020-02-21 RX ORDER — PROPOFOL 10 MG/ML
VIAL (ML) INTRAVENOUS AS NEEDED
Status: DISCONTINUED | OUTPATIENT
Start: 2020-02-21 | End: 2020-02-21 | Stop reason: SURG

## 2020-02-21 RX ORDER — LIDOCAINE HYDROCHLORIDE 10 MG/ML
INJECTION, SOLUTION EPIDURAL; INFILTRATION; INTRACAUDAL; PERINEURAL AS NEEDED
Status: DISCONTINUED | OUTPATIENT
Start: 2020-02-21 | End: 2020-02-21 | Stop reason: SURG

## 2020-02-21 RX ORDER — SODIUM CHLORIDE 9 MG/ML
9 INJECTION, SOLUTION INTRAVENOUS CONTINUOUS PRN
Status: DISCONTINUED | OUTPATIENT
Start: 2020-02-21 | End: 2020-02-21 | Stop reason: HOSPADM

## 2020-02-21 RX ADMIN — PROPOFOL 200 MG: 10 INJECTION, EMULSION INTRAVENOUS at 14:47

## 2020-02-21 RX ADMIN — SODIUM CHLORIDE: 0.9 INJECTION, SOLUTION INTRAVENOUS at 14:39

## 2020-02-21 RX ADMIN — LIDOCAINE HYDROCHLORIDE 50 MG: 10 INJECTION, SOLUTION EPIDURAL; INFILTRATION; INTRACAUDAL; PERINEURAL at 14:47

## 2020-02-21 NOTE — ANESTHESIA PREPROCEDURE EVALUATION
Anesthesia Evaluation     Patient summary reviewed and Nursing notes reviewed   NPO Solid Status: > 8 hours  NPO Liquid Status: > 8 hours           Airway   Mallampati: I  TM distance: >3 FB  Neck ROM: full  No difficulty expected  Dental - normal exam     Pulmonary - normal exam   (+) sleep apnea,   Cardiovascular - normal exam    (+) hypertension,       Neuro/Psych  (+) psychiatric history,     GI/Hepatic/Renal/Endo    (+) morbid obesity,  thyroid problem hypothyroidism    Musculoskeletal     (+) neck pain,   Abdominal  - normal exam    Bowel sounds: normal.   Substance History      OB/GYN          Other                      Anesthesia Plan    ASA 3     MAC     intravenous induction     Anesthetic plan, all risks, benefits, and alternatives have been provided, discussed and informed consent has been obtained with: patient.

## 2020-02-21 NOTE — ANESTHESIA POSTPROCEDURE EVALUATION
Patient: Cindy Thompson    Procedure Summary     Date:  02/21/20 Room / Location:  [facility]    Anesthesia Start:  1443 Anesthesia Stop:  1502    Procedure:  ESOPHAGOGASTRODUODENOSCOPY WITH BIOPSY, (N/A ) Diagnosis:       Super-super obese (CMS/HCC)      (Super-super obese (CMS/HCC) [E66.01])    Surgeon:  Elle Bill MD Provider:  Sebastian Krishna MD    Anesthesia Type:  MAC ASA Status:  3          Anesthesia Type: MAC    Vitals  Vitals Value Taken Time   /68 2/21/2020  3:02 PM   Temp     Pulse 115 2/21/2020  3:04 PM   Resp     SpO2 97 % 2/21/2020  3:04 PM   Vitals shown include unvalidated device data.        Post Anesthesia Care and Evaluation    Patient location during evaluation: PACU  Patient participation: complete - patient participated  Level of consciousness: awake  Pain scale: See nurse's notes for pain score.  Pain management: adequate  Airway patency: patent  Anesthetic complications: No anesthetic complications  PONV Status: none  Cardiovascular status: acceptable  Respiratory status: acceptable  Hydration status: acceptable    Comments: Patient seen and examined postoperatively; vital signs stable; SpO2 greater than or equal to 90%; cardiopulmonary status stable; nausea/vomiting adequately controlled; pain adequately controlled; no apparent anesthesia complications; patient discharged from anesthesia care when discharge criteria were met

## 2020-02-21 NOTE — OP NOTE
Surgeon:  Elle Bill MD  Preoperative Diagnosis: Screening for bariatric surgery    Postoperative Diagnosis: normal    Procedure Performed: Esophagogastroduodenoscopy with biopsy of the antrum to check for H. pylori    Indications: The patient is interested in bariatric surgery for weight loss.  This is a screening EGD.    Procedure:     The procedure, indications, preparation and potential complications were explained to the patient, who indicated understanding and signed the corresponding consent forms.  The patient was identified, taken to the endoscopy suite, and placed on the left side down decubitus position.  The patient underwent a MAC anesthesia and was appropriately monitored through the case by the anesthesia personnel with continuous pulse oximetry, blood pressure, and cardiac monitoring.  A bite block was placed.  After adequate IV sedation and using a transoral technique a lubed flexible endoscope was placed in the hypopharynx and advanced to the second portion of the duodenum without difficulty. The scope was then withdrawn back into the antrum of the stomach.  Cold forcep biopsies of the antrum were taken to rule out Helicobacter pylori.  The scope was retroflexed noting the body, fundus and cardia.  The scope was then withdrawn back into the esophagus after decompressing the stomach.  The Z line was noted and GE junction measured from the incisors.  The scope was then completely withdrawn.  The patient tolerated the procedure well and left the endoscopy suite in stable condition.  The findings are listed below.        normal

## 2020-02-24 LAB
LAB AP CASE REPORT: NORMAL
PATH REPORT.FINAL DX SPEC: NORMAL
PATH REPORT.GROSS SPEC: NORMAL

## 2020-02-25 ENCOUNTER — OFFICE VISIT (OUTPATIENT)
Dept: BARIATRICS/WEIGHT MGMT | Facility: CLINIC | Age: 30
End: 2020-02-25

## 2020-02-25 ENCOUNTER — TELEPHONE (OUTPATIENT)
Dept: CARDIOLOGY | Facility: CLINIC | Age: 30
End: 2020-02-25

## 2020-02-25 VITALS
SYSTOLIC BLOOD PRESSURE: 133 MMHG | HEART RATE: 104 BPM | WEIGHT: 293 LBS | HEIGHT: 64 IN | BODY MASS INDEX: 50.02 KG/M2 | DIASTOLIC BLOOD PRESSURE: 90 MMHG

## 2020-02-25 DIAGNOSIS — E66.01 SUPER-SUPER OBESE (HCC): Primary | ICD-10-CM

## 2020-02-25 NOTE — PROGRESS NOTES
[facility]  [address] [place] [address]  [address] [place] [address]  Dept: [phone]  2/25/2020      Cindy Thompson.  [id number]  [id number]  1990  female      Chief Complaint   Patient presents with   • Obesity   • Nutrition Counseling   • Weight Loss       The patient is here for month SWL #3 of their pre-operative supervised weight loss visit. She had a loss of 14.2 lbs. Has met following goals: 1) Taking meds as prescribed 2) met weight loss of 5#-exceeded lost 14, 3) Drinking 64 fl oz of water . Patient is working on eliminating fast foods, fried foods, sweets and soda.  Cindy Thompson has been increasing her daily water intake. She  been exercising: walking.    Patient states they have made positive changes including eating health, walking and getting out more, weight loss, and starting to eat and drinking separately.  The patient admits to be struggling with nothing at this time.     Nothing at this time.     Review of Systems  Vitals:    02/25/20 1258   BP: 133/90   Pulse: 104     Patient Active Problem List   Diagnosis   • Uterine cancer (CMS/HCC)   • Hypertension   • Depression   • Learning disability   • Abdominal pain   • Abnormal urinalysis   • Arthropathy   • B12 deficiency   • Former smoker   • Headache, migraine   • Hypothyroidism   • Microscopic hematuria   • BMI 60.0-69.9, adult (CMS/HCC)   • Obesity   • Oligomenorrhea   • Other general symptoms and signs   • Stage 1 chronic kidney disease   • Tooth disorder   • Sore throat   • Morbid obesity (CMS/HCC)   • Pre-operative exam   • Super-super obese (CMS/HCC)     Body mass index is 61.76 kg/m².  Documented weights    02/25/20 1258   Weight: (!) 163 kg (359 lb 12.8 oz)     Weight change from last appointment:-14.2 lb   Weight change overall:+ 10.8 lb     Goals:  Eat & Drink Separately by next appointment at all meal(s), Exercise  20-30 minutes for  3 day  by next appointment and Weight loss goal 10 by next appointment    Discussion/Plan:  Obesity/Morbid Obesity: Currently the patient's weight is decreasing. I reviewed the appropriate dietary choices with the patient and encouraged the necessary changes. Recommended at least 60 grams of protein per day, around 33 grams of fats and less than 100 grams of carbohydrates. Reviewed calorie intake if patient wanted to calorie count and/or had BMR. Instructed patient to drink 64 ounces of water per day and exercise a minimum of 150 minutes per week including both cardio and strength training.  Discussed with pt also eating and drinking separately stopping 30 minutes before meals and 45 minutes after meals. Discussed the option of keeping a food journal which will help patient become more aware of the nutritional value of foods so they are more prepared after surgery.    The patient was given written materials from our office for education.   I answered all of the patients questions regarding dietary changes, exercise or surgical options.  Future Appointments   Date Time Provider Department Center   2/26/2020 11:00 AM [facility]  [facility]   2/26/2020 11:45 AM [facility] 2  [facility]   2/26/2020 12:00 PM [facility] TREADMI[facility] 2  [facility]   2/26/2020  1:00 PM [facility] 2  [facility]   2/27/2020  1:30 PM Regis Wesley MD [facility]     The patient will follow up in one month on.    PES: nothing     The total time spent face to face was 20 minutes with 20 minutes spent counseling.

## 2020-02-25 NOTE — PATIENT INSTRUCTIONS
Eat & Drink Separately by next appointment at all meal(s), Exercise  20-30 minutes for  3 day by next appointment and Weight loss goal 10 by next appointment

## 2020-02-25 NOTE — TELEPHONE ENCOUNTER
Pt seen recently for first time for cp and pre op,    pam was ordered,  Pt being seen this week for pam fu and pre op.    I do not need to get the clearance request from Dr Bill per Alana Nielson.

## 2020-02-26 ENCOUNTER — HOSPITAL ENCOUNTER (OUTPATIENT)
Dept: NUCLEAR MEDICINE | Facility: HOSPITAL | Age: 30
Discharge: HOME | End: 2020-02-26

## 2020-02-26 DIAGNOSIS — Z01.810 PREOPERATIVE CARDIOVASCULAR EXAMINATION: ICD-10-CM

## 2020-02-26 DIAGNOSIS — I10 ESSENTIAL HYPERTENSION: ICD-10-CM

## 2020-02-26 DIAGNOSIS — E78.5 DYSLIPIDEMIA: ICD-10-CM

## 2020-02-26 DIAGNOSIS — E66.01 SUPER-SUPER OBESE (HCC): ICD-10-CM

## 2020-02-26 DIAGNOSIS — R07.9 CHEST PAIN, UNSPECIFIED TYPE: ICD-10-CM

## 2020-02-26 DIAGNOSIS — Z82.49 FAMILY HISTORY OF PREMATURE CAD: ICD-10-CM

## 2020-02-26 LAB
BH CV NUCLEAR PRIOR STUDY: 3
BH CV STRESS BP STAGE 1: NORMAL
BH CV STRESS BP STAGE 2: NORMAL
BH CV STRESS COMMENTS STAGE 1: NORMAL
BH CV STRESS COMMENTS STAGE 2: NORMAL
BH CV STRESS DOSE REGADENOSON STAGE 1: 0.4
BH CV STRESS DURATION MIN STAGE 1: 0
BH CV STRESS DURATION MIN STAGE 2: 4
BH CV STRESS DURATION SEC STAGE 1: 10
BH CV STRESS DURATION SEC STAGE 2: 0
BH CV STRESS HR STAGE 1: 111
BH CV STRESS HR STAGE 2: 101
BH CV STRESS PROTOCOL 1: NORMAL
BH CV STRESS RECOVERY BP: NORMAL MMHG
BH CV STRESS RECOVERY HR: 101 BPM
BH CV STRESS STAGE 1: 1
BH CV STRESS STAGE 2: 2
MAXIMAL PREDICTED HEART RATE: 193 BPM
PERCENT MAX PREDICTED HR: 54.4 %
STRESS BASELINE BP: NORMAL MMHG
STRESS BASELINE HR: 90 BPM
STRESS PERCENT HR: 64 %
STRESS POST PEAK BP: NORMAL MMHG
STRESS POST PEAK HR: 105 BPM
STRESS TARGET HR: 164 BPM

## 2020-02-26 PROCEDURE — 78452 HT MUSCLE IMAGE SPECT MULT: CPT | Performed by: INTERNAL MEDICINE

## 2020-02-26 PROCEDURE — 93017 CV STRESS TEST TRACING ONLY: CPT

## 2020-02-26 PROCEDURE — A9500 TC99M SESTAMIBI: HCPCS | Performed by: INTERNAL MEDICINE

## 2020-02-26 PROCEDURE — 93016 CV STRESS TEST SUPVJ ONLY: CPT | Performed by: INTERNAL MEDICINE

## 2020-02-26 PROCEDURE — 25010000002 REGADENOSON 0.4 MG/5ML SOLUTION: Performed by: INTERNAL MEDICINE

## 2020-02-26 PROCEDURE — 78452 HT MUSCLE IMAGE SPECT MULT: CPT

## 2020-02-26 PROCEDURE — 0 TECHNETIUM SESTAMIBI: Performed by: INTERNAL MEDICINE

## 2020-02-26 PROCEDURE — 93018 CV STRESS TEST I&R ONLY: CPT | Performed by: INTERNAL MEDICINE

## 2020-02-26 RX ADMIN — TECHNETIUM TC 99M SESTAMIBI 1 DOSE: 1 INJECTION INTRAVENOUS at 13:20

## 2020-02-26 RX ADMIN — TECHNETIUM TC 99M SESTAMIBI 1 DOSE: 1 INJECTION INTRAVENOUS at 11:22

## 2020-02-26 RX ADMIN — REGADENOSON 0.4 MG: 0.08 INJECTION, SOLUTION INTRAVENOUS at 13:21

## 2020-02-27 ENCOUNTER — OFFICE VISIT (OUTPATIENT)
Dept: CARDIOLOGY | Facility: CLINIC | Age: 30
End: 2020-02-27

## 2020-02-27 VITALS
WEIGHT: 293 LBS | BODY MASS INDEX: 50.02 KG/M2 | HEIGHT: 64 IN | DIASTOLIC BLOOD PRESSURE: 85 MMHG | OXYGEN SATURATION: 94 % | SYSTOLIC BLOOD PRESSURE: 119 MMHG | HEART RATE: 86 BPM

## 2020-02-27 DIAGNOSIS — Z01.810 PREOPERATIVE CARDIOVASCULAR EXAMINATION: Primary | ICD-10-CM

## 2020-02-27 DIAGNOSIS — E78.5 DYSLIPIDEMIA: ICD-10-CM

## 2020-02-27 DIAGNOSIS — Z82.49 FAMILY HISTORY OF PREMATURE CAD: ICD-10-CM

## 2020-02-27 DIAGNOSIS — I10 ESSENTIAL HYPERTENSION: ICD-10-CM

## 2020-02-27 DIAGNOSIS — R73.03 PREDIABETES: ICD-10-CM

## 2020-02-27 DIAGNOSIS — E66.01 SUPER-SUPER OBESE (HCC): ICD-10-CM

## 2020-02-27 PROCEDURE — 99213 OFFICE O/P EST LOW 20 MIN: CPT | Performed by: INTERNAL MEDICINE

## 2020-02-27 NOTE — PROGRESS NOTES
Subjective:     Encounter Date:02/27/2020      Patient ID: Cindy Thompson is a 29 y.o. female.    Chief Complaint : Stress follow-up.  Preop for bariatric surgery  History of Present Illness      This is a 29-year-old with PMH of    -Morbid obesity  -Hypertension  -Dyslipidemia  -?  Prediabetes  -Hypothyroidism  -Depression, uterine CA,   - D&C  - smoker  -Positive family history of premature CAD in maternal grandfather in 40s    Here for for stress test follow-up.  Patient was seen for preoperative cardiovascular evaluation for bariatric surgery.  Patient is not very active and is morbidly obese.  Has history of hypertension cigarette smoker prediabetes positive family history of premature CAD in grandfather and has recurrent chest pain intermittently all her life since age 12.  Was told she has enlarged heart when she went to the emergency room.  Patient attempted to have an echocardiogram which was technically difficult.  Patient could not get up from the chair to climb up onto the examining table and was having difficulty stepping up to the examining table.  Patient's arterial blood pressure is 119/85, heart rate 86.  Patient had labs done 1/28/2020 where cholesterol was 190  HDL low at 31 CMP was normal.  Last hemoglobin A1c was 6.   EKG reviewed by me from 1/28/2020 shows sinus rhythm with rate of 97 bpm      Assessment:      -Recurrent chest pain of unclear etiology, negative stress test  -Cigarette smoker  -Hypertension  -Dyslipidemia with low HDL at 31  -Prediabetes  -Positive family history of premature CAD  -Preoperative cardiovascular evaluation for bariatric surgery/morbid obesity    Recommendations / Plan:        Counseled on smoking cessation  Reviewed stress test results with patient  To be okay from cardiovascular standpoint for bariatric surgery.  Patient is on aspirin not from cardiac reasons but for leg cramps  Consult on diet exercise weight loss  Advised walking exercise for low HDL           Assessment:         No diagnosis found.       Plan:         Past Medical History:  Past Medical History:   Diagnosis Date   • Depression    • Disease of thyroid gland    • Hypertension    • Learning disabilities    • Low back pain    • Obesity    • BEBA (obstructive sleep apnea)     uses CPAP   • Snoring    • Uterine cancer (CMS/HCC)     [facility]- Dr. Eva Belle     Past Surgical History:  Past Surgical History:   Procedure Laterality Date   • DILATATION AND CURETTAGE     • ENDOSCOPY N/A 2/21/2020    Procedure: ESOPHAGOGASTRODUODENOSCOPY WITH BIOPSY,;  Surgeon: Elle Bill MD;  Location: [facility] ENDOSCOPY;  Service: General;  Laterality: N/A;      Allergies:  No Known Allergies  Home Meds:  Current Meds:     Current Outpatient Medications:   •  levothyroxine (SYNTHROID) 100 MCG tablet, Take 1 tablet by mouth Daily., Disp: 30 tablet, Rfl: 1  •  megestrol (MEGACE) 40 MG tablet, Take 2 tablets by mouth 2 (Two) Times a Day., Disp: , Rfl: 4  •  metoprolol succinate XL (TOPROL XL) 50 MG 24 hr tablet, Take 1 tablet by mouth Daily., Disp: 30 tablet, Rfl: 2  •  phentermine 37.5 MG capsule, Take 1 capsule by mouth Every Morning., Disp: 90 capsule, Rfl: 0  •  ARTHRITIS PAIN RELIEVER 650 MG 8 hr tablet, Take 1 tablet by mouth Every 8 (Eight) Hours As Needed for Pain., Disp: , Rfl: 0  •  aspirin 325 MG tablet, Take 325 mg by mouth Every 6 (Six) Hours As Needed for Mild Pain ., Disp: , Rfl:   •  buPROPion XL (WELLBUTRIN XL) 300 MG 24 hr tablet, Take 1 tablet by mouth Daily., Disp: 30 tablet, Rfl: 2  •  dicyclomine (BENTYL) 20 MG tablet, Take 1 tablet by mouth Every 6 (Six) Hours As Needed (ABD CRAMPING)., Disp: 20 tablet, Rfl: 0  •  ibuprofen (ADVIL,MOTRIN) 600 MG tablet, Take 600 mg by mouth Every 6 (Six) Hours As Needed for Mild Pain ., Disp: , Rfl:   •  ondansetron ODT (ZOFRAN-ODT) 4 MG disintegrating tablet, Take 1 tablet by mouth Every 8 (Eight) Hours As Needed for Nausea or Vomiting., Disp: 10 tablet, Rfl:  "0  Social History:   Social History     Tobacco Use   • Smoking status: Light Tobacco Smoker     Types: Cigarettes     Last attempt to quit: 2012     Years since quittin.1   • Smokeless tobacco: Never Used   • Tobacco comment: one cigarette a week   Substance Use Topics   • Alcohol use: No     Frequency: Never     Comment: used to drink weekly      Family History:  Family History   Problem Relation Age of Onset   • Cancer Father    • Hyperlipidemia Father    • Stroke Father    • Depression Sister    • Learning disabilities Sister    • Diabetes Maternal Grandmother    • Cancer Maternal Grandmother    • Heart attack Maternal Grandfather    • Cancer Paternal Grandmother    • Emphysema Paternal Grandfather         The following portions of the patient's history were reviewed and updated as appropriate: allergies, current medications, past family history, past medical history, past social history, past surgical history and problem list.    Review of Systems   Constitution: Negative for malaise/fatigue.   Cardiovascular: Negative for chest pain, leg swelling and palpitations.   Respiratory: Negative for shortness of breath.    Skin: Negative for rash.   Neurological: Negative for dizziness, light-headedness and numbness.     Comprehensive review of systems were reviewed and all others review of systems were found to be negative other than HPI    Procedures Lexiscan Cardiolite negative for ischemia.  EKG reviewed by me from 2020 reveals sinus rhythm at the rate of 97 bpm       Objective:     Physical Exam  /85   Pulse 86   Ht 162.6 cm (64\")   Wt (!) 161 kg (356 lb)   SpO2 94%   BMI 61.11 kg/m²   General:  Appears in no acute distress  Eyes: Sclera is anicteric,  conjunctiva is clear   HEENT:  No JVD. Thyroid not visibly enlarged. No mucosal pallor or cyanosis  Respiratory: Respirations regular and unlabored at rest.  Bilaterally good breath sounds, with good air entry in all fields. No crackles, rubs or " wheezes auscultated  Cardiovascular: S1,S2 Regular rate and rhythm. No murmur, rub or gallop auscultated. No pretibial pitting edema  Gastrointestinal: Abdomen soft, flat, non tender. Bowel sounds present.   Musculoskeletal:  No abnormal movements  Extremities: No digital clubbing or cyanosis  Skin: Color pink. Skin warm and dry to touch. No rashes  No xanthoma  Neuro: Alert and awake, no lateralizing deficits appreciated    Lab Reviewed:

## 2020-03-27 ENCOUNTER — TELEPHONE (OUTPATIENT)
Dept: FAMILY MEDICINE CLINIC | Facility: CLINIC | Age: 30
End: 2020-03-27

## 2020-03-27 NOTE — TELEPHONE ENCOUNTER
Patient reports that she has been having excessive salivation.  She has tried brushing her teeth, drinking plenty of water, and using mouthwash, she states that it is really bothering her and causing her to bite her lips.  I informed her that there is not much that we can do for this.  I educated her to avoid foods that cause increased salivation such as sour foods and salty foods.  I also told her that it could be due to allergies and I like for her to try taking Zyrtec daily.  She is to call if symptoms do not improve

## 2020-04-20 ENCOUNTER — TELEMEDICINE (OUTPATIENT)
Dept: FAMILY MEDICINE CLINIC | Facility: CLINIC | Age: 30
End: 2020-04-20

## 2020-04-20 DIAGNOSIS — R21 RASH: Primary | ICD-10-CM

## 2020-04-20 PROCEDURE — 99213 OFFICE O/P EST LOW 20 MIN: CPT | Performed by: NURSE PRACTITIONER

## 2020-04-20 RX ORDER — DIAPER,BRIEF,INFANT-TODD,DISP
EACH MISCELLANEOUS 2 TIMES DAILY
Qty: 14 G | Refills: 1 | Status: SHIPPED | OUTPATIENT
Start: 2020-04-20 | End: 2020-09-23

## 2020-04-20 NOTE — PATIENT INSTRUCTIONS
Try applying a thin film of hydrocortisone cream to rash twice daily. Avoid getting it on your face  Continue with zyrtec  Keep area clean and dry  Call if no improvement.  May take benadryl as needed for itching, but it could cause drowsiness.

## 2020-04-20 NOTE — PROGRESS NOTES
Subjective   Cindy Thompson is a 29 y.o. female.     You have chosen to receive care through a telehealth visit.  Do you consent to use a video/audio connection for your medical care today? Yes    Pt presents for a video visit today with c/o a rash and itching on bilateral arms.  Symptoms started on right arm but it is now spreading to the left arm.  It started a couple of months ago.  It typically appears as the temperature outside gets warmer.  The rash consists of erythematic papules.  It is not itching at this time.  It stays near the AC of bilateral arms.  The itching comes and goes, and it occasionally burns.  Denies any drainage.  She has tried taking OTC allergy medication such as claritin, which doesn't help.  She has not tried any other medication.  Denies fever or chills.  Denies any CP, SOA, dizziness, or HA.  Pt reports that she has lost 31lbs since seeing the weight management center.             The following portions of the patient's history were reviewed and updated as appropriate: allergies, current medications, past family history, past medical history, past social history, past surgical history and problem list.    Review of Systems   Constitutional: Negative for chills and fever.   HENT: Negative for congestion, sinus pressure and sore throat.    Respiratory: Negative for chest tightness and shortness of breath.    Cardiovascular: Negative for chest pain and palpitations.   Musculoskeletal: Negative for myalgias.   Skin: Positive for rash (manav arms).   Neurological: Negative for dizziness and headache.       Objective   LMP 04/15/2020   Physical Exam   Constitutional: She is oriented to person, place, and time. She appears well-developed. No distress.   overnourished   HENT:   Head: Normocephalic and atraumatic.   Pulmonary/Chest: Effort normal.   Neurological: She is alert and oriented to person, place, and time.   Skin: Rash noted.        Area of skin colored papules in the AC space of arms. No  erythema or drainage noted.   Psychiatric: She has a normal mood and affect. Her behavior is normal. Judgment and thought content normal.         Assessment/Plan     Diagnoses and all orders for this visit:    1. Rash (Primary)  Comments:  unknown etiology  possibly heat rash or atopic dermatitis  try treating with hydrocortisone cream  call if no improvement  Orders:  -     hydrocortisone 1 % cream; Apply  topically to the appropriate area as directed 2 (Two) Times a Day.  Dispense: 14 g; Refill: 1        Approximately 15 mins spent on video visit

## 2020-04-21 ENCOUNTER — OFFICE VISIT (OUTPATIENT)
Dept: BARIATRICS/WEIGHT MGMT | Facility: CLINIC | Age: 30
End: 2020-04-21

## 2020-04-21 VITALS — WEIGHT: 293 LBS | BODY MASS INDEX: 50.02 KG/M2 | HEIGHT: 64 IN

## 2020-04-21 DIAGNOSIS — E66.9 SUPER OBESE: Primary | ICD-10-CM

## 2020-04-21 NOTE — PROGRESS NOTES
[facility]  [address] [place] [address]  [address] [place] [address]  Dept: [phone]  4/21/2020      Cindy Thompson.  [id number]  [id number]  1990  female      Chief Complaint   Patient presents with   • Obesity   • Nutrition Counseling   • Weight Loss       The patient is here for month SWL #5 of their pre-operative supervised weight loss visit. She had a loss of .6 lbs. Has partially met following goals: 1) Met eating 60-80 gm Protein per day, 2) partially met weight loss goal 3) Partially met exercise goal of 1-2x per week instead of 3x per week. Patient is working on eliminating fast foods, fried foods, sweets and soda.  Cindy Thompson has been increasing her daily water intake. She has been exercising: walking.    Patient states they have made positive changes including losing weight and found protein shake you like.  The patient admits to be struggling with eating-extra on Sundays.     Eat large portions     Breakfast: shake  Lunch: shake  Dinner: Mashed potatoes and mac & cheese  Snack: no  Drink: Water    Review of Systems  There were no vitals filed for this visit.  Patient Active Problem List   Diagnosis   • Uterine cancer (CMS/HCC)   • Hypertension   • Depression   • Learning disability   • Abdominal pain   • Abnormal urinalysis   • Arthropathy   • B12 deficiency   • Former smoker   • Headache, migraine   • Hypothyroidism   • Microscopic hematuria   • BMI 60.0-69.9, adult (CMS/HCC)   • Obesity   • Oligomenorrhea   • Other general symptoms and signs   • Stage 1 chronic kidney disease   • Tooth disorder   • Sore throat   • Morbid obesity (CMS/HCC)   • Pre-operative exam   • Super-super obese (CMS/HCC)     Body mass index is 58.84 kg/m².  Documented weights    04/21/20 1041   Weight: (!) 155 kg (342 lb 12.8 oz)     Weight change from last appointment:- .6 lb     Weight change overall: -.6 lb     Goals:  Exercise   20-30 minutes for  3 day by next appointment and Weight loss goal 2-5 lb  by next appointment    Discussion/Plan:  Obesity/Morbid Obesity: Currently the patient's weight is decreasing. I reviewed the appropriate dietary choices with the patient and encouraged the necessary changes. Recommended at least 60 grams of protein per day, around 33 grams of fats and less than 100 grams of carbohydrates. Reviewed calorie intake if patient wanted to calorie count and/or had BMR. Instructed patient to drink 64 ounces of water per day and exercise a minimum of 150 minutes per week including both cardio and strength training.  Discussed with pt also eating and drinking separately stopping 30 minutes before meals and 45 minutes after meals. Discussed the option of keeping a food journal which will help patient become more aware of the nutritional value of foods so they are more prepared after surgery.    The patient was given written materials from our office for education.   I answered all of the patients questions regarding dietary changes, exercise or surgical options.  Future Appointments   Date Time Provider Department Center   4/21/2020 11:00 AM Shira Adamson RD [facility] None     The patient will follow up in one month on.    PES:     The total time spent face to face was 20 minutes with 20 minutes spent counseling.

## 2020-04-21 NOTE — PATIENT INSTRUCTIONS
Exercise  20-30 minutes for  3 day by next appointment and Weight loss goal 2-5 lb  by next appointment

## 2020-04-23 ENCOUNTER — TRANSCRIBE ORDERS (OUTPATIENT)
Dept: SLEEP MEDICINE | Facility: HOSPITAL | Age: 30
End: 2020-04-23

## 2020-04-23 DIAGNOSIS — G47.33 OBSTRUCTIVE SLEEP APNEA (ADULT) (PEDIATRIC): Primary | ICD-10-CM

## 2020-05-21 ENCOUNTER — HOSPITAL ENCOUNTER (EMERGENCY)
Facility: HOSPITAL | Age: 30
Discharge: HOME | End: 2020-05-21
Attending: EMERGENCY MEDICINE | Admitting: EMERGENCY MEDICINE

## 2020-05-21 ENCOUNTER — APPOINTMENT (OUTPATIENT)
Dept: CT IMAGING | Facility: HOSPITAL | Age: 30
End: 2020-05-21

## 2020-05-21 ENCOUNTER — TRANSCRIBE ORDERS (OUTPATIENT)
Dept: ADMINISTRATIVE | Facility: HOSPITAL | Age: 30
End: 2020-05-21

## 2020-05-21 VITALS
RESPIRATION RATE: 18 BRPM | BODY MASS INDEX: 50.02 KG/M2 | SYSTOLIC BLOOD PRESSURE: 143 MMHG | TEMPERATURE: 98.2 F | HEART RATE: 87 BPM | OXYGEN SATURATION: 96 % | WEIGHT: 293 LBS | DIASTOLIC BLOOD PRESSURE: 97 MMHG | HEIGHT: 64 IN

## 2020-05-21 DIAGNOSIS — R10.9 ABDOMINAL PAIN, UNSPECIFIED ABDOMINAL LOCATION: Primary | ICD-10-CM

## 2020-05-21 DIAGNOSIS — R06.00 DYSPNEA, UNSPECIFIED TYPE: Primary | ICD-10-CM

## 2020-05-21 LAB
ALBUMIN SERPL-MCNC: 4 G/DL (ref 3.5–5.2)
ALBUMIN/GLOB SERPL: 1.2 G/DL
ALP SERPL-CCNC: 77 U/L (ref 39–117)
ALT SERPL W P-5'-P-CCNC: 32 U/L (ref 1–33)
ANION GAP SERPL CALCULATED.3IONS-SCNC: 13 MMOL/L (ref 5–15)
APTT PPP: 25.7 SECONDS (ref 24–31)
AST SERPL-CCNC: 24 U/L (ref 1–32)
BACTERIA UR QL AUTO: ABNORMAL /HPF
BASOPHILS # BLD AUTO: 0 10*3/MM3 (ref 0–0.2)
BASOPHILS NFR BLD AUTO: 0.6 % (ref 0–1.5)
BILIRUB SERPL-MCNC: 0.3 MG/DL (ref 0.2–1.2)
BILIRUB UR QL STRIP: ABNORMAL
BUN BLD-MCNC: 10 MG/DL (ref 6–20)
BUN/CREAT SERPL: 12.8 (ref 7–25)
CALCIUM SPEC-SCNC: 9.7 MG/DL (ref 8.6–10.5)
CHLORIDE SERPL-SCNC: 100 MMOL/L (ref 98–107)
CLARITY UR: ABNORMAL
CO2 SERPL-SCNC: 27 MMOL/L (ref 22–29)
COLOR UR: ABNORMAL
CREAT BLD-MCNC: 0.78 MG/DL (ref 0.57–1)
DEPRECATED RDW RBC AUTO: 48.1 FL (ref 37–54)
EOSINOPHIL # BLD AUTO: 0.2 10*3/MM3 (ref 0–0.4)
EOSINOPHIL NFR BLD AUTO: 3.7 % (ref 0.3–6.2)
ERYTHROCYTE [DISTWIDTH] IN BLOOD BY AUTOMATED COUNT: 18 % (ref 12.3–15.4)
GFR SERPL CREATININE-BSD FRML MDRD: 89 ML/MIN/1.73
GLOBULIN UR ELPH-MCNC: 3.3 GM/DL
GLUCOSE BLD-MCNC: 149 MG/DL (ref 65–99)
GLUCOSE UR STRIP-MCNC: NEGATIVE MG/DL
HCG SERPL QL: NEGATIVE
HCT VFR BLD AUTO: 38.3 % (ref 34–46.6)
HGB BLD-MCNC: 13.2 G/DL (ref 12–15.9)
HGB UR QL STRIP.AUTO: ABNORMAL
HOLD SPECIMEN: NORMAL
HYALINE CASTS UR QL AUTO: ABNORMAL /LPF
INR PPP: 1.03 (ref 0.9–1.1)
KETONES UR QL STRIP: ABNORMAL
LEUKOCYTE ESTERASE UR QL STRIP.AUTO: ABNORMAL
LIPASE SERPL-CCNC: 14 U/L (ref 13–60)
LYMPHOCYTES # BLD AUTO: 0.5 10*3/MM3 (ref 0.7–3.1)
LYMPHOCYTES NFR BLD AUTO: 8.7 % (ref 19.6–45.3)
MCH RBC QN AUTO: 26 PG (ref 26.6–33)
MCHC RBC AUTO-ENTMCNC: 34.4 G/DL (ref 31.5–35.7)
MCV RBC AUTO: 75.5 FL (ref 79–97)
MONOCYTES # BLD AUTO: 0.3 10*3/MM3 (ref 0.1–0.9)
MONOCYTES NFR BLD AUTO: 6.2 % (ref 5–12)
NEUTROPHILS # BLD AUTO: 4.4 10*3/MM3 (ref 1.7–7)
NEUTROPHILS NFR BLD AUTO: 80.8 % (ref 42.7–76)
NITRITE UR QL STRIP: NEGATIVE
NRBC BLD AUTO-RTO: 0 /100 WBC (ref 0–0.2)
PH UR STRIP.AUTO: <=5 [PH] (ref 5–8)
PLATELET # BLD AUTO: 217 10*3/MM3 (ref 140–450)
PMV BLD AUTO: 6.3 FL (ref 6–12)
POTASSIUM BLD-SCNC: 3.9 MMOL/L (ref 3.5–5.2)
PROT SERPL-MCNC: 7.3 G/DL (ref 6–8.5)
PROT UR QL STRIP: ABNORMAL
PROTHROMBIN TIME: 10.7 SECONDS (ref 9.6–11.7)
RBC # BLD AUTO: 5.07 10*6/MM3 (ref 3.77–5.28)
RBC # UR: ABNORMAL /HPF
REF LAB TEST METHOD: ABNORMAL
SODIUM BLD-SCNC: 140 MMOL/L (ref 136–145)
SP GR UR STRIP: 1.03 (ref 1–1.03)
SQUAMOUS #/AREA URNS HPF: ABNORMAL /HPF
UROBILINOGEN UR QL STRIP: ABNORMAL
WBC NRBC COR # BLD: 5.4 10*3/MM3 (ref 3.4–10.8)
WBC UR QL AUTO: ABNORMAL /HPF

## 2020-05-21 PROCEDURE — 96374 THER/PROPH/DIAG INJ IV PUSH: CPT

## 2020-05-21 PROCEDURE — 99283 EMERGENCY DEPT VISIT LOW MDM: CPT

## 2020-05-21 PROCEDURE — 84703 CHORIONIC GONADOTROPIN ASSAY: CPT | Performed by: EMERGENCY MEDICINE

## 2020-05-21 PROCEDURE — 0 IOPAMIDOL PER 1 ML: Performed by: EMERGENCY MEDICINE

## 2020-05-21 PROCEDURE — 80053 COMPREHEN METABOLIC PANEL: CPT | Performed by: EMERGENCY MEDICINE

## 2020-05-21 PROCEDURE — 85730 THROMBOPLASTIN TIME PARTIAL: CPT | Performed by: EMERGENCY MEDICINE

## 2020-05-21 PROCEDURE — 81001 URINALYSIS AUTO W/SCOPE: CPT | Performed by: EMERGENCY MEDICINE

## 2020-05-21 PROCEDURE — 74177 CT ABD & PELVIS W/CONTRAST: CPT

## 2020-05-21 PROCEDURE — 85025 COMPLETE CBC W/AUTO DIFF WBC: CPT | Performed by: EMERGENCY MEDICINE

## 2020-05-21 PROCEDURE — 83690 ASSAY OF LIPASE: CPT | Performed by: EMERGENCY MEDICINE

## 2020-05-21 PROCEDURE — P9612 CATHETERIZE FOR URINE SPEC: HCPCS

## 2020-05-21 PROCEDURE — 85610 PROTHROMBIN TIME: CPT | Performed by: EMERGENCY MEDICINE

## 2020-05-21 PROCEDURE — 96375 TX/PRO/DX INJ NEW DRUG ADDON: CPT

## 2020-05-21 PROCEDURE — 25010000002 MORPHINE PER 10 MG: Performed by: EMERGENCY MEDICINE

## 2020-05-21 PROCEDURE — 25010000002 ONDANSETRON PER 1 MG: Performed by: EMERGENCY MEDICINE

## 2020-05-21 RX ORDER — HYDROCODONE BITARTRATE AND ACETAMINOPHEN 5; 325 MG/1; MG/1
1 TABLET ORAL EVERY 4 HOURS PRN
Qty: 15 TABLET | Refills: 0 | Status: SHIPPED | OUTPATIENT
Start: 2020-05-21 | End: 2020-09-23

## 2020-05-21 RX ORDER — MORPHINE SULFATE 4 MG/ML
2 INJECTION, SOLUTION INTRAMUSCULAR; INTRAVENOUS ONCE
Status: COMPLETED | OUTPATIENT
Start: 2020-05-21 | End: 2020-05-21

## 2020-05-21 RX ORDER — ONDANSETRON 2 MG/ML
4 INJECTION INTRAMUSCULAR; INTRAVENOUS ONCE
Status: COMPLETED | OUTPATIENT
Start: 2020-05-21 | End: 2020-05-21

## 2020-05-21 RX ORDER — ONDANSETRON 4 MG/1
4 TABLET, FILM COATED ORAL EVERY 8 HOURS PRN
Qty: 20 TABLET | Refills: 0 | Status: SHIPPED | OUTPATIENT
Start: 2020-05-21 | End: 2020-09-23

## 2020-05-21 RX ADMIN — MORPHINE SULFATE 2 MG: 4 INJECTION INTRAVENOUS at 02:39

## 2020-05-21 RX ADMIN — IOPAMIDOL 100 ML: 755 INJECTION, SOLUTION INTRAVENOUS at 03:32

## 2020-05-21 RX ADMIN — ONDANSETRON 4 MG: 2 INJECTION INTRAMUSCULAR; INTRAVENOUS at 02:39

## 2020-05-21 NOTE — ED PROVIDER NOTES
Subjective   29-year-old female who is undergoing radiation treatment for endometrial cancer has had it daily for the past 3 weeks including today.  Patient complains of 1 week of lower abdominal pain, moderate, associated with vaginal bleeding urinary urgency.  Patient states the pain got worse this evening.  Nausea is present without vomiting, no diarrhea, no fever.          Review of Systems   Gastrointestinal: Positive for abdominal pain and nausea.   Genitourinary: Positive for vaginal bleeding.   All other systems reviewed and are negative.      Past Medical History:   Diagnosis Date   • Depression    • Disease of thyroid gland    • Hypertension    • Learning disabilities    • Low back pain    • Obesity    • BEBA (obstructive sleep apnea)     uses CPAP   • Snoring    • Uterine cancer (CMS/HCC)     [facility]- Dr. Eva Belle       No Known Allergies    Past Surgical History:   Procedure Laterality Date   • DILATATION AND CURETTAGE     • ENDOSCOPY N/A 2020    Procedure: ESOPHAGOGASTRODUODENOSCOPY WITH BIOPSY,;  Surgeon: Elle Bill MD;  Location: [facility] ENDOSCOPY;  Service: General;  Laterality: N/A;       Family History   Problem Relation Age of Onset   • Cancer Father    • Hyperlipidemia Father    • Stroke Father    • Arthritis Father         N/A   • Depression Sister         N/A   • Learning disabilities Sister    • Diabetes Maternal Grandmother    • Cancer Maternal Grandmother         N/A   • Heart attack Maternal Grandfather    • Heart disease Maternal Grandfather    • Cancer Paternal Grandmother         Bone Cancer   • Emphysema Paternal Grandfather    • Arthritis Mother         N/A   • Early death Brother         He  in his crib   • Miscarriages / Stillbirths Sister         Miscarriages       Social History     Socioeconomic History   • Marital status:      Spouse name: Not on file   • Number of children: Not on file   • Years of education: Not on file   • Highest education level: Not on  file   Tobacco Use   • Smoking status: Former Smoker     Packs/day: 0.00     Years: 0.00     Pack years: 0.00     Types: Cigarettes     Last attempt to quit: 2012     Years since quittin.3   • Smokeless tobacco: Never Used   • Tobacco comment: one cigarette a week   Substance and Sexual Activity   • Alcohol use: No     Frequency: Never     Comment: used to drink weekly   • Drug use: No   • Sexual activity: Yes     Partners: Male     Birth control/protection: None           Objective   Physical Exam   Constitutional: She is oriented to person, place, and time.   Well appearing morbidly obese female   HENT:   Head: Normocephalic and atraumatic.   Mouth/Throat: Oropharynx is clear and moist.   Eyes: Pupils are equal, round, and reactive to light. Conjunctivae are normal.   Neck: Normal range of motion. Neck supple.   Cardiovascular: Normal rate, regular rhythm and normal heart sounds.   Pulmonary/Chest: Effort normal and breath sounds normal.   Abdominal:   Obese, nontender   Musculoskeletal: She exhibits no edema or tenderness.   Neurological: She is alert and oriented to person, place, and time.   Skin: Skin is warm and dry. Capillary refill takes less than 2 seconds.   Psychiatric: She has a normal mood and affect. Her behavior is normal.       Procedures           ED Course                                           MDM  Number of Diagnoses or Management Options  Abdominal pain, unspecified abdominal location:   Diagnosis management comments: Results for orders placed or performed during the hospital encounter of 20  -Comprehensive Metabolic Panel       Result                      Value             Ref Range           Glucose                     149 (H)           65 - 99 mg/dL       BUN                         10                6 - 20 mg/dL        Creatinine                  0.78              0.57 - 1.00 *       Sodium                      140               136 - 145 mm*       Potassium                    3.9               3.5 - 5.2 mm*       Chloride                    100               98 - 107 mmo*       CO2                         27.0              22.0 - 29.0 *       Calcium                     9.7               8.6 - 10.5 m*       Total Protein               7.3               6.0 - 8.5 g/*       Albumin                     4.00              3.50 - 5.20 *       ALT (SGPT)                  32                1 - 33 U/L          AST (SGOT)                  24                1 - 32 U/L          Alkaline Phosphatase        77                39 - 117 U/L        Total Bilirubin             0.3               0.2 - 1.2 mg*       eGFR Non African Amer       89                >60 mL/min/1*       Globulin                    3.3               gm/dL               A/G Ratio                   1.2               g/dL                BUN/Creatinine Ratio        12.8              7.0 - 25.0          Anion Gap                   13.0              5.0 - 15.0 m*  -Protime-INR       Result                      Value             Ref Range           Protime                     10.7              9.6 - 11.7 S*       INR                         1.03              0.90 - 1.10    -aPTT       Result                      Value             Ref Range           PTT                         25.7              24.0 - 31.0 *  -Lipase       Result                      Value             Ref Range           Lipase                      14                13 - 60 U/L    -Urinalysis With Culture If Indicated - Urine, Catheter       Result                      Value             Ref Range           Color, UA                   Red (A)           Yellow, Straw       Appearance, UA              Cloudy (A)        Clear               pH, UA                      <=5.0             5.0 - 8.0           Specific [place], UA        1.026             1.005 - 1.030       Glucose, UA                 Negative          Negative            Ketones, UA                 Trace (A)          Negative            Bilirubin, UA                                 Negative        Small (1+) (A)       Blood, UA                                     Negative        Large (3+) (A)       Protein, UA                                   Negative        100 mg/dL (2+) (A)       Leuk Esterase, UA                             Negative        Small (1+) (A)       Nitrite, UA                 Negative          Negative            Urobilinogen, UA            0.2 E.U./dL       0.2 - 1.0 E.*  -CBC Auto Differential       Result                      Value             Ref Range           WBC                         5.40              3.40 - 10.80*       RBC                         5.07              3.77 - 5.28 *       Hemoglobin                  13.2              12.0 - 15.9 *       Hematocrit                  38.3              34.0 - 46.6 %       MCV                         75.5 (L)          79.0 - 97.0 *       MCH                         26.0 (L)          26.6 - 33.0 *       MCHC                        34.4              31.5 - 35.7 *       RDW                         18.0 (H)          12.3 - 15.4 %       RDW-SD                      48.1              37.0 - 54.0 *       MPV                         6.3               6.0 - 12.0 fL       Platelets                   217               140 - 450 10*       Neutrophil %                80.8 (H)          42.7 - 76.0 %       Lymphocyte %                8.7 (L)           19.6 - 45.3 %       Monocyte %                  6.2               5.0 - 12.0 %        Eosinophil %                3.7               0.3 - 6.2 %         Basophil %                  0.6               0.0 - 1.5 %         Neutrophils, Absolute       4.40              1.70 - 7.00 *       Lymphocytes, Absolute       0.50 (L)          0.70 - 3.10 *       Monocytes, Absolute         0.30              0.10 - 0.90 *       Eosinophils, Absolute       0.20              0.00 - 0.40 *       Basophils, Absolute         0.00               0.00 - 0.20 *       nRBC                        0.0               0.0 - 0.2 /1*  -hCG, Serum, Qualitative       Result                      Value             Ref Range           HCG Qualitative             Negative          Negative       -Urinalysis, Microscopic Only - Urine, Catheter       Result                      Value             Ref Range           RBC, UA                                       None Seen /H*   Too Numerous to Count (A)       WBC, UA                     3-5 (A)           None Seen /H*       Bacteria, UA                Trace (A)         None Seen /H*       Squamous Epithelial Ce*     3-6 (A)           None Seen, 0*       Hyaline Casts, UA           3-6               None Seen /L*       Methodology                                                   Automated Microscopy  -Gold Top - SST       Result                      Value             Ref Range           Extra Tube                                                   Ct Abdomen Pelvis With Contrast    Result Date: 5/21/2020   1.  No acute abnormality. 2.  Unchanged minimally enlarged precaval lymph node.. Electronically signed by:  Scarlett Coombs M.D.  5/21/2020 1:43 AM    Well, vital signs stable, unremarkable evaluation, inspect reviewed.       Amount and/or Complexity of Data Reviewed  Clinical lab tests: reviewed  Tests in the radiology section of CPT®: reviewed        Final diagnoses:   Abdominal pain, unspecified abdominal location            Jan South MD  05/21/20 0357

## 2020-05-26 ENCOUNTER — OFFICE VISIT (OUTPATIENT)
Dept: BARIATRICS/WEIGHT MGMT | Facility: CLINIC | Age: 30
End: 2020-05-26

## 2020-05-26 VITALS — BODY MASS INDEX: 50.02 KG/M2 | HEIGHT: 64 IN | WEIGHT: 293 LBS

## 2020-05-26 DIAGNOSIS — E66.9 SUPER OBESE: Primary | ICD-10-CM

## 2020-05-26 RX ORDER — LEVOTHYROXINE SODIUM 0.1 MG/1
100 TABLET ORAL DAILY
Qty: 30 TABLET | Refills: 2 | Status: SHIPPED | OUTPATIENT
Start: 2020-05-26 | End: 2020-09-23

## 2020-05-26 RX ORDER — LEVOTHYROXINE SODIUM 0.05 MG/1
50 TABLET ORAL
Status: CANCELLED | OUTPATIENT
Start: 2020-05-26

## 2020-05-26 RX ORDER — LEVOTHYROXINE SODIUM 0.1 MG/1
TABLET ORAL
COMMUNITY
Start: 2020-05-24 | End: 2020-05-26

## 2020-05-26 NOTE — PROGRESS NOTES
[facility]  [address] [place] [address]  [address] [place] [address]  Dept: [phone]  5/26/2020      Cindy Thompson.  [id number]  [id number]  1990  female      Chief Complaint   Patient presents with   • Obesity   • Nutrition Counseling   • Weight Loss       The patient is here for month SWL #6 of their pre-operative supervised weight loss visit. She had a loss of 11.3 lbs. Has partially met following goals: 1) Partially met exercise goal of 3x per week for 20-30 minutes, 2) Met weight loss goal 11 . Patient is working on eliminating fast foods, fried foods, sweets and soda.  Cindy Thompson has been increasing her daily water intake. She has been exercising: walking.    Patient states they have made positive changes including losing weight.  The patient admits to be struggling with eating 3 meals per day.     none    Review of Systems  There were no vitals filed for this visit.  Patient Active Problem List   Diagnosis   • Uterine cancer (CMS/HCC)   • Hypertension   • Depression   • Learning disability   • Abdominal pain   • Abnormal urinalysis   • Arthropathy   • B12 deficiency   • Former smoker   • Headache, migraine   • Hypothyroidism   • Microscopic hematuria   • BMI 60.0-69.9, adult (CMS/HCC)   • Obesity   • Oligomenorrhea   • Other general symptoms and signs   • Stage 1 chronic kidney disease   • Tooth disorder   • Sore throat   • Morbid obesity (CMS/HCC)   • Pre-operative exam   • Super-super obese (CMS/HCC)     Body mass index is 56.47 kg/m².  Documented weights    05/26/20 0929   Weight: (!) 149 kg (329 lb)     Weight change from last appointment: -11 lb   Weight change overall:-20.2 lb     Breakfast: Shake  Lunch: skip   Dinner: pea & carrots, mashed potatoes   Drinks: Water    Goals:  Exercise  20-30 minutes for  3 day by next appointment, Weight loss goal 2-5 lb  by next appointment and 60- 80 gm Protein per  day by next appointment    Discussion/Plan:  Obesity/Morbid Obesity: Currently the patient's weight is decreasing. I reviewed the appropriate dietary choices with the patient and encouraged the necessary changes. Recommended at least 60 grams of protein per day, around 33 grams of fats and less than 100 grams of carbohydrates. Reviewed calorie intake if patient wanted to calorie count and/or had BMR. Instructed patient to drink 64 ounces of water per day and exercise a minimum of 150 minutes per week including both cardio and strength training.  Discussed with pt also eating and drinking separately stopping 30 minutes before meals and 45 minutes after meals. Discussed the option of keeping a food journal which will help patient become more aware of the nutritional value of foods so they are more prepared after surgery.    The patient was given written materials from our office for education.   I answered all of the patients questions regarding dietary changes, exercise or surgical options.  Future Appointments   Date Time Provider Department Center   5/26/2020 10:00 AM Shira Adamson RD [facility] None   6/27/2020  9:45 AM  [facility]     The patient will follow up in one month on.    PES:     The total time spent face to face was 20 minutes with 20 minutes spent counseling.

## 2020-05-26 NOTE — PATIENT INSTRUCTIONS
Exercise  20-30 minutes for  3 day by next appointment, Weight loss goal 2-5 lb  by next appointment and 60- 80 gm Protein per day by next appointment

## 2020-05-27 ENCOUNTER — TRANSCRIBE ORDERS (OUTPATIENT)
Dept: SLEEP MEDICINE | Facility: HOSPITAL | Age: 30
End: 2020-05-27

## 2020-05-27 DIAGNOSIS — Z01.818 PREOP TESTING: Primary | ICD-10-CM

## 2020-06-03 ENCOUNTER — LAB (OUTPATIENT)
Dept: LAB | Facility: HOSPITAL | Age: 30
End: 2020-06-03

## 2020-06-03 DIAGNOSIS — Z01.818 PREOP TESTING: ICD-10-CM

## 2020-06-03 PROCEDURE — U0004 COV-19 TEST NON-CDC HGH THRU: HCPCS

## 2020-06-04 LAB
REF LAB TEST METHOD: NORMAL
SARS-COV-2 RNA RESP QL NAA+PROBE: NOT DETECTED

## 2020-06-05 ENCOUNTER — HOSPITAL ENCOUNTER (OUTPATIENT)
Dept: SLEEP MEDICINE | Facility: HOSPITAL | Age: 30
Discharge: HOME | End: 2020-06-05
Admitting: INTERNAL MEDICINE

## 2020-06-05 DIAGNOSIS — G47.33 OBSTRUCTIVE SLEEP APNEA (ADULT) (PEDIATRIC): ICD-10-CM

## 2020-06-05 PROCEDURE — 95811 POLYSOM 6/>YRS CPAP 4/> PARM: CPT

## 2020-06-12 DIAGNOSIS — G47.33 OSA TREATED WITH BIPAP: Primary | ICD-10-CM

## 2020-06-27 ENCOUNTER — APPOINTMENT (OUTPATIENT)
Dept: RESPIRATORY THERAPY | Facility: HOSPITAL | Age: 30
End: 2020-06-27

## 2020-06-30 ENCOUNTER — OFFICE VISIT (OUTPATIENT)
Dept: BARIATRICS/WEIGHT MGMT | Facility: CLINIC | Age: 30
End: 2020-06-30

## 2020-06-30 VITALS — BODY MASS INDEX: 50.02 KG/M2 | WEIGHT: 293 LBS | HEIGHT: 64 IN

## 2020-06-30 DIAGNOSIS — E66.9 SUPER OBESE: Primary | ICD-10-CM

## 2020-06-30 NOTE — PROGRESS NOTES
[facility]NAG[facility]  [address] [place] [address]  [address] [place] [address]  Dept: [phone]  6/30/2020      Cindy Thompson.  [id number]  [id number]  1990  female      No chief complaint on file.      The patient is here for month SWL#7 of their pre-operative supervised weight loss visit. She had a loss of 9 lbs. Has partially met following goals: 1) Met exercise 20-30 minutes 3x per week, 2) met weight loss goal 2-5 lb, 3) Partially 60-80 gm protein-does not look like it with 24 hr recall. Patient is working on eliminating fast foods, fried foods, sweets and soda.  Cindy Thompson has been increasing her daily water intake. She has been exercising: walking.    Patient states they have made positive changes including continue to lose weight  The patient admits to be struggling with none    none    Review of Systems  There were no vitals filed for this visit.  Patient Active Problem List   Diagnosis   • Uterine cancer (CMS/HCC)   • Hypertension   • Depression   • Learning disability   • Abdominal pain   • Abnormal urinalysis   • Arthropathy   • B12 deficiency   • Former smoker   • Headache, migraine   • Hypothyroidism   • Microscopic hematuria   • BMI 60.0-69.9, adult (CMS/HCC)   • Obesity   • Oligomenorrhea   • Other general symptoms and signs   • Stage 1 chronic kidney disease   • Tooth disorder   • Sore throat   • Morbid obesity (CMS/HCC)   • Pre-operative exam   • Super-super obese (CMS/HCC)     There is no height or weight on file to calculate BMI.  There were no vitals filed for this visit.  Weight change from last appointment:-9 lb   Weight change overall:-29.2 lb     Goals:  Exercise  20-30 minutes for  4 day by next appointment and Weight loss goal 2-5 lb  by next appointment    Discussion/Plan:  Obesity/Morbid Obesity: Currently the patient's weight is decreasing. I reviewed the appropriate dietary choices with the  patient and encouraged the necessary changes. Recommended at least 60 grams of protein per day, around 33 grams of fats and less than 100 grams of carbohydrates. Reviewed calorie intake if patient wanted to calorie count and/or had BMR. Instructed patient to drink 64 ounces of water per day and exercise a minimum of 150 minutes per week including both cardio and strength training.  Discussed with pt also eating and drinking separately stopping 30 minutes before meals and 45 minutes after meals. Discussed the option of keeping a food journal which will help patient become more aware of the nutritional value of foods so they are more prepared after surgery.    The patient was given written materials from our office for education.   I answered all of the patients questions regarding dietary changes, exercise or surgical options.  Future Appointments   Date Time Provider Department Center   7/28/2020  7:15 AM [facility]     The patient will follow up in one month on.    PES:     The total time spent face to face was 20 minutes with 20 minutes spent counseling.

## 2020-07-25 ENCOUNTER — LAB (OUTPATIENT)
Dept: LAB | Facility: HOSPITAL | Age: 30
End: 2020-07-25

## 2020-07-25 DIAGNOSIS — Z01.818 PREOP TESTING: Primary | ICD-10-CM

## 2020-07-25 PROCEDURE — U0002 COVID-19 LAB TEST NON-CDC: HCPCS

## 2020-07-25 PROCEDURE — C9803 HOPD COVID-19 SPEC COLLECT: HCPCS

## 2020-07-25 PROCEDURE — U0004 COV-19 TEST NON-CDC HGH THRU: HCPCS

## 2020-07-28 ENCOUNTER — HOSPITAL ENCOUNTER (OUTPATIENT)
Dept: RESPIRATORY THERAPY | Facility: HOSPITAL | Age: 30
Discharge: HOME | End: 2020-07-28
Admitting: INTERNAL MEDICINE

## 2020-07-28 DIAGNOSIS — R06.00 DYSPNEA, UNSPECIFIED TYPE: ICD-10-CM

## 2020-07-28 LAB
REF LAB TEST METHOD: NORMAL
SARS-COV-2 RNA RESP QL NAA+PROBE: NOT DETECTED

## 2020-07-28 PROCEDURE — A9270 NON-COVERED ITEM OR SERVICE: HCPCS | Performed by: INTERNAL MEDICINE

## 2020-07-28 PROCEDURE — 94060 EVALUATION OF WHEEZING: CPT

## 2020-07-28 PROCEDURE — 63710000001 ALBUTEROL SULFATE HFA 108 (90 BASE) MCG/ACT AEROSOL SOLUTION: Performed by: INTERNAL MEDICINE

## 2020-07-28 PROCEDURE — 94729 DIFFUSING CAPACITY: CPT

## 2020-07-28 PROCEDURE — 94726 PLETHYSMOGRAPHY LUNG VOLUMES: CPT

## 2020-07-28 RX ORDER — ALBUTEROL SULFATE 90 UG/1
2 AEROSOL, METERED RESPIRATORY (INHALATION) ONCE
Status: COMPLETED | OUTPATIENT
Start: 2020-07-28 | End: 2020-07-28

## 2020-07-28 RX ADMIN — ALBUTEROL SULFATE 2 PUFF: 108 AEROSOL, METERED RESPIRATORY (INHALATION) at 08:15

## 2020-09-10 DIAGNOSIS — I10 HYPERTENSION, UNSPECIFIED TYPE: ICD-10-CM

## 2020-09-10 RX ORDER — METOPROLOL SUCCINATE 50 MG/1
50 TABLET, EXTENDED RELEASE ORAL DAILY
Qty: 30 TABLET | Refills: 2 | Status: SHIPPED | OUTPATIENT
Start: 2020-09-10 | End: 2021-11-19

## 2020-09-15 ENCOUNTER — OFFICE VISIT (OUTPATIENT)
Dept: BARIATRICS/WEIGHT MGMT | Facility: CLINIC | Age: 30
End: 2020-09-15

## 2020-09-15 ENCOUNTER — PREP FOR SURGERY (OUTPATIENT)
Dept: OTHER | Facility: HOSPITAL | Age: 30
End: 2020-09-15

## 2020-09-15 VITALS — WEIGHT: 293 LBS | BODY MASS INDEX: 57.16 KG/M2

## 2020-09-15 DIAGNOSIS — E66.9 SUPER OBESE: Primary | ICD-10-CM

## 2020-09-15 PROBLEM — E66.01 MORBID OBESITY WITH BMI OF 50.0-59.9, ADULT (HCC): Status: ACTIVE | Noted: 2020-09-15

## 2020-09-15 RX ORDER — SODIUM CHLORIDE 0.9 % (FLUSH) 0.9 %
3-10 SYRINGE (ML) INJECTION AS NEEDED
Status: CANCELLED | OUTPATIENT
Start: 2020-09-15

## 2020-09-15 RX ORDER — CEFAZOLIN SODIUM IN 0.9 % NACL 3 G/100 ML
3 INTRAVENOUS SOLUTION, PIGGYBACK (ML) INTRAVENOUS
Status: CANCELLED | OUTPATIENT
Start: 2020-09-15

## 2020-09-15 RX ORDER — CHLORHEXIDINE GLUCONATE 0.12 MG/ML
15 RINSE ORAL SEE ADMIN INSTRUCTIONS
Status: CANCELLED | OUTPATIENT
Start: 2020-09-15

## 2020-09-15 RX ORDER — SODIUM CHLORIDE 0.9 % (FLUSH) 0.9 %
3 SYRINGE (ML) INJECTION EVERY 12 HOURS SCHEDULED
Status: CANCELLED | OUTPATIENT
Start: 2020-09-15

## 2020-09-15 RX ORDER — METOCLOPRAMIDE HYDROCHLORIDE 5 MG/ML
10 INJECTION INTRAMUSCULAR; INTRAVENOUS ONCE
Status: CANCELLED | OUTPATIENT
Start: 2020-09-15 | End: 2020-09-15

## 2020-09-15 RX ORDER — PANTOPRAZOLE SODIUM 40 MG/10ML
40 INJECTION, POWDER, LYOPHILIZED, FOR SOLUTION INTRAVENOUS ONCE
Status: CANCELLED | OUTPATIENT
Start: 2020-09-15 | End: 2020-09-15

## 2020-09-15 RX ORDER — SCOLOPAMINE TRANSDERMAL SYSTEM 1 MG/1
1 PATCH, EXTENDED RELEASE TRANSDERMAL ONCE
Status: CANCELLED | OUTPATIENT
Start: 2020-09-15 | End: 2020-09-15

## 2020-09-15 RX ORDER — SODIUM CHLORIDE, SODIUM LACTATE, POTASSIUM CHLORIDE, CALCIUM CHLORIDE 600; 310; 30; 20 MG/100ML; MG/100ML; MG/100ML; MG/100ML
100 INJECTION, SOLUTION INTRAVENOUS CONTINUOUS
Status: CANCELLED | OUTPATIENT
Start: 2020-09-15

## 2020-09-16 RX ORDER — URSODIOL 250 MG/1
250 TABLET, FILM COATED ORAL 2 TIMES DAILY
Qty: 60 TABLET | Refills: 5 | Status: SHIPPED | OUTPATIENT
Start: 2020-09-16 | End: 2020-10-16

## 2020-09-21 ENCOUNTER — HOSPITAL ENCOUNTER (EMERGENCY)
Facility: HOSPITAL | Age: 30
Discharge: HOME | End: 2020-09-21
Admitting: EMERGENCY MEDICINE

## 2020-09-21 VITALS
RESPIRATION RATE: 16 BRPM | DIASTOLIC BLOOD PRESSURE: 85 MMHG | OXYGEN SATURATION: 96 % | TEMPERATURE: 98.3 F | HEART RATE: 95 BPM | SYSTOLIC BLOOD PRESSURE: 129 MMHG | WEIGHT: 293 LBS | HEIGHT: 64 IN | BODY MASS INDEX: 50.02 KG/M2

## 2020-09-21 DIAGNOSIS — R51.9 SCALP TENDERNESS: Primary | ICD-10-CM

## 2020-09-21 PROCEDURE — 99283 EMERGENCY DEPT VISIT LOW MDM: CPT

## 2020-09-21 NOTE — ED PROVIDER NOTES
Subjective   Patient is a 30-year-old female presents with complaints of possible spider bite on her scalp that happened around 11:30 AM this morning.  Patient states she felt something on her head she tried to flick it off when it felt like something bit her.  Patient states she has had pain to the area since.  She denies any drainage or bleeding on the area.  States the pain has improved and currently states it only hurts when she touches the area.  She currently rates it a 0/10 severity.  She describes the pain as a burning type pain when she does palpate her scalp she denies any swelling that she is felt.  States she is taken no medications for symptoms.  She denies any headache, visual disturbances, chest pain or shortness of breath.  Patient has no other complaints          Review of Systems   Constitutional: Negative.    Respiratory: Negative.    Cardiovascular: Negative.    Musculoskeletal: Negative for arthralgias, back pain, neck pain and neck stiffness.   Skin: Positive for wound. Negative for color change, pallor and rash.   Neurological: Negative.        Past Medical History:   Diagnosis Date   • Depression    • Disease of thyroid gland    • Hypertension    • Learning disabilities    • Low back pain    • Obesity    • BEBA (obstructive sleep apnea)     uses CPAP   • Snoring    • Uterine cancer (CMS/HCC)     [facility]- Dr. Eva Belle       No Known Allergies    Past Surgical History:   Procedure Laterality Date   • DILATATION AND CURETTAGE     • ENDOSCOPY N/A 2/21/2020    Procedure: ESOPHAGOGASTRODUODENOSCOPY WITH BIOPSY,;  Surgeon: Elle Bill MD;  Location: [facility] ENDOSCOPY;  Service: General;  Laterality: N/A;       Family History   Problem Relation Age of Onset   • Cancer Father    • Hyperlipidemia Father    • Stroke Father    • Arthritis Father         N/A   • Depression Sister         N/A   • Learning disabilities Sister    • Diabetes Maternal Grandmother    • Cancer Maternal Grandmother          N/A   • Heart attack Maternal Grandfather    • Heart disease Maternal Grandfather    • Cancer Paternal Grandmother         Bone Cancer   • Emphysema Paternal Grandfather    • Arthritis Mother         N/A   • Early death Brother         He  in his crib   • Miscarriages / Stillbirths Sister         Miscarriages       Social History     Socioeconomic History   • Marital status:      Spouse name: Not on file   • Number of children: Not on file   • Years of education: Not on file   • Highest education level: Not on file   Tobacco Use   • Smoking status: Former Smoker     Packs/day: 0.00     Years: 0.00     Pack years: 0.00     Types: Cigarettes     Quit date:      Years since quittin.7   • Smokeless tobacco: Never Used   • Tobacco comment: one cigarette a week   Substance and Sexual Activity   • Alcohol use: No     Frequency: Never     Comment: used to drink weekly   • Drug use: No   • Sexual activity: Yes     Partners: Male     Birth control/protection: None           Objective   Physical Exam  Vitals signs and nursing note reviewed.   Constitutional:       General: She is not in acute distress.     Appearance: She is well-developed. She is not ill-appearing, toxic-appearing or diaphoretic.   HENT:      Head: Normocephalic and atraumatic.        Mouth/Throat:      Mouth: Mucous membranes are moist.      Pharynx: Oropharynx is clear.   Eyes:      General: No scleral icterus.     Extraocular Movements: Extraocular movements intact.      Pupils: Pupils are equal, round, and reactive to light.   Cardiovascular:      Rate and Rhythm: Normal rate and regular rhythm.      Heart sounds: No murmur. No friction rub. No gallop.    Pulmonary:      Effort: Pulmonary effort is normal. No respiratory distress.      Breath sounds: Normal breath sounds. No stridor. No decreased breath sounds, wheezing, rhonchi or rales.   Chest:      Chest wall: No tenderness.   Skin:     General: Skin is warm.      Capillary Refill:  "Capillary refill takes less than 2 seconds.      Findings: No rash.   Neurological:      Mental Status: She is alert and oriented to person, place, and time.      GCS: GCS eye subscore is 4. GCS verbal subscore is 5. GCS motor subscore is 6.   Psychiatric:         Mood and Affect: Mood normal.         Behavior: Behavior normal. Behavior is cooperative.         Procedures           ED Course    /85 (BP Location: Right arm, Patient Position: Sitting)   Pulse 94   Temp 98.3 °F (36.8 °C) (Oral)   Resp 16   Ht 162.6 cm (64\")   Wt (!) 147 kg (323 lb 3.1 oz)   SpO2 96%   BMI 55.48 kg/m²   .                                       MDM  Number of Diagnoses or Management Options  Diagnosis management comments: Chart Review:  Comorbidity: Depression hypothyroidism hypertension obstructive sleep apnea obesity  Disposition/Treatment:  Appropriate PPE was worn during exam and throughout all encounters with the patient.  While in the ED patient was afebrile and appeared nontoxic in no respiratory distress speaking in full sentences.  There was no signs of infection or bites along the scalp she was slightly tender.  Patient was reassured that there were no signs of infection she was advised use Tylenol or ibuprofen and as needed for pain and follow-up with her primary care for further evaluation and management.  Patient voiced understanding of discharge instructions along with signs and symptoms return to the ED.  Patient was stable at time of discharge and in agreement with plan.      Final diagnoses:   Scalp tenderness            Kasia Lopez PA  09/21/20 1502    "

## 2020-09-21 NOTE — ED NOTES
Pt c/o pain at site of spider bite to scalp that happened at 11:30 today.  No visible bite ophelia noted.     Sabi Allison RN  09/21/20 5019

## 2020-09-21 NOTE — DISCHARGE INSTRUCTIONS
Follow-up with your primary care provider in 3-5 days.  If you do not have a primary care provider call [phone] for help in finding one, or you may follow up with [facility] at [phone].    Tylenol or ibuprofen as needed for pain.    Turn to ED for any new or worsening symptoms including swelling, redness, drainage from your scalp, uncontrollable pain, or fever

## 2020-09-22 ENCOUNTER — LAB (OUTPATIENT)
Dept: LAB | Facility: HOSPITAL | Age: 30
End: 2020-09-22

## 2020-09-22 DIAGNOSIS — E03.9 HYPOTHYROIDISM, UNSPECIFIED TYPE: ICD-10-CM

## 2020-09-22 DIAGNOSIS — E66.01 MORBID OBESITY WITH BMI OF 60.0-69.9, ADULT (HCC): ICD-10-CM

## 2020-09-22 DIAGNOSIS — R79.9 ABNORMAL FINDING OF BLOOD CHEMISTRY, UNSPECIFIED: ICD-10-CM

## 2020-09-22 DIAGNOSIS — E66.9 SUPER OBESE: ICD-10-CM

## 2020-09-22 DIAGNOSIS — I10 HYPERTENSION, UNSPECIFIED TYPE: ICD-10-CM

## 2020-09-22 LAB
ABO GROUP BLD: NORMAL
ALBUMIN SERPL-MCNC: 4.1 G/DL (ref 3.5–5.2)
ALBUMIN/GLOB SERPL: 1.1 G/DL
ALP SERPL-CCNC: 90 U/L (ref 39–117)
ALT SERPL W P-5'-P-CCNC: 23 U/L (ref 1–33)
ANION GAP SERPL CALCULATED.3IONS-SCNC: 11.6 MMOL/L (ref 5–15)
AST SERPL-CCNC: 16 U/L (ref 1–32)
BILIRUB SERPL-MCNC: 0.3 MG/DL (ref 0–1.2)
BLD GP AB SCN SERPL QL: NEGATIVE
BUN SERPL-MCNC: 15 MG/DL (ref 6–20)
BUN/CREAT SERPL: 16.9 (ref 7–25)
CALCIUM SPEC-SCNC: 10.4 MG/DL (ref 8.6–10.5)
CHLORIDE SERPL-SCNC: 101 MMOL/L (ref 98–107)
CHOLEST SERPL-MCNC: 210 MG/DL (ref 0–200)
CO2 SERPL-SCNC: 28.4 MMOL/L (ref 22–29)
CREAT SERPL-MCNC: 0.89 MG/DL (ref 0.57–1)
DEPRECATED RDW RBC AUTO: 43.7 FL (ref 37–54)
ERYTHROCYTE [DISTWIDTH] IN BLOOD BY AUTOMATED COUNT: 14.7 % (ref 12.3–15.4)
GFR SERPL CREATININE-BSD FRML MDRD: 76 ML/MIN/1.73
GLOBULIN UR ELPH-MCNC: 3.7 GM/DL
GLUCOSE SERPL-MCNC: 112 MG/DL (ref 65–99)
HBA1C MFR BLD: 5.91 % (ref 4.8–5.6)
HCT VFR BLD AUTO: 40.5 % (ref 34–46.6)
HDLC SERPL-MCNC: 20 MG/DL (ref 40–60)
HGB BLD-MCNC: 13.1 G/DL (ref 12–15.9)
LDLC SERPL CALC-MCNC: 144 MG/DL (ref 0–100)
LDLC/HDLC SERPL: 7.21 {RATIO}
MCH RBC QN AUTO: 26.6 PG (ref 26.6–33)
MCHC RBC AUTO-ENTMCNC: 32.3 G/DL (ref 31.5–35.7)
MCV RBC AUTO: 82.2 FL (ref 79–97)
MRSA DNA SPEC QL NAA+PROBE: ABNORMAL
PLATELET # BLD AUTO: 403 10*3/MM3 (ref 140–450)
PMV BLD AUTO: 8.4 FL (ref 6–12)
POTASSIUM SERPL-SCNC: 4.5 MMOL/L (ref 3.5–5.2)
PROT SERPL-MCNC: 7.8 G/DL (ref 6–8.5)
RBC # BLD AUTO: 4.93 10*6/MM3 (ref 3.77–5.28)
RH BLD: POSITIVE
SODIUM SERPL-SCNC: 141 MMOL/L (ref 136–145)
T&S EXPIRATION DATE: NORMAL
TRIGL SERPL-MCNC: 229 MG/DL (ref 0–150)
TSH SERPL DL<=0.05 MIU/L-ACNC: 4.69 UIU/ML (ref 0.27–4.2)
VLDLC SERPL-MCNC: 45.8 MG/DL (ref 5–40)
WBC # BLD AUTO: 8.12 10*3/MM3 (ref 3.4–10.8)

## 2020-09-22 PROCEDURE — 36415 COLL VENOUS BLD VENIPUNCTURE: CPT | Performed by: SURGERY

## 2020-09-22 PROCEDURE — 86850 RBC ANTIBODY SCREEN: CPT | Performed by: SURGERY

## 2020-09-22 PROCEDURE — 85027 COMPLETE CBC AUTOMATED: CPT

## 2020-09-22 PROCEDURE — 86901 BLOOD TYPING SEROLOGIC RH(D): CPT | Performed by: SURGERY

## 2020-09-22 PROCEDURE — 84443 ASSAY THYROID STIM HORMONE: CPT

## 2020-09-22 PROCEDURE — 83036 HEMOGLOBIN GLYCOSYLATED A1C: CPT

## 2020-09-22 PROCEDURE — 80053 COMPREHEN METABOLIC PANEL: CPT

## 2020-09-22 PROCEDURE — 80061 LIPID PANEL: CPT

## 2020-09-22 PROCEDURE — 86901 BLOOD TYPING SEROLOGIC RH(D): CPT

## 2020-09-22 PROCEDURE — 86900 BLOOD TYPING SEROLOGIC ABO: CPT | Performed by: SURGERY

## 2020-09-22 PROCEDURE — 86900 BLOOD TYPING SEROLOGIC ABO: CPT

## 2020-09-22 PROCEDURE — 87641 MR-STAPH DNA AMP PROBE: CPT

## 2020-09-22 NOTE — PROGRESS NOTES
Subjective   Cindy Thompson is a 30 y.o. female.     Pt is here today for her yearly check up and with c/o abdominal rash.   Pt is going to be having a gastric sleeve for weight loss with Dr. Bill on 9/30/20.  Pt states that she is feeling pretty good overall, although she is having some pelvic cramping still.   Pt states that her right eye itches and bazzi frequently.  She has been to the eye doctor. She started taking allergy medication, but it has not helped.   She had blood work completed yesterday prior to surgery.     Rash- pt states that she has sores on her abdomen    HTN- pt is currently on metoprolol XL 50mg daily. Doing well on medication.  Denies CP, SOA, dizziness, or HA. She quit smoking months ago.     Hypothyroidism- pt is currently on synthroid 100mcg daily. Doing ok with medication. Since she has been taking it regularly she has been seeing a drop in her weight.    Uterine cancer- Current with Dr. Mccauley for uterine cancer.   Pt is also seeing radiation oncology for implanted radiation. She is not currently on any medication at this time.  She has a PET scan scheduled for November.    Labs- due  Pap smear- sees GYN oncology    Vaccines:  Flu- wants to wait until after her surgery  PNA- UTD  Tdap- 2019    Dental exam- UTD  Eye exam- UTD         The following portions of the patient's history were reviewed and updated as appropriate: allergies, current medications, past family history, past medical history, past social history, past surgical history and problem list.    Review of Systems   Constitutional: Negative for appetite change, chills, fatigue and fever.   HENT: Negative for congestion, ear pain, hearing loss, postnasal drip, rhinorrhea, sinus pressure, sore throat, swollen glands and trouble swallowing.         Watery eyes   Eyes: Negative for blurred vision, double vision, pain, discharge, itching and visual disturbance.   Respiratory: Negative for cough, chest tightness, shortness of  "breath and wheezing.    Cardiovascular: Negative for chest pain and palpitations.   Gastrointestinal: Negative for abdominal pain, blood in stool, constipation, diarrhea, nausea and vomiting.   Endocrine: Negative for polydipsia, polyphagia and polyuria.   Genitourinary: Positive for pelvic pain (cramping). Negative for dysuria, flank pain, frequency and urgency.   Musculoskeletal: Negative for arthralgias, back pain and myalgias.   Skin: Positive for rash and skin lesions.   Neurological: Negative for dizziness, weakness, numbness and headache.   Psychiatric/Behavioral: Negative for stress. The patient is not nervous/anxious.        Objective   /84 (BP Location: Left arm, Patient Position: Sitting, Cuff Size: Large Adult)   Pulse 86   Temp 97.1 °F (36.2 °C) (Temporal)   Ht 162.6 cm (64\")   Wt (!) 146 kg (321 lb)   SpO2 98%   BMI 55.10 kg/m²   Physical Exam  Constitutional:       General: She is not in acute distress.     Appearance: She is obese. She is not ill-appearing.   HENT:      Head: Normocephalic and atraumatic.   Eyes:      General:         Right eye: No discharge.         Left eye: No discharge.      Extraocular Movements: Extraocular movements intact.      Pupils: Pupils are equal, round, and reactive to light.   Neck:      Musculoskeletal: Normal range of motion.   Cardiovascular:      Rate and Rhythm: Normal rate and regular rhythm.   Pulmonary:      Effort: Pulmonary effort is normal.      Breath sounds: Normal breath sounds.   Abdominal:      Palpations: Abdomen is soft.   Musculoskeletal: Normal range of motion.   Skin:     General: Skin is warm and dry.   Neurological:      General: No focal deficit present.      Mental Status: She is alert and oriented to person, place, and time.   Psychiatric:         Mood and Affect: Mood normal.         Behavior: Behavior normal.         Thought Content: Thought content normal.         Judgment: Judgment normal.           Assessment/Plan "     Diagnoses and all orders for this visit:    1. Hypothyroidism, unspecified type (Primary)  Comments:  TSH mildly elevated  increase synthroid to 112 mcg  recheck TSH in 1 mo  Orders:  -     TSH; Future  -     levothyroxine (Synthroid) 112 MCG tablet; Take 1 tablet by mouth Daily.  Dispense: 30 tablet; Refill: 0  -     TSH; Future    2. Morbid obesity with BMI of 60.0-69.9, adult (CMS/[facility])  Comments:  work on diet and exercise  has gastric sleeve scheduled  Orders:  -     Hemoglobin A1c; Future    3. Hypertension, unspecified type  Comments:  stable  cont current med  Orders:  -     Lipid Panel; Future    4. Abnormal finding of blood chemistry, unspecified   -     Hemoglobin A1c; Future    5. Watery eyes  Comments:  appears to be allergies  try antihistamine eye drops  f/u eye doctor if no improvement  Orders:  -     olopatadine (Pataday) 0.1 % ophthalmic solution; Apply 1 drop to eye(s) as directed by provider 2 (Two) Times a Day.  Dispense: 5 mL; Refill: 12    6. MRSA (methicillin resistant staph aureus) culture positive  Comments:  positive nasal swab  start mupirocin bid x 2 wks  Orders:  -     mupirocin (Bactroban) 2 % ointment; Apply  topically to the appropriate area as directed 2 (Two) Times a Day for 14 days. Apply to each nostril  Dispense: 15 g; Refill: 0

## 2020-09-23 ENCOUNTER — OFFICE VISIT (OUTPATIENT)
Dept: FAMILY MEDICINE CLINIC | Facility: CLINIC | Age: 30
End: 2020-09-23

## 2020-09-23 VITALS
DIASTOLIC BLOOD PRESSURE: 84 MMHG | HEART RATE: 86 BPM | HEIGHT: 64 IN | WEIGHT: 293 LBS | BODY MASS INDEX: 50.02 KG/M2 | SYSTOLIC BLOOD PRESSURE: 122 MMHG | TEMPERATURE: 97.1 F | OXYGEN SATURATION: 98 %

## 2020-09-23 DIAGNOSIS — H04.203 WATERY EYES: ICD-10-CM

## 2020-09-23 DIAGNOSIS — R79.9 ABNORMAL FINDING OF BLOOD CHEMISTRY, UNSPECIFIED: ICD-10-CM

## 2020-09-23 DIAGNOSIS — Z22.322 MRSA (METHICILLIN RESISTANT STAPH AUREUS) CULTURE POSITIVE: ICD-10-CM

## 2020-09-23 DIAGNOSIS — I10 HYPERTENSION, UNSPECIFIED TYPE: ICD-10-CM

## 2020-09-23 DIAGNOSIS — R21 RASH: ICD-10-CM

## 2020-09-23 DIAGNOSIS — E03.9 HYPOTHYROIDISM, UNSPECIFIED TYPE: Primary | ICD-10-CM

## 2020-09-23 DIAGNOSIS — E66.01 MORBID OBESITY WITH BMI OF 60.0-69.9, ADULT (HCC): ICD-10-CM

## 2020-09-23 PROCEDURE — 99214 OFFICE O/P EST MOD 30 MIN: CPT | Performed by: NURSE PRACTITIONER

## 2020-09-23 RX ORDER — OLOPATADINE HYDROCHLORIDE 1 MG/ML
1 SOLUTION/ DROPS OPHTHALMIC 2 TIMES DAILY
Qty: 5 ML | Refills: 12 | Status: SHIPPED | OUTPATIENT
Start: 2020-09-23 | End: 2021-05-28

## 2020-09-23 RX ORDER — LEVOTHYROXINE SODIUM 112 UG/1
112 TABLET ORAL DAILY
Qty: 30 TABLET | Refills: 0 | Status: SHIPPED | OUTPATIENT
Start: 2020-09-23 | End: 2021-04-02 | Stop reason: SDUPTHER

## 2020-09-23 RX ORDER — TRAMADOL HYDROCHLORIDE 50 MG/1
50 TABLET ORAL EVERY 12 HOURS PRN
COMMUNITY
Start: 2020-07-09 | End: 2021-05-28

## 2020-09-23 RX ORDER — IBUPROFEN 600 MG/1
600 TABLET ORAL EVERY 6 HOURS
COMMUNITY
Start: 2020-07-09 | End: 2020-10-01 | Stop reason: HOSPADM

## 2020-09-23 RX ORDER — SULFAMETHOXAZOLE AND TRIMETHOPRIM 800; 160 MG/1; MG/1
1 TABLET ORAL 2 TIMES DAILY
Qty: 20 TABLET | Refills: 0 | Status: SHIPPED | OUTPATIENT
Start: 2020-09-23 | End: 2020-10-03

## 2020-09-23 NOTE — PATIENT INSTRUCTIONS
Increase synthroid to 112mcg daily  Have blood work rechecked in 1 mo  Increase exercise and work on eating a well-balanced diet.  Limit white foods (pasta, bread, rice, potatoes), saturated fats, sugary drinks, and dark meats.  Use antihistamine eye drops   If no improvement follow up with eye doctor.  Apply ointment twice daily into each nostril  Use a q-tip (new one each side) for 2 weeks  Complete oral antibiotic  Notify me if sores do not improve  Try hydrocortisone cream on rash on arm

## 2020-09-25 ENCOUNTER — CONSULT (OUTPATIENT)
Dept: BARIATRICS/WEIGHT MGMT | Facility: CLINIC | Age: 30
End: 2020-09-25

## 2020-09-25 VITALS
RESPIRATION RATE: 16 BRPM | DIASTOLIC BLOOD PRESSURE: 92 MMHG | BODY MASS INDEX: 50.02 KG/M2 | HEIGHT: 64 IN | TEMPERATURE: 97.3 F | SYSTOLIC BLOOD PRESSURE: 142 MMHG | OXYGEN SATURATION: 99 % | WEIGHT: 293 LBS | HEART RATE: 80 BPM

## 2020-09-25 DIAGNOSIS — E66.01 OBESITY, CLASS III, BMI 40-49.9 (MORBID OBESITY) (HCC): Primary | ICD-10-CM

## 2020-09-25 PROCEDURE — 99214 OFFICE O/P EST MOD 30 MIN: CPT | Performed by: SURGERY

## 2020-09-25 NOTE — PROGRESS NOTES
Bariatric Consult:  Referred by Dea Delaney APRN    Cindy Thompson is here today for consult on Consult (Pre Op)      History of Present Illness:     Cindy Thompson is a 30 y.o. female with morbid obesity with co-morbidities including sleep apnea who presents for surgical consultation for the above procedure. Cindy has completed the initial intake visit and has been examined by our nurse practitioner, dietician, psychologist and underwent the extensive educational teaching process under the guidance of our bariatric coordinator and myself. Cindy also has seen the educational video YOLI on the surgical procedure if available. Cindy attended today more educational teaching from our bariatric coordinator and myself. Cindy has had an extensive medical workup including a visit with their primary care physician, EKG, chest radiograph, blood work, EGD or UGI and possibly further testing. These have been reviewed by me and discussed with the patient. Cindy is now ready to proceed with surgery. Cindy presently denies nausea, vomiting, fever, chills, chest pain, shortness of air, melena, hematochezia, hemetemesis, dysuria, frequency, hematuria, jaundice or abdominal pain.     Wt Readings from Last 10 Encounters:   09/25/20 (!) 147 kg (323 lb)   09/23/20 (!) 146 kg (321 lb)   09/21/20 (!) 147 kg (323 lb 3.1 oz)   09/15/20 (!) 151 kg (333 lb)   06/30/20 (!) 145 kg (320 lb)   05/26/20 (!) 149 kg (329 lb)   05/21/20 (!) 154 kg (340 lb 6.2 oz)   04/21/20 (!) 155 kg (342 lb 12.8 oz)   04/19/20 (!) 156 kg (344 lb)   02/27/20 (!) 161 kg (356 lb)       Her pre-op EGD shows normal, no reflux symptoms      Past Medical History:   Diagnosis Date   • Anesthesia complication     lips swollen after surgeries, ? mouth gaurd, has trouble with respirations during surgery   • Depression    • Disease of thyroid gland    • Hypertension    • Learning disabilities    • Low back pain    • Obesity    • BEBA (obstructive sleep apnea)     uses CPAP   •  Snoring    • Uterine cancer (CMS/HCC)     [facility]- Dr. Eva Belle       No diagnosis found.    Past Surgical History:   Procedure Laterality Date   • DILATATION AND CURETTAGE     • ENDOSCOPY N/A 2/21/2020    Procedure: ESOPHAGOGASTRODUODENOSCOPY WITH BIOPSY,;  Surgeon: Elle Bill MD;  [facility] ENDOSCOPY;  Service: General;  Laterality: N/A;       Patient Active Problem List   Diagnosis   • Uterine cancer (CMS/HCC)   • Hypertension   • Depression   • Learning disability   • Abdominal pain   • Abnormal urinalysis   • Arthropathy   • B12 deficiency   • Former smoker   • Headache, migraine   • Hypothyroidism   • Microscopic hematuria   • BMI 60.0-69.9, adult (CMS/HCC)   • Obesity   • Oligomenorrhea   • Other general symptoms and signs   • Stage 1 chronic kidney disease   • Tooth disorder   • Sore throat   • Morbid obesity (CMS/HCC)   • Pre-operative exam   • Super-super obese (CMS/HCC)   • Morbid obesity with BMI of 50.0-59.9, adult (CMS/HCC)   • Super obese       No Known Allergies      Current Outpatient Medications:   •  acetaminophen (TYLENOL) 650 MG 8 hr tablet, 650 mg., Disp: , Rfl:   •  levothyroxine (Synthroid) 112 MCG tablet, Take 1 tablet by mouth Daily., Disp: 30 tablet, Rfl: 0  •  metoprolol succinate XL (TOPROL-XL) 50 MG 24 hr tablet, TAKE 1 TABLET BY MOUTH DAILY, Disp: 30 tablet, Rfl: 2  •  mupirocin (Bactroban) 2 % ointment, Apply  topically to the appropriate area as directed 2 (Two) Times a Day for 14 days. Apply to each nostril, Disp: 15 g, Rfl: 0  •  olopatadine (Pataday) 0.1 % ophthalmic solution, Apply 1 drop to eye(s) as directed by provider 2 (Two) Times a Day., Disp: 5 mL, Rfl: 12  •  sulfamethoxazole-trimethoprim (Bactrim DS) 800-160 MG per tablet, Take 1 tablet by mouth 2 (Two) Times a Day for 10 days., Disp: 20 tablet, Rfl: 0  •  ursodiol (ACTIGALL) 250 MG tablet, Take 1 tablet by mouth 2 (two) times a day for 30 days., Disp: 60 tablet, Rfl: 5  •  enoxaparin (Lovenox) 40  MG/0.4ML solution syringe, Inject 0.4 mL under the skin into the appropriate area as directed Every 12 (Twelve) Hours for 14 days., Disp: 11.2 mL, Rfl: 0  •  ibuprofen (ADVIL,MOTRIN) 600 MG tablet, Take 600 mg by mouth Every 6 (Six) Hours., Disp: , Rfl:   •  traMADol (ULTRAM) 50 MG tablet, Take 50 mg by mouth Every 12 (Twelve) Hours As Needed. for pain, Disp: , Rfl:     Social History     Socioeconomic History   • Marital status:      Spouse name: Not on file   • Number of children: Not on file   • Years of education: Not on file   • Highest education level: Not on file   Tobacco Use   • Smoking status: Former Smoker     Packs/day: 0.00     Years: 0.00     Pack years: 0.00     Types: Cigarettes     Quit date:      Years since quittin.7   • Smokeless tobacco: Never Used   Substance and Sexual Activity   • Alcohol use: No     Frequency: Never     Comment: used to drink weekly   • Drug use: No   • Sexual activity: Defer     Partners: Male     Birth control/protection: None       Family History   Problem Relation Age of Onset   • Cancer Father    • Hyperlipidemia Father    • Stroke Father    • Arthritis Father         N/A   • Depression Sister         N/A   • Learning disabilities Sister    • Diabetes Maternal Grandmother    • Cancer Maternal Grandmother         N/A   • Heart attack Maternal Grandfather    • Heart disease Maternal Grandfather    • Cancer Paternal Grandmother         Bone Cancer   • Emphysema Paternal Grandfather    • Arthritis Mother         N/A   • Early death Brother         He  in his crib   • Miscarriages / Stillbirths Sister         Miscarriages       Review of Systems:  Review of Systems   Constitutional: Negative.    HENT: Negative.    Eyes: Negative.    Respiratory: Negative.    Cardiovascular: Negative.    Gastrointestinal: Negative.    Endocrine: Negative.    Genitourinary: Negative.    Musculoskeletal: Negative.    Skin: Negative.    Allergic/Immunologic: Negative.     Neurological: Negative.    Hematological: Negative.    Psychiatric/Behavioral: Negative.          Physical Exam:    Vital Signs:  Weight: (!) 147 kg (323 lb)   Body mass index is 55.44 kg/m².  Temp: 97.3 °F (36.3 °C)   Heart Rate: 80   BP: 142/92       Physical Exam  Vitals signs reviewed.   Constitutional:       Appearance: She is well-developed.   HENT:      Head: Normocephalic and atraumatic.   Eyes:      Conjunctiva/sclera: Conjunctivae normal.      Pupils: Pupils are equal, round, and reactive to light.   Neck:      Musculoskeletal: Normal range of motion and neck supple.   Cardiovascular:      Rate and Rhythm: Normal rate and regular rhythm.      Heart sounds: Normal heart sounds.   Pulmonary:      Effort: Pulmonary effort is normal.      Breath sounds: Normal breath sounds.   Abdominal:      General: Bowel sounds are normal. There is no distension.      Palpations: Abdomen is soft. There is no mass.      Tenderness: There is no abdominal tenderness. There is no guarding or rebound.      Hernia: No hernia is present.   Musculoskeletal: Normal range of motion.   Skin:     General: Skin is warm and dry.   Neurological:      Mental Status: She is alert and oriented to person, place, and time.           Assessment:    Cindy Thompson is a 30 y.o. year old female with medically complicated severe obesity with a BMI of Body mass index is 55.44 kg/m². and multiple co-morbidities listed in the encounter diagnosis.    I think she is an appropriate candidate for this surgery, and is ready to proceed.      Plan/Discussion/Summary:        I instructed patient to start on a H2 blocker or proton pump inhibitor if not already on one of these medications.    I explained in detail the procedures that we perform.  All of these procedures have a chance to convert to open if any technical challenges or complications do occur.  Bariatric surgery is not cosmetic surgery but rather a tool to help a patient make a life-long commitment  lifestyle change including diet, exercise, behavior changes, and taking supplemental vitamins and minerals.    Problems after surgery may require more operations to correct them.    The risks, benefits, alternatives, and potential complications of all of the procedures were explained in detail including, but not limited to death, anesthesia and medication adverse effect, deep venous thrombosis, pulmonary embolism, trocar site/incisional hernia, wound infection, abdominal infection, bleeding, failure to lose weight, gain weight, a change in body image, metabolic complications with vitamin deficiences and anemia.    Weight loss expectations were discussed with the patient in detail. The weight loss operations most commonly performed are the sleeve gastrectomy and the Roxanne-en-Y gastric bypass. These operations result in weight losses up to approximately 25-35% of initial body weight 12 to 24 months after surgery with the gastric bypass usually the higher percent of weight loss but depends on patient using the tool.    For the gastric bypass and loop duodenal switch (SARA-S) the risks include but not limited to the following early complications:  Anastomotic leak/peritonitis, Roxanne/Alimentary/biliopancreatic limb obstruction, severe & minor wound infection/seroma, and nausea/vomiting.  Late complications can include but are not limited to malnutrition, vitamin deficiencies, frequent loose stools,  stomal stenosis, marginal ulcer, bowel obstruction, intussusception, internal, and incisional hernia.    Regarding the gastric sleeve, there is less long-term outcome data and higher risk of dysphagia and reflux compared to a gastric bypass, as well as risk of internal visceral/organ injury, splenectomy, bleeding, infection, leak (which could require further intervention possible conversion to Roxanne-en-Y gastric bypass), stenosis and possibility of regaining weight.    Cindy was counseled regarding diagnostic results,  instructions for management, risk factor reductions, prognosis, patient and family education, impressions, risks and benefits of treatment options and importance of compliance with treatment. Total face to face time of the encounter was over 45 minutes and over 30 minutes was spent counseling.     Catherine Report   As part of this patient's treatment plan I am prescribing controlled substances. The patient has been made aware of appropriate use of such medications, including potential risk of somnolence, limited ability to drive and /or work safely, and potential for dependence or overdose. It has also been made clear that these medications are for use by this patient only, without concomitant use of alcohol or other substances unless prescribed.    Cindy has completed prescribing agreement detailing terms of continued prescribing of controlled substances, including monitoring CATHERINE reports, urine drug screening, and pill counts if necessary. Cindy is aware that inappropriate use will result in cessation of prescribing such medications.    CATHERINE report has been reviewed      History and physical exam exhibit continued safe and appropriate use of controlled substances.      Cindy understands the surgical procedures and the different surgical options that are available.  She understands the lifestyle changes that are required after surgery and has agreed to follow the guidelines outlined in the weight management program.  She also expressed understanding of the risks involved and had all of female questions answered and desires to proceed.      Elle Bill MD  9/25/2020

## 2020-09-28 ENCOUNTER — LAB (OUTPATIENT)
Dept: LAB | Facility: HOSPITAL | Age: 30
End: 2020-09-28

## 2020-09-28 DIAGNOSIS — E03.9 HYPOTHYROIDISM, UNSPECIFIED TYPE: ICD-10-CM

## 2020-09-28 LAB — TSH SERPL DL<=0.05 MIU/L-ACNC: 1.56 UIU/ML (ref 0.27–4.2)

## 2020-09-28 PROCEDURE — U0004 COV-19 TEST NON-CDC HGH THRU: HCPCS

## 2020-09-28 PROCEDURE — 36415 COLL VENOUS BLD VENIPUNCTURE: CPT

## 2020-09-28 PROCEDURE — C9803 HOPD COVID-19 SPEC COLLECT: HCPCS

## 2020-09-28 PROCEDURE — 84443 ASSAY THYROID STIM HORMONE: CPT

## 2020-09-29 ENCOUNTER — ANESTHESIA EVENT (OUTPATIENT)
Dept: PERIOP | Facility: HOSPITAL | Age: 30
End: 2020-09-29

## 2020-09-29 LAB — SARS-COV-2 RNA NOSE QL NAA+PROBE: NOT DETECTED

## 2020-09-30 ENCOUNTER — HOSPITAL ENCOUNTER (INPATIENT)
Facility: HOSPITAL | Age: 30
LOS: 1 days | Discharge: HOME | End: 2020-10-01
Attending: SURGERY | Admitting: SURGERY

## 2020-09-30 ENCOUNTER — ANESTHESIA (OUTPATIENT)
Dept: PERIOP | Facility: HOSPITAL | Age: 30
End: 2020-09-30

## 2020-09-30 DIAGNOSIS — E66.01 MORBID OBESITY WITH BMI OF 50.0-59.9, ADULT (HCC): Primary | ICD-10-CM

## 2020-09-30 DIAGNOSIS — E66.9 SUPER OBESE: ICD-10-CM

## 2020-09-30 LAB — B-HCG UR QL: NEGATIVE

## 2020-09-30 PROCEDURE — 94799 UNLISTED PULMONARY SVC/PX: CPT

## 2020-09-30 PROCEDURE — 25010000002 MIDAZOLAM PER 1 MG: Performed by: NURSE ANESTHETIST, CERTIFIED REGISTERED

## 2020-09-30 PROCEDURE — 43775 LAP SLEEVE GASTRECTOMY: CPT | Performed by: SURGERY

## 2020-09-30 PROCEDURE — 88307 TISSUE EXAM BY PATHOLOGIST: CPT | Performed by: SURGERY

## 2020-09-30 PROCEDURE — 0DB64Z3 EXCISION OF STOMACH, PERCUTANEOUS ENDOSCOPIC APPROACH, VERTICAL: ICD-10-PCS | Performed by: SURGERY

## 2020-09-30 PROCEDURE — 25010000002 DEXAMETHASONE PER 1 MG: Performed by: NURSE ANESTHETIST, CERTIFIED REGISTERED

## 2020-09-30 PROCEDURE — 25010000002 VANCOMYCIN: Performed by: SURGERY

## 2020-09-30 PROCEDURE — 25010000002 FENTANYL CITRATE (PF) 100 MCG/2ML SOLUTION: Performed by: NURSE ANESTHETIST, CERTIFIED REGISTERED

## 2020-09-30 PROCEDURE — 25010000002 VANCOMYCIN 1 G RECONSTITUTED SOLUTION 1 EACH VIAL: Performed by: NURSE ANESTHETIST, CERTIFIED REGISTERED

## 2020-09-30 PROCEDURE — 25010000002 ENOXAPARIN PER 10 MG: Performed by: SURGERY

## 2020-09-30 PROCEDURE — 25010000002 ONDANSETRON PER 1 MG: Performed by: SURGERY

## 2020-09-30 PROCEDURE — 25010000002 SUCCINYLCHOLINE PER 20 MG: Performed by: NURSE ANESTHETIST, CERTIFIED REGISTERED

## 2020-09-30 PROCEDURE — 25010000002 ONDANSETRON PER 1 MG: Performed by: NURSE ANESTHETIST, CERTIFIED REGISTERED

## 2020-09-30 PROCEDURE — 94640 AIRWAY INHALATION TREATMENT: CPT

## 2020-09-30 PROCEDURE — 94660 CPAP INITIATION&MGMT: CPT

## 2020-09-30 PROCEDURE — 81025 URINE PREGNANCY TEST: CPT | Performed by: SURGERY

## 2020-09-30 PROCEDURE — 25010000002 HYDROMORPHONE PER 4 MG: Performed by: NURSE ANESTHETIST, CERTIFIED REGISTERED

## 2020-09-30 PROCEDURE — 25010000002 METOCLOPRAMIDE PER 10 MG: Performed by: SURGERY

## 2020-09-30 PROCEDURE — 25010000002 HYDROMORPHONE PER 4 MG: Performed by: SURGERY

## 2020-09-30 PROCEDURE — 25010000002 PROPOFOL 10 MG/ML EMULSION: Performed by: NURSE ANESTHETIST, CERTIFIED REGISTERED

## 2020-09-30 DEVICE — PERI-STRIPS DRY WITH VERITAS COLLAGEN MATRIX (PSD-V) IS PREPARED FROM DEHYDRATED BOVINE PERICARDIUM PROCURED FROM CATTLE UNDER 30 MONTHS OF AGE IN THE UNITED STATES. ONE (1) TUBE OF PSD GEL (GEL) IS PROVIDED FOR EVERY TWO (2) POUCHES OF PSD-V. THE GEL IS USED TO CREATE A TEMPORARY BOND BETWEEN THE PSD-V BUTTRESS AND THE SURGICAL STAPLER JAWS UNTIL THE STAPLER IS POSITIONED AND FIRED.
Type: IMPLANTABLE DEVICE | Status: FUNCTIONAL
Brand: PERI-STRIPS DRY WITH VERITAS COLLAGEN MATRIX

## 2020-09-30 DEVICE — SEALANT WND FIBRIN TISSEEL PREFIL/SYR/PRIMAFZ 4ML: Type: IMPLANTABLE DEVICE | Status: FUNCTIONAL

## 2020-09-30 DEVICE — ENDOPATH ECHELON ENDOSCOPIC LINEAR CUTTER RELOADS, GREEN, 60MM
Type: IMPLANTABLE DEVICE | Status: FUNCTIONAL
Brand: ECHELON ENDOPATH

## 2020-09-30 RX ORDER — NALOXONE HCL 0.4 MG/ML
0.4 VIAL (ML) INJECTION
Status: DISCONTINUED | OUTPATIENT
Start: 2020-09-30 | End: 2020-10-01 | Stop reason: HOSPADM

## 2020-09-30 RX ORDER — HALOPERIDOL 5 MG/ML
0.5 INJECTION INTRAMUSCULAR EVERY 4 HOURS PRN
Status: DISCONTINUED | OUTPATIENT
Start: 2020-09-30 | End: 2020-10-01 | Stop reason: HOSPADM

## 2020-09-30 RX ORDER — GABAPENTIN 300 MG/1
300 CAPSULE ORAL EVERY 8 HOURS SCHEDULED
Status: DISCONTINUED | OUTPATIENT
Start: 2020-09-30 | End: 2020-10-01 | Stop reason: HOSPADM

## 2020-09-30 RX ORDER — SODIUM CHLORIDE 0.9 % (FLUSH) 0.9 %
10 SYRINGE (ML) INJECTION AS NEEDED
Status: DISCONTINUED | OUTPATIENT
Start: 2020-09-30 | End: 2020-09-30 | Stop reason: HOSPADM

## 2020-09-30 RX ORDER — ACETAMINOPHEN 500 MG
1000 TABLET ORAL EVERY 6 HOURS SCHEDULED
Status: DISCONTINUED | OUTPATIENT
Start: 2020-09-30 | End: 2020-10-01 | Stop reason: HOSPADM

## 2020-09-30 RX ORDER — ONDANSETRON 2 MG/ML
4 INJECTION INTRAMUSCULAR; INTRAVENOUS ONCE AS NEEDED
Status: COMPLETED | OUTPATIENT
Start: 2020-09-30 | End: 2020-09-30

## 2020-09-30 RX ORDER — MORPHINE SULFATE 4 MG/ML
2 INJECTION, SOLUTION INTRAMUSCULAR; INTRAVENOUS
Status: DISCONTINUED | OUTPATIENT
Start: 2020-09-30 | End: 2020-10-01 | Stop reason: HOSPADM

## 2020-09-30 RX ORDER — DIPHENHYDRAMINE HYDROCHLORIDE 50 MG/ML
25 INJECTION INTRAMUSCULAR; INTRAVENOUS EVERY 4 HOURS PRN
Status: DISCONTINUED | OUTPATIENT
Start: 2020-09-30 | End: 2020-10-01 | Stop reason: HOSPADM

## 2020-09-30 RX ORDER — METOCLOPRAMIDE HYDROCHLORIDE 5 MG/ML
10 INJECTION INTRAMUSCULAR; INTRAVENOUS EVERY 6 HOURS PRN
Status: DISCONTINUED | OUTPATIENT
Start: 2020-09-30 | End: 2020-10-01 | Stop reason: HOSPADM

## 2020-09-30 RX ORDER — ONDANSETRON 2 MG/ML
INJECTION INTRAMUSCULAR; INTRAVENOUS AS NEEDED
Status: DISCONTINUED | OUTPATIENT
Start: 2020-09-30 | End: 2020-09-30 | Stop reason: SURG

## 2020-09-30 RX ORDER — METOCLOPRAMIDE HYDROCHLORIDE 5 MG/ML
10 INJECTION INTRAMUSCULAR; INTRAVENOUS EVERY 6 HOURS SCHEDULED
Status: DISCONTINUED | OUTPATIENT
Start: 2020-09-30 | End: 2020-10-01 | Stop reason: HOSPADM

## 2020-09-30 RX ORDER — ALBUTEROL SULFATE 2.5 MG/3ML
2.5 SOLUTION RESPIRATORY (INHALATION)
Status: DISCONTINUED | OUTPATIENT
Start: 2020-09-30 | End: 2020-10-01 | Stop reason: HOSPADM

## 2020-09-30 RX ORDER — ONDANSETRON 2 MG/ML
4 INJECTION INTRAMUSCULAR; INTRAVENOUS EVERY 6 HOURS PRN
Status: DISCONTINUED | OUTPATIENT
Start: 2020-09-30 | End: 2020-10-01 | Stop reason: HOSPADM

## 2020-09-30 RX ORDER — ROCURONIUM BROMIDE 10 MG/ML
INJECTION, SOLUTION INTRAVENOUS AS NEEDED
Status: DISCONTINUED | OUTPATIENT
Start: 2020-09-30 | End: 2020-09-30 | Stop reason: SURG

## 2020-09-30 RX ORDER — NALOXONE HCL 0.4 MG/ML
0.1 VIAL (ML) INJECTION
Status: DISCONTINUED | OUTPATIENT
Start: 2020-09-30 | End: 2020-10-01 | Stop reason: HOSPADM

## 2020-09-30 RX ORDER — CYANOCOBALAMIN 1000 UG/ML
1000 INJECTION, SOLUTION INTRAMUSCULAR; SUBCUTANEOUS ONCE
Status: COMPLETED | OUTPATIENT
Start: 2020-10-01 | End: 2020-10-01

## 2020-09-30 RX ORDER — PHENYLEPHRINE HCL IN 0.9% NACL 0.5 MG/5ML
SYRINGE (ML) INTRAVENOUS AS NEEDED
Status: DISCONTINUED | OUTPATIENT
Start: 2020-09-30 | End: 2020-09-30 | Stop reason: SURG

## 2020-09-30 RX ORDER — PANTOPRAZOLE SODIUM 40 MG/10ML
40 INJECTION, POWDER, LYOPHILIZED, FOR SOLUTION INTRAVENOUS ONCE
Status: COMPLETED | OUTPATIENT
Start: 2020-09-30 | End: 2020-09-30

## 2020-09-30 RX ORDER — FENTANYL CITRATE 50 UG/ML
INJECTION, SOLUTION INTRAMUSCULAR; INTRAVENOUS AS NEEDED
Status: DISCONTINUED | OUTPATIENT
Start: 2020-09-30 | End: 2020-09-30 | Stop reason: SURG

## 2020-09-30 RX ORDER — HYDROMORPHONE HCL 110MG/55ML
0.25 PATIENT CONTROLLED ANALGESIA SYRINGE INTRAVENOUS
Status: DISCONTINUED | OUTPATIENT
Start: 2020-09-30 | End: 2020-09-30 | Stop reason: HOSPADM

## 2020-09-30 RX ORDER — SODIUM CHLORIDE, SODIUM LACTATE, POTASSIUM CHLORIDE, CALCIUM CHLORIDE 600; 310; 30; 20 MG/100ML; MG/100ML; MG/100ML; MG/100ML
100 INJECTION, SOLUTION INTRAVENOUS CONTINUOUS
Status: DISCONTINUED | OUTPATIENT
Start: 2020-09-30 | End: 2020-09-30

## 2020-09-30 RX ORDER — LORAZEPAM 2 MG/ML
1 INJECTION INTRAMUSCULAR EVERY 12 HOURS PRN
Status: DISCONTINUED | OUTPATIENT
Start: 2020-09-30 | End: 2020-10-01 | Stop reason: HOSPADM

## 2020-09-30 RX ORDER — SODIUM CHLORIDE, SODIUM LACTATE, POTASSIUM CHLORIDE, CALCIUM CHLORIDE 600; 310; 30; 20 MG/100ML; MG/100ML; MG/100ML; MG/100ML
150 INJECTION, SOLUTION INTRAVENOUS CONTINUOUS
Status: DISCONTINUED | OUTPATIENT
Start: 2020-09-30 | End: 2020-10-01 | Stop reason: HOSPADM

## 2020-09-30 RX ORDER — MIRTAZAPINE 15 MG/1
15 TABLET, ORALLY DISINTEGRATING ORAL NIGHTLY
Status: DISCONTINUED | OUTPATIENT
Start: 2020-09-30 | End: 2020-10-01 | Stop reason: HOSPADM

## 2020-09-30 RX ORDER — FAMOTIDINE 10 MG/ML
20 INJECTION, SOLUTION INTRAVENOUS EVERY 12 HOURS SCHEDULED
Status: DISCONTINUED | OUTPATIENT
Start: 2020-09-30 | End: 2020-10-01 | Stop reason: HOSPADM

## 2020-09-30 RX ORDER — IPRATROPIUM BROMIDE AND ALBUTEROL SULFATE 2.5; .5 MG/3ML; MG/3ML
3 SOLUTION RESPIRATORY (INHALATION) EVERY 4 HOURS PRN
Status: DISCONTINUED | OUTPATIENT
Start: 2020-09-30 | End: 2020-09-30 | Stop reason: HOSPADM

## 2020-09-30 RX ORDER — SODIUM CHLORIDE 0.9 % (FLUSH) 0.9 %
3 SYRINGE (ML) INJECTION EVERY 12 HOURS SCHEDULED
Status: DISCONTINUED | OUTPATIENT
Start: 2020-09-30 | End: 2020-10-01 | Stop reason: HOSPADM

## 2020-09-30 RX ORDER — PROMETHAZINE HYDROCHLORIDE 12.5 MG/1
12.5 TABLET ORAL EVERY 6 HOURS PRN
Status: DISCONTINUED | OUTPATIENT
Start: 2020-09-30 | End: 2020-10-01 | Stop reason: HOSPADM

## 2020-09-30 RX ORDER — CEFAZOLIN SODIUM IN 0.9 % NACL 3 G/100 ML
3 INTRAVENOUS SOLUTION, PIGGYBACK (ML) INTRAVENOUS
Status: DISCONTINUED | OUTPATIENT
Start: 2020-09-30 | End: 2020-09-30 | Stop reason: HOSPADM

## 2020-09-30 RX ORDER — LEVOTHYROXINE SODIUM 112 UG/1
112 TABLET ORAL DAILY
Status: DISCONTINUED | OUTPATIENT
Start: 2020-10-01 | End: 2020-10-01 | Stop reason: HOSPADM

## 2020-09-30 RX ORDER — HYDROMORPHONE HCL 110MG/55ML
0.5 PATIENT CONTROLLED ANALGESIA SYRINGE INTRAVENOUS
Status: DISCONTINUED | OUTPATIENT
Start: 2020-09-30 | End: 2020-10-01 | Stop reason: HOSPADM

## 2020-09-30 RX ORDER — SODIUM CHLORIDE, SODIUM LACTATE, POTASSIUM CHLORIDE, CALCIUM CHLORIDE 600; 310; 30; 20 MG/100ML; MG/100ML; MG/100ML; MG/100ML
9 INJECTION, SOLUTION INTRAVENOUS CONTINUOUS PRN
Status: DISCONTINUED | OUTPATIENT
Start: 2020-09-30 | End: 2020-09-30

## 2020-09-30 RX ORDER — PROPOFOL 10 MG/ML
VIAL (ML) INTRAVENOUS AS NEEDED
Status: DISCONTINUED | OUTPATIENT
Start: 2020-09-30 | End: 2020-09-30 | Stop reason: SURG

## 2020-09-30 RX ORDER — PROMETHAZINE HYDROCHLORIDE 25 MG/1
25 TABLET ORAL ONCE AS NEEDED
Status: DISCONTINUED | OUTPATIENT
Start: 2020-09-30 | End: 2020-09-30 | Stop reason: HOSPADM

## 2020-09-30 RX ORDER — LORAZEPAM 1 MG/1
1 TABLET ORAL EVERY 12 HOURS PRN
Status: DISCONTINUED | OUTPATIENT
Start: 2020-09-30 | End: 2020-10-01 | Stop reason: HOSPADM

## 2020-09-30 RX ORDER — FENTANYL CITRATE 50 UG/ML
50 INJECTION, SOLUTION INTRAMUSCULAR; INTRAVENOUS
Status: DISCONTINUED | OUTPATIENT
Start: 2020-09-30 | End: 2020-09-30 | Stop reason: HOSPADM

## 2020-09-30 RX ORDER — PROMETHAZINE HYDROCHLORIDE 25 MG/1
25 SUPPOSITORY RECTAL ONCE AS NEEDED
Status: DISCONTINUED | OUTPATIENT
Start: 2020-09-30 | End: 2020-09-30 | Stop reason: HOSPADM

## 2020-09-30 RX ORDER — SODIUM CHLORIDE 0.9 % (FLUSH) 0.9 %
3-10 SYRINGE (ML) INJECTION AS NEEDED
Status: DISCONTINUED | OUTPATIENT
Start: 2020-09-30 | End: 2020-09-30 | Stop reason: HOSPADM

## 2020-09-30 RX ORDER — PROMETHAZINE HYDROCHLORIDE 12.5 MG/1
12.5 SUPPOSITORY RECTAL EVERY 6 HOURS PRN
Status: DISCONTINUED | OUTPATIENT
Start: 2020-09-30 | End: 2020-10-01 | Stop reason: HOSPADM

## 2020-09-30 RX ORDER — DEXAMETHASONE SODIUM PHOSPHATE 4 MG/ML
INJECTION, SOLUTION INTRA-ARTICULAR; INTRALESIONAL; INTRAMUSCULAR; INTRAVENOUS; SOFT TISSUE AS NEEDED
Status: DISCONTINUED | OUTPATIENT
Start: 2020-09-30 | End: 2020-09-30 | Stop reason: SURG

## 2020-09-30 RX ORDER — LABETALOL HYDROCHLORIDE 5 MG/ML
10 INJECTION, SOLUTION INTRAVENOUS
Status: DISCONTINUED | OUTPATIENT
Start: 2020-09-30 | End: 2020-10-01 | Stop reason: HOSPADM

## 2020-09-30 RX ORDER — SCOLOPAMINE TRANSDERMAL SYSTEM 1 MG/1
1 PATCH, EXTENDED RELEASE TRANSDERMAL ONCE
Status: DISCONTINUED | OUTPATIENT
Start: 2020-09-30 | End: 2020-09-30

## 2020-09-30 RX ORDER — MIDAZOLAM HYDROCHLORIDE 1 MG/ML
INJECTION INTRAMUSCULAR; INTRAVENOUS AS NEEDED
Status: DISCONTINUED | OUTPATIENT
Start: 2020-09-30 | End: 2020-09-30 | Stop reason: SURG

## 2020-09-30 RX ORDER — ONDANSETRON 4 MG/1
4 TABLET, FILM COATED ORAL EVERY 6 HOURS PRN
Status: DISCONTINUED | OUTPATIENT
Start: 2020-09-30 | End: 2020-10-01 | Stop reason: HOSPADM

## 2020-09-30 RX ORDER — CHLORHEXIDINE GLUCONATE 0.12 MG/ML
15 RINSE ORAL SEE ADMIN INSTRUCTIONS
Status: COMPLETED | OUTPATIENT
Start: 2020-09-30 | End: 2020-09-30

## 2020-09-30 RX ORDER — SUCCINYLCHOLINE CHLORIDE 20 MG/ML
INJECTION INTRAMUSCULAR; INTRAVENOUS AS NEEDED
Status: DISCONTINUED | OUTPATIENT
Start: 2020-09-30 | End: 2020-09-30 | Stop reason: SURG

## 2020-09-30 RX ORDER — METOCLOPRAMIDE HYDROCHLORIDE 5 MG/ML
10 INJECTION INTRAMUSCULAR; INTRAVENOUS ONCE
Status: COMPLETED | OUTPATIENT
Start: 2020-09-30 | End: 2020-09-30

## 2020-09-30 RX ORDER — ACETAMINOPHEN 325 MG/1
650 TABLET ORAL ONCE AS NEEDED
Status: DISCONTINUED | OUTPATIENT
Start: 2020-09-30 | End: 2020-09-30 | Stop reason: HOSPADM

## 2020-09-30 RX ORDER — LIDOCAINE HYDROCHLORIDE 20 MG/ML
INJECTION, SOLUTION EPIDURAL; INFILTRATION; INTRACAUDAL; PERINEURAL AS NEEDED
Status: DISCONTINUED | OUTPATIENT
Start: 2020-09-30 | End: 2020-09-30 | Stop reason: SURG

## 2020-09-30 RX ORDER — ACETAMINOPHEN 650 MG/1
650 SUPPOSITORY RECTAL ONCE AS NEEDED
Status: DISCONTINUED | OUTPATIENT
Start: 2020-09-30 | End: 2020-09-30 | Stop reason: HOSPADM

## 2020-09-30 RX ORDER — METOPROLOL SUCCINATE 50 MG/1
50 TABLET, EXTENDED RELEASE ORAL DAILY
Status: DISCONTINUED | OUTPATIENT
Start: 2020-10-01 | End: 2020-10-01 | Stop reason: HOSPADM

## 2020-09-30 RX ORDER — BUPIVACAINE HYDROCHLORIDE 5 MG/ML
INJECTION, SOLUTION PERINEURAL AS NEEDED
Status: DISCONTINUED | OUTPATIENT
Start: 2020-09-30 | End: 2020-09-30 | Stop reason: HOSPADM

## 2020-09-30 RX ADMIN — ROCURONIUM BROMIDE 20 MG: 10 INJECTION, SOLUTION INTRAVENOUS at 13:42

## 2020-09-30 RX ADMIN — PROPOFOL 200 MG: 10 INJECTION, EMULSION INTRAVENOUS at 13:03

## 2020-09-30 RX ADMIN — SODIUM CHLORIDE, SODIUM LACTATE, POTASSIUM CHLORIDE, AND CALCIUM CHLORIDE 150 ML/HR: 600; 310; 30; 20 INJECTION, SOLUTION INTRAVENOUS at 19:07

## 2020-09-30 RX ADMIN — ROCURONIUM BROMIDE 30 MG: 10 INJECTION, SOLUTION INTRAVENOUS at 13:17

## 2020-09-30 RX ADMIN — PHENYLEPHRINE HYDROCHLORIDE 100 MCG: 10 INJECTION INTRAVENOUS at 13:31

## 2020-09-30 RX ADMIN — SUCCINYLCHOLINE CHLORIDE 160 MG: 20 INJECTION, SOLUTION INTRAMUSCULAR; INTRAVENOUS at 13:04

## 2020-09-30 RX ADMIN — PHENYLEPHRINE HYDROCHLORIDE 100 MCG: 10 INJECTION INTRAVENOUS at 13:12

## 2020-09-30 RX ADMIN — FENTANYL CITRATE 50 MCG: 50 INJECTION, SOLUTION INTRAMUSCULAR; INTRAVENOUS at 13:25

## 2020-09-30 RX ADMIN — LIDOCAINE HYDROCHLORIDE 100 MG: 20 INJECTION, SOLUTION EPIDURAL; INFILTRATION; INTRACAUDAL; PERINEURAL at 13:03

## 2020-09-30 RX ADMIN — FAMOTIDINE 20 MG: 10 INJECTION, SOLUTION INTRAVENOUS at 21:49

## 2020-09-30 RX ADMIN — FENTANYL CITRATE 50 MCG: 50 INJECTION, SOLUTION INTRAMUSCULAR; INTRAVENOUS at 14:50

## 2020-09-30 RX ADMIN — MIDAZOLAM 2 MG: 1 INJECTION INTRAMUSCULAR; INTRAVENOUS at 12:58

## 2020-09-30 RX ADMIN — ENOXAPARIN SODIUM 40 MG: 40 INJECTION SUBCUTANEOUS at 12:49

## 2020-09-30 RX ADMIN — ONDANSETRON 4 MG: 2 INJECTION INTRAMUSCULAR; INTRAVENOUS at 16:48

## 2020-09-30 RX ADMIN — VANCOMYCIN HYDROCHLORIDE 2000 MG: 1 INJECTION, POWDER, LYOPHILIZED, FOR SOLUTION INTRAVENOUS at 14:23

## 2020-09-30 RX ADMIN — METOCLOPRAMIDE 10 MG: 5 INJECTION, SOLUTION INTRAMUSCULAR; INTRAVENOUS at 23:35

## 2020-09-30 RX ADMIN — CHLORHEXIDINE GLUCONATE 0.12% ORAL RINSE 15 ML: 1.2 LIQUID ORAL at 11:24

## 2020-09-30 RX ADMIN — PANTOPRAZOLE SODIUM 40 MG: 40 INJECTION, POWDER, FOR SOLUTION INTRAVENOUS at 11:24

## 2020-09-30 RX ADMIN — Medication 3 ML: at 21:49

## 2020-09-30 RX ADMIN — ACETAMINOPHEN 1000 MG: 500 TABLET ORAL at 17:50

## 2020-09-30 RX ADMIN — SUGAMMADEX 300 MG: 100 INJECTION, SOLUTION INTRAVENOUS at 14:24

## 2020-09-30 RX ADMIN — PHENYLEPHRINE HYDROCHLORIDE 100 MCG: 10 INJECTION INTRAVENOUS at 14:13

## 2020-09-30 RX ADMIN — PROPOFOL 100 MCG/KG/MIN: 10 INJECTION, EMULSION INTRAVENOUS at 13:13

## 2020-09-30 RX ADMIN — DEXAMETHASONE SODIUM PHOSPHATE 4 MG: 4 INJECTION, SOLUTION INTRAMUSCULAR; INTRAVENOUS at 14:17

## 2020-09-30 RX ADMIN — FOLIC ACID 250 ML/HR: 5 INJECTION, SOLUTION INTRAMUSCULAR; INTRAVENOUS; SUBCUTANEOUS at 23:35

## 2020-09-30 RX ADMIN — HYDROMORPHONE HYDROCHLORIDE 0.25 MG: 2 INJECTION, SOLUTION INTRAMUSCULAR; INTRAVENOUS; SUBCUTANEOUS at 15:11

## 2020-09-30 RX ADMIN — MIRTAZAPINE 15 MG: 15 TABLET, ORALLY DISINTEGRATING ORAL at 21:49

## 2020-09-30 RX ADMIN — SODIUM CHLORIDE, SODIUM LACTATE, POTASSIUM CHLORIDE, AND CALCIUM CHLORIDE 150 ML/HR: 600; 310; 30; 20 INJECTION, SOLUTION INTRAVENOUS at 18:02

## 2020-09-30 RX ADMIN — HYDROMORPHONE HYDROCHLORIDE 0.5 MG: 2 INJECTION, SOLUTION INTRAMUSCULAR; INTRAVENOUS; SUBCUTANEOUS at 16:47

## 2020-09-30 RX ADMIN — VANCOMYCIN HYDROCHLORIDE 1 G: 1 INJECTION, POWDER, LYOPHILIZED, FOR SOLUTION INTRAVENOUS at 14:13

## 2020-09-30 RX ADMIN — ONDANSETRON 4 MG: 2 INJECTION INTRAMUSCULAR; INTRAVENOUS at 14:22

## 2020-09-30 RX ADMIN — SODIUM CHLORIDE, SODIUM LACTATE, POTASSIUM CHLORIDE, AND CALCIUM CHLORIDE 500 ML: .6; .31; .03; .02 INJECTION, SOLUTION INTRAVENOUS at 11:24

## 2020-09-30 RX ADMIN — METOCLOPRAMIDE 10 MG: 5 INJECTION, SOLUTION INTRAMUSCULAR; INTRAVENOUS at 17:51

## 2020-09-30 RX ADMIN — FENTANYL CITRATE 100 MCG: 50 INJECTION, SOLUTION INTRAMUSCULAR; INTRAVENOUS at 13:03

## 2020-09-30 RX ADMIN — PHENYLEPHRINE HYDROCHLORIDE 100 MCG: 10 INJECTION INTRAVENOUS at 13:51

## 2020-09-30 RX ADMIN — Medication 10 ML: at 11:24

## 2020-09-30 RX ADMIN — SCOPALAMINE 1 PATCH: 1 PATCH, EXTENDED RELEASE TRANSDERMAL at 11:24

## 2020-09-30 RX ADMIN — ALBUTEROL SULFATE 2.5 MG: 2.5 SOLUTION RESPIRATORY (INHALATION) at 19:25

## 2020-09-30 RX ADMIN — GABAPENTIN 300 MG: 300 CAPSULE ORAL at 21:49

## 2020-09-30 RX ADMIN — METOCLOPRAMIDE 10 MG: 5 INJECTION, SOLUTION INTRAMUSCULAR; INTRAVENOUS at 11:24

## 2020-09-30 RX ADMIN — ACETAMINOPHEN 1000 MG: 500 TABLET ORAL at 23:35

## 2020-09-30 RX ADMIN — ONDANSETRON 4 MG: 2 INJECTION, SOLUTION INTRAMUSCULAR; INTRAVENOUS at 15:05

## 2020-09-30 NOTE — ANESTHESIA PROCEDURE NOTES
Airway  Urgency: elective    Date/Time: 9/30/2020 1:09 PM  Airway not difficult    General Information and Staff    Patient location during procedure: OR  Anesthesiologist: Alfredo Espinoza MD  CRNA: Myrna Delgado CRNA    Indications and Patient Condition  Indications for airway management: airway protection    Preoxygenated: yes  MILS maintained throughout  Mask difficulty assessment: 1 - vent by mask    Final Airway Details  Final airway type: endotracheal airway      Successful airway: ETT  Cuffed: yes   Successful intubation technique: video laryngoscopy  Facilitating devices/methods: intubating stylet  Endotracheal tube insertion site: oral  Blade: Glidescope  Blade size: 3  Cormack-Lehane Classification: grade I - full view of glottis  Placement verified by: capnometry and palpation of cuff   Measured from: lips  ETT/EBT  to lips (cm): 20  Number of attempts at approach: 1  Assessment: lips, teeth, and gum same as pre-op

## 2020-09-30 NOTE — ANESTHESIA POSTPROCEDURE EVALUATION
Patient: Cindy Thompson    Procedure Summary     Date: 09/30/20 Room / Location: [facility] OR 08 / [facility] MAIN OR    Anesthesia Start: 1255 Anesthesia Stop: 1453    Procedure: GASTRIC SLEEVE LAPAROSCOPIC (N/A Abdomen) Diagnosis:       Super obese      (Super obese [E66.9])    Surgeon: Elle Bill MD Provider: Alfredo Espinoza MD    Anesthesia Type: general ASA Status: 4          Anesthesia Type: general    Vitals  Vitals Value Taken Time   /91 09/30/20 1557   Temp 97.1 °F (36.2 °C) 09/30/20 1545   Pulse 85 09/30/20 1557   Resp 19 09/30/20 1557   SpO2 98 % 09/30/20 1557           Anesthesia Post Evaluation

## 2020-09-30 NOTE — ANESTHESIA POSTPROCEDURE EVALUATION
Patient: Cindy Thompson    Procedure Summary     Date: 09/30/20 Room / Location: [facility] OR 08 / [facility] MAIN OR    Anesthesia Start: 1255 Anesthesia Stop: 1453    Procedure: GASTRIC SLEEVE LAPAROSCOPIC (N/A Abdomen) Diagnosis:       Super obese      (Super obese [E66.9])    Surgeon: Elle Bill MD Provider: Alfredo Espinoza MD    Anesthesia Type: general ASA Status: 4          Anesthesia Type: general    Vitals  Vitals Value Taken Time   /91 09/30/20 1557   Temp 97.1 °F (36.2 °C) 09/30/20 1545   Pulse 85 09/30/20 1557   Resp 19 09/30/20 1557   SpO2 98 % 09/30/20 1557           Post Anesthesia Care and Evaluation    Patient location during evaluation: PACU  Patient participation: complete - patient participated  Level of consciousness: awake  Pain scale: See nurse's notes for pain score.  Pain management: adequate  Airway patency: patent  Anesthetic complications: No anesthetic complications  PONV Status: none  Cardiovascular status: acceptable  Respiratory status: acceptable  Hydration status: acceptable    Comments: Patient seen and examined postoperatively; vital signs stable; SpO2 greater than or equal to 90%; cardiopulmonary status stable; nausea/vomiting adequately controlled; pain adequately controlled; no apparent anesthesia complications; patient discharged from anesthesia care when discharge criteria were met

## 2020-09-30 NOTE — ANESTHESIA PREPROCEDURE EVALUATION
Anesthesia Evaluation     Patient summary reviewed and Nursing notes reviewed   NPO Solid Status: > 8 hours  NPO Liquid Status: > 8 hours           Airway   Mallampati: II  TM distance: >3 FB  Neck ROM: full  Difficult intubation highly probable and Small opening  Dental - normal exam     Pulmonary - normal exam    breath sounds clear to auscultation  (+) sleep apnea on CPAP,   Cardiovascular - normal exam  Exercise tolerance: unable to assess    ECG reviewed  Rhythm: regular  Rate: normal    (+) hypertension,     ROS comment: Normal Stress and ECHO    Neuro/Psych  (+) headaches,     GI/Hepatic/Renal/Endo    (+) morbid obesity,      Musculoskeletal     (+) neck pain,   Abdominal  - normal exam   Substance History - negative use     OB/GYN negative ob/gyn ROS         Other      history of cancer remission                    Anesthesia Plan    ASA 4     general   (Video Laryngoscope, SUX)  intravenous induction     Anesthetic plan, all risks, benefits, and alternatives have been provided, discussed and informed consent has been obtained with: patient.

## 2020-10-01 ENCOUNTER — READMISSION MANAGEMENT (OUTPATIENT)
Dept: CALL CENTER | Facility: HOSPITAL | Age: 30
End: 2020-10-01

## 2020-10-01 VITALS
HEART RATE: 60 BPM | DIASTOLIC BLOOD PRESSURE: 60 MMHG | SYSTOLIC BLOOD PRESSURE: 120 MMHG | RESPIRATION RATE: 18 BRPM | OXYGEN SATURATION: 93 % | HEIGHT: 64 IN | TEMPERATURE: 98.6 F | WEIGHT: 293 LBS | BODY MASS INDEX: 50.02 KG/M2

## 2020-10-01 LAB
ALBUMIN SERPL-MCNC: 3.7 G/DL (ref 3.5–5.2)
ALBUMIN/GLOB SERPL: 1.2 G/DL
ALP SERPL-CCNC: 84 U/L (ref 39–117)
ALT SERPL W P-5'-P-CCNC: 24 U/L (ref 1–33)
ANION GAP SERPL CALCULATED.3IONS-SCNC: 11 MMOL/L (ref 5–15)
AST SERPL-CCNC: 23 U/L (ref 1–32)
BASOPHILS # BLD AUTO: 0.1 10*3/MM3 (ref 0–0.2)
BASOPHILS NFR BLD AUTO: 1.2 % (ref 0–1.5)
BILIRUB SERPL-MCNC: 0.3 MG/DL (ref 0–1.2)
BUN SERPL-MCNC: 5 MG/DL (ref 6–20)
BUN SERPL-MCNC: ABNORMAL MG/DL
BUN/CREAT SERPL: ABNORMAL
CALCIUM SPEC-SCNC: 9 MG/DL (ref 8.6–10.5)
CHLORIDE SERPL-SCNC: 105 MMOL/L (ref 98–107)
CO2 SERPL-SCNC: 26 MMOL/L (ref 22–29)
CREAT SERPL-MCNC: 0.76 MG/DL (ref 0.57–1)
DEPRECATED RDW RBC AUTO: 45.9 FL (ref 37–54)
EOSINOPHIL # BLD AUTO: 0 10*3/MM3 (ref 0–0.4)
EOSINOPHIL NFR BLD AUTO: 0.2 % (ref 0.3–6.2)
ERYTHROCYTE [DISTWIDTH] IN BLOOD BY AUTOMATED COUNT: 15.9 % (ref 12.3–15.4)
GFR SERPL CREATININE-BSD FRML MDRD: 91 ML/MIN/1.73
GLOBULIN UR ELPH-MCNC: 3.2 GM/DL
GLUCOSE SERPL-MCNC: 138 MG/DL (ref 65–99)
HCT VFR BLD AUTO: 38 % (ref 34–46.6)
HGB BLD-MCNC: 12.2 G/DL (ref 12–15.9)
LYMPHOCYTES # BLD AUTO: 0.4 10*3/MM3 (ref 0.7–3.1)
LYMPHOCYTES NFR BLD AUTO: 3.3 % (ref 19.6–45.3)
MAGNESIUM SERPL-MCNC: 2.2 MG/DL (ref 1.6–2.6)
MCH RBC QN AUTO: 26.1 PG (ref 26.6–33)
MCHC RBC AUTO-ENTMCNC: 32 G/DL (ref 31.5–35.7)
MCV RBC AUTO: 81.6 FL (ref 79–97)
MONOCYTES # BLD AUTO: 0.2 10*3/MM3 (ref 0.1–0.9)
MONOCYTES NFR BLD AUTO: 1.9 % (ref 5–12)
NEUTROPHILS NFR BLD AUTO: 10.4 10*3/MM3 (ref 1.7–7)
NEUTROPHILS NFR BLD AUTO: 93.4 % (ref 42.7–76)
NRBC BLD AUTO-RTO: 0.2 /100 WBC (ref 0–0.2)
PHOSPHATE SERPL-MCNC: 3 MG/DL (ref 2.5–4.5)
PLATELET # BLD AUTO: 381 10*3/MM3 (ref 140–450)
PMV BLD AUTO: 6.4 FL (ref 6–12)
POTASSIUM SERPL-SCNC: 4.3 MMOL/L (ref 3.5–5.2)
PROT SERPL-MCNC: 6.9 G/DL (ref 6–8.5)
RBC # BLD AUTO: 4.66 10*6/MM3 (ref 3.77–5.28)
SODIUM SERPL-SCNC: 142 MMOL/L (ref 136–145)
WBC # BLD AUTO: 11.1 10*3/MM3 (ref 3.4–10.8)

## 2020-10-01 PROCEDURE — 25010000002 THIAMINE PER 100 MG: Performed by: SURGERY

## 2020-10-01 PROCEDURE — 25010000002 CYANOCOBALAMIN PER 1000 MCG: Performed by: SURGERY

## 2020-10-01 PROCEDURE — 84100 ASSAY OF PHOSPHORUS: CPT | Performed by: SURGERY

## 2020-10-01 PROCEDURE — 85025 COMPLETE CBC W/AUTO DIFF WBC: CPT | Performed by: SURGERY

## 2020-10-01 PROCEDURE — G0008 ADMIN INFLUENZA VIRUS VAC: HCPCS | Performed by: SURGERY

## 2020-10-01 PROCEDURE — 94799 UNLISTED PULMONARY SVC/PX: CPT

## 2020-10-01 PROCEDURE — 80053 COMPREHEN METABOLIC PANEL: CPT | Performed by: SURGERY

## 2020-10-01 PROCEDURE — 25010000002 INFLUENZA VAC SPLIT QUAD 0.5 ML SUSPENSION PREFILLED SYRINGE: Performed by: SURGERY

## 2020-10-01 PROCEDURE — 99024 POSTOP FOLLOW-UP VISIT: CPT | Performed by: NURSE PRACTITIONER

## 2020-10-01 PROCEDURE — 63710000001 PROMETHAZINE PER 12.5 MG: Performed by: SURGERY

## 2020-10-01 PROCEDURE — 25010000002 METOCLOPRAMIDE PER 10 MG: Performed by: SURGERY

## 2020-10-01 PROCEDURE — 83735 ASSAY OF MAGNESIUM: CPT | Performed by: SURGERY

## 2020-10-01 PROCEDURE — 90686 IIV4 VACC NO PRSV 0.5 ML IM: CPT | Performed by: SURGERY

## 2020-10-01 RX ORDER — HYDROCODONE BITARTRATE AND ACETAMINOPHEN 5; 325 MG/1; MG/1
1 TABLET ORAL EVERY 4 HOURS PRN
Qty: 30 TABLET | Refills: 0 | Status: SHIPPED | OUTPATIENT
Start: 2020-10-01 | End: 2020-12-10

## 2020-10-01 RX ORDER — ONDANSETRON 8 MG/1
8 TABLET, ORALLY DISINTEGRATING ORAL EVERY 8 HOURS PRN
Qty: 30 TABLET | Refills: 5 | Status: SHIPPED | OUTPATIENT
Start: 2020-10-01 | End: 2020-11-05

## 2020-10-01 RX ADMIN — ACETAMINOPHEN 1000 MG: 500 TABLET ORAL at 05:55

## 2020-10-01 RX ADMIN — ALBUTEROL SULFATE 2.5 MG: 2.5 SOLUTION RESPIRATORY (INHALATION) at 12:37

## 2020-10-01 RX ADMIN — GABAPENTIN 300 MG: 300 CAPSULE ORAL at 05:55

## 2020-10-01 RX ADMIN — FAMOTIDINE 20 MG: 10 INJECTION, SOLUTION INTRAVENOUS at 08:09

## 2020-10-01 RX ADMIN — ACETAMINOPHEN 1000 MG: 500 TABLET ORAL at 12:00

## 2020-10-01 RX ADMIN — PROMETHAZINE HYDROCHLORIDE 12.5 MG: 12.5 TABLET ORAL at 05:55

## 2020-10-01 RX ADMIN — CYANOCOBALAMIN 1000 MCG: 1000 INJECTION, SOLUTION INTRAMUSCULAR; SUBCUTANEOUS at 08:09

## 2020-10-01 RX ADMIN — METOCLOPRAMIDE 10 MG: 5 INJECTION, SOLUTION INTRAMUSCULAR; INTRAVENOUS at 12:00

## 2020-10-01 RX ADMIN — LEVOTHYROXINE SODIUM 112 MCG: 112 TABLET ORAL at 05:55

## 2020-10-01 RX ADMIN — ALBUTEROL SULFATE 2.5 MG: 2.5 SOLUTION RESPIRATORY (INHALATION) at 07:52

## 2020-10-01 RX ADMIN — INFLUENZA VIRUS VACCINE 0.5 ML: 15; 15; 15; 15 SUSPENSION INTRAMUSCULAR at 14:41

## 2020-10-01 RX ADMIN — METOCLOPRAMIDE 10 MG: 5 INJECTION, SOLUTION INTRAMUSCULAR; INTRAVENOUS at 05:55

## 2020-10-01 RX ADMIN — METOPROLOL SUCCINATE 50 MG: 50 TABLET, EXTENDED RELEASE ORAL at 08:09

## 2020-10-01 RX ADMIN — GABAPENTIN 300 MG: 300 CAPSULE ORAL at 14:41

## 2020-10-01 NOTE — DISCHARGE SUMMARY
"Discharge Summary    Patient name: Cindy Thompson    Medical record number: [id number]    Admission date: 9/30/2020  Discharge date:      Attending physician: Dr. Elle Bill    Primary care physician: Dea Delaney APRN    Referring physician: Elle Bill MD  [address],  [place] [address]    Condition on discharge: Stable    Primary Diagnoses:  Morbid obesity with co-morbidities    Operative Procedure: Laparoscopic sleeve gastrectomy     Cindy Thompson  is post op day one status post procedure listed. Patient denies shortness of air and lower extremity pain. Feels better than yesterday. No vomiting this am. Ambulating well and using incentive spirometer.    Pt is also tolerating PO fluids better this afternoon than this morning. Nausea is also improved. Pt states her abdominal pain is minimal and mainly only with movement.         /77   Pulse 79   Temp 98.8 °F (37.1 °C)   Resp 16   Ht 162.6 cm (64\")   Wt (!) 144 kg (317 lb 0.3 oz)   LMP  (LMP Unknown)   SpO2 93%   BMI 54.42 kg/m²     General:  alert, appears stated age and cooperative   Abdomen: soft, bowel sounds active, appropriate tenderness   Incision:   healing well, no drainage, no erythema, no hernia, no seroma, no swelling, no dehiscence, incision well approximated   Heart: Regular rate   Lungs: Clear to auscultation bilaterally     I reviewed the patient's new clinical results. Labs and vitals reviewed and stable.     Lab Results (last 24 hours)     Procedure Component Value Units Date/Time    Tissue Pathology Exam [[id number]] Collected: 09/30/20 1351    Specimen: Tissue from Stomach, Greater curvature Updated: 10/01/20 1210    BUN [[id number]]  (Abnormal) Collected: 10/01/20 0334    Specimen: Blood Updated: 10/01/20 0720     BUN 5 mg/dL     Comprehensive Metabolic Panel [[id number]]  (Abnormal) Collected: 10/01/20 0334    Specimen: Blood Updated: 10/01/20 0419     Glucose 138 mg/dL      BUN --     Comment: Testing performed by " Progress Note  Psychotherapy Provided [facility]: Group Therapy provided today  Goals addressed in session:   6 D: PT  was seen for Group Therapy session  Pts completed EDQOL screening and results were scored and processed amongst group members  A: Elizabeth spoke with the group about her perception of this screening tool and its inability to accurately categorize some of this group's struggles  This was then discussed  P: Pt will continue to attend individual therapy weekly and the Adult ED group on a biweekly basis  Pain Scale and Suicide Risk [facility]: On a scale of 0 to 10, the patient rates current pain at 4   Current suicide risk is low   Behavioral Health Treatment Plan Nissa Bolus: Diagnosis and Treatment Plan explained to patient, patient relates understanding diagnosis and is agreeable to Treatment Plan  Assessment    1   Other eating disorders (158 52) (I27 0)    Signatures   Electronically signed by : Jeet Lay LCSW; Sep 16 2016 12:56PM EST                       (Author) alternate method        Creatinine 0.76 mg/dL      Sodium 142 mmol/L      Potassium 4.3 mmol/L      Chloride 105 mmol/L      CO2 26.0 mmol/L      Calcium 9.0 mg/dL      Total Protein 6.9 g/dL      Albumin 3.70 g/dL      ALT (SGPT) 24 U/L      AST (SGOT) 23 U/L      Alkaline Phosphatase 84 U/L      Total Bilirubin 0.3 mg/dL      eGFR Non African Amer 91 mL/min/1.73      Globulin 3.2 gm/dL      A/G Ratio 1.2 g/dL      BUN/Creatinine Ratio --     Comment: Testing not performed        Anion Gap 11.0 mmol/L     Narrative:      GFR Normal >60  Chronic Kidney Disease <60  Kidney Failure <15      Phosphorus [[id number]]  (Normal) Collected: 10/01/20 0334    Specimen: Blood Updated: 10/01/20 0419     Phosphorus 3.0 mg/dL     Magnesium [[id number]]  (Normal) Collected: 10/01/20 0334    Specimen: Blood Updated: 10/01/20 0419     Magnesium 2.2 mg/dL     CBC & Differential [[id number]]  (Abnormal) Collected: 10/01/20 0334    Specimen: Blood Updated: 10/01/20 0403    Narrative:      The following orders were created for panel order CBC & Differential.  Procedure                               Abnormality         Status                     ---------                               -----------         ------                     CBC Auto Differential[[id number]]        Abnormal            Final result                 Please view results for these tests on the individual orders.    CBC Auto Differential [[id number]]  (Abnormal) Collected: 10/01/20 0334    Specimen: Blood Updated: 10/01/20 0403     WBC 11.10 10*3/mm3      RBC 4.66 10*6/mm3      Hemoglobin 12.2 g/dL      Hematocrit 38.0 %      MCV 81.6 fL      MCH 26.1 pg      MCHC 32.0 g/dL      RDW 15.9 %      RDW-SD 45.9 fl      MPV 6.4 fL      Platelets 381 10*3/mm3      Neutrophil % 93.4 %      Lymphocyte % 3.3 %      Monocyte % 1.9 %      Eosinophil % 0.2 %      Basophil % 1.2 %      Neutrophils, Absolute 10.40 10*3/mm3      Lymphocytes, Absolute 0.40 10*3/mm3      Monocytes, Absolute  0.20 10*3/mm3      Eosinophils, Absolute 0.00 10*3/mm3      Basophils, Absolute 0.10 10*3/mm3      nRBC 0.2 /100 WBC              Assessment:      Doing well postoperatively.  Pt is tolerating PO fluids better this afternoon than earlier this morning. Pt's nausea is also improved since this morning. Pt is not having any vomiting and her abdominal pain is minimal and mainly only with movement. Pt has also been walking every 2 hours. Will plan to discharge home today with follow up next week.      Plan:   1. Continue Stage 1 diet  2. Continue with ambulation and Incentive spirometry  3. Plan for d/c home  4. Signs and symptoms of a leak reviewed with patient.   5. Prescription for Norco 325 and Zofran prescribed today.       Hospital Course: The patient is a very pleasant 30 y.o. female that was admitted to the hospital with with morbid obesity underwent a laparoscopic gastric sleeve.  The next day the patient was able to tolerate liquids and was ambulating and vital signs and labs were stable.  The patient is discharged home with follow-up in my office next week.      Discharge medications:      Discharge Medications      New Medications      Instructions Start Date   HYDROcodone-acetaminophen 5-325 MG per tablet  Commonly known as: NORCO   1 tablet, Oral, Every 4 Hours PRN      ondansetron ODT 8 MG disintegrating tablet  Commonly known as: Zofran ODT   8 mg, Translingual, Every 8 Hours PRN         Continue These Medications      Instructions Start Date   acetaminophen 650 MG 8 hr tablet  Commonly known as: TYLENOL   650 mg      enoxaparin 40 MG/0.4ML solution syringe  Commonly known as: Lovenox   40 mg, Subcutaneous, Every 12 Hours Scheduled      levothyroxine 112 MCG tablet  Commonly known as: Synthroid   112 mcg, Oral, Daily      metoprolol succinate XL 50 MG 24 hr tablet  Commonly known as: TOPROL-XL   50 mg, Oral, Daily      mupirocin 2 % ointment  Commonly known as: Bactroban   Topical, 2 Times Daily, Apply to  each nostril      olopatadine 0.1 % ophthalmic solution  Commonly known as: Pataday   1 drop, Ophthalmic, 2 Times Daily      sulfamethoxazole-trimethoprim 800-160 MG per tablet  Commonly known as: Bactrim DS   160 mg, Oral, 2 Times Daily      traMADol 50 MG tablet  Commonly known as: ULTRAM   50 mg, Oral, Every 12 Hours PRN, for pain      ursodiol 250 MG tablet  Commonly known as: ACTIGALL   250 mg, Oral, 2 times daily         Stop These Medications    ibuprofen 600 MG tablet  Commonly known as: ADVILMOTRIN            Discharge instructions:  Per Bariatric manual; per our protocol      Follow-up appointment: Follow up with Dr. Bill in the office as scheduled.  If not already scheduled call for appointment at [phone]

## 2020-10-01 NOTE — PROGRESS NOTES
"Subjective:       Cindy Thompson  is post op day one status post procedure listed. Patient denies shortness of air and lower extremity pain. Feels better than yesterday. No vomiting this am. Ambulating well and using incentive spirometer.   Pt is having some minor nausea this morning but no vomiting. Pt states she has only drank about 1/3 of a small gateraid and a few sips of water.      Objective:        /77   Pulse 68   Temp 98.8 °F (37.1 °C)   Resp 15   Ht 162.6 cm (64\")   Wt (!) 144 kg (317 lb 0.3 oz)   LMP  (LMP Unknown)   SpO2 94%   BMI 54.42 kg/m²     General:  alert, appears stated age and cooperative   Abdomen: soft, bowel sounds active, appropriate tenderness   Incision:   healing well, no drainage, no erythema, no hernia, no seroma, no swelling, no dehiscence, incision well approximated   Heart: Regular rate   Lungs: Clear to auscultation bilaterally     I reviewed the patient's new clinical results. Labs and vitals reviewed and stable     Lab Results (last 24 hours)     Procedure Component Value Units Date/Time    BUN [[id number]]  (Abnormal) Collected: 10/01/20 0334    Specimen: Blood Updated: 10/01/20 0720     BUN 5 mg/dL     Comprehensive Metabolic Panel [[id number]]  (Abnormal) Collected: 10/01/20 0334    Specimen: Blood Updated: 10/01/20 0419     Glucose 138 mg/dL      BUN --     Comment: Testing performed by alternate method        Creatinine 0.76 mg/dL      Sodium 142 mmol/L      Potassium 4.3 mmol/L      Chloride 105 mmol/L      CO2 26.0 mmol/L      Calcium 9.0 mg/dL      Total Protein 6.9 g/dL      Albumin 3.70 g/dL      ALT (SGPT) 24 U/L      AST (SGOT) 23 U/L      Alkaline Phosphatase 84 U/L      Total Bilirubin 0.3 mg/dL      eGFR Non African Amer 91 mL/min/1.73      Globulin 3.2 gm/dL      A/G Ratio 1.2 g/dL      BUN/Creatinine Ratio --     Comment: Testing not performed        Anion Gap 11.0 mmol/L     Narrative:      GFR Normal >60  Chronic Kidney Disease <60  Kidney Failure <15   "    Phosphorus [[id number]]  (Normal) Collected: 10/01/20 0334    Specimen: Blood Updated: 10/01/20 0419     Phosphorus 3.0 mg/dL     Magnesium [[id number]]  (Normal) Collected: 10/01/20 0334    Specimen: Blood Updated: 10/01/20 0419     Magnesium 2.2 mg/dL     CBC & Differential [[id number]]  (Abnormal) Collected: 10/01/20 0334    Specimen: Blood Updated: 10/01/20 0403    Narrative:      The following orders were created for panel order CBC & Differential.  Procedure                               Abnormality         Status                     ---------                               -----------         ------                     CBC Auto Differential[[id number]]        Abnormal            Final result                 Please view results for these tests on the individual orders.    CBC Auto Differential [[id number]]  (Abnormal) Collected: 10/01/20 0334    Specimen: Blood Updated: 10/01/20 0403     WBC 11.10 10*3/mm3      RBC 4.66 10*6/mm3      Hemoglobin 12.2 g/dL      Hematocrit 38.0 %      MCV 81.6 fL      MCH 26.1 pg      MCHC 32.0 g/dL      RDW 15.9 %      RDW-SD 45.9 fl      MPV 6.4 fL      Platelets 381 10*3/mm3      Neutrophil % 93.4 %      Lymphocyte % 3.3 %      Monocyte % 1.9 %      Eosinophil % 0.2 %      Basophil % 1.2 %      Neutrophils, Absolute 10.40 10*3/mm3      Lymphocytes, Absolute 0.40 10*3/mm3      Monocytes, Absolute 0.20 10*3/mm3      Eosinophils, Absolute 0.00 10*3/mm3      Basophils, Absolute 0.10 10*3/mm3      nRBC 0.2 /100 WBC     Pregnancy, Urine - Urine, Clean Catch [[id number]]  (Normal) Collected: 09/30/20 1033    Specimen: Urine, Clean Catch Updated: 09/30/20 1048     HCG, Urine QL Negative             Assessment:      Doing well postoperatively.  Pt is having some minor nausea without vomiting. Pt is also having some mild abdominal pain which is worse with movement. Pt has only drank 1/3 of a small gateraid and a few sips of water since yesterday. The patient is walking around every 2 hours  however, pt states she has been reluctant to walk out of fear of pain. I have discussed with the patient the importance of taking small sips of water and staying hydrated and about the importance of walking short distances. Will keep pt this morning and reevaluate later today. If pt's nausea is better and she is able to tolerate PO fluids better by this afternoon will plan to discharge then.      Plan:   1. Continue clear liquid diet  2. Continue with and encourage ambulation and Incentive spirometry  3. Plan for d/c home later today if tolerating diet/ drinking more fluids, and pt's nausea is better  4. Lovenox BID will be continued at home      Patient was seen and examined by FATOUMATA Jovel  [facility]ariat[facility]

## 2020-10-01 NOTE — PROGRESS NOTES
Discharge Planning Assessment   [place]     Patient Name: Cindy Thompson  MRN: [id number]  Today's Date: 10/1/2020    Admit Date: 9/30/2020    Discharge Needs Assessment     Row Name 10/01/20 1324       Living Environment    Lives With  spouse    Current Living Arrangements  home/apartment/condo    Primary Care Provided by  self    Provides Primary Care For  no one    Family Caregiver if Needed  spouse    Quality of Family Relationships  supportive;helpful    Able to Return to Prior Arrangements  yes       Resource/Environmental Concerns    Resource/Environmental Concerns  none    Transportation Concerns  car, none       Transition Planning    Patient/Family Anticipates Transition to  home    Patient/Family Anticipated Services at Transition  none    Transportation Anticipated  family or friend will provide       Discharge Needs Assessment    Readmission Within the Last 30 Days  no previous admission in last 30 days    Equipment Currently Used at Home  bipap;walker, rolling;cane, straight    Concerns to be Addressed  no discharge needs identified    Anticipated Changes Related to Illness  none    Equipment Needed After Discharge  none    Provided Post Acute Provider List?  N/A    Provided Post Acute Provider Quality & Resource List?  N/A    Discharge Coordination/Progress  Anticipate routine discharge home with spouse. Patient denies any needs at this time.        Discharge Plan     Row Name 10/01/20 1329       Plan    Plan  Anticipate routine discharge home with spouse. Patient denies any needs at this time.    Patient/Family in Agreement with Plan  yes    Plan Comments  Spoke with patient from greater than 6 feet away with proper PPE in place. Patient verified her PCP, pharmacy and current DME usage at home. Patient denies any problems obtaining food or meds at this time. Anticipate discharge today or tomorrow.        Continued Care and Services - Admitted Since 9/30/2020    Coordination has not been started for this  encounter.       Expected Discharge Date and Time     Expected Discharge Date Expected Discharge Time    Oct 1, 2020         Demographic Summary     Row Name 10/01/20 1323       General Information    Admission Type  inpatient    Arrived From  home    Required Notices Provided  Important Message from Medicare    Referral Source  admission list    Preferred Language  English     Used During This Interaction  no       Contact Information    Permission Granted to Share Info With            Patient Forms     Row Name 10/01/20 1332       Patient Forms    Important Message from Medicare ([facility])  Delivered Patient received on 9/30            Anna [place] [address]   [phone]  office  [phone]  fax  [email or web address]

## 2020-10-01 NOTE — PROGRESS NOTES
Continued Stay Note  BOOGIE Avila     Patient Name: Cindy Thompson  MRN: [id number]  Today's Date: 10/1/2020    Admit Date: 9/30/2020    Discharge Plan     Row Name 10/01/20 2205       Plan    Plan Comments  SW met with pt at bedside to discuss [facility] (wearing appropriate PPE). Pt is agreeable referral and would like to know what services she may be eligible for.    Row Name 10/01/20 1328             Expected Discharge Date and Time     Expected Discharge Date Expected Discharge Time    Oct 1, 2020           JONATHAN Webb    Phone # [phone]  Cell #[phone]  Fax#[phone]  [email or web address]      JONATHAN Webb

## 2020-10-01 NOTE — DISCHARGE INSTRUCTIONS
GOING HOME AFTER GASTRIC SLEEVE/ GASTRIC BYPASS SURGERY  [facility] Weight Loss: Post-Operative Information/Instructions  Elle Bill MD  General Patient Instructions for Discharge   - Call Surgeon's office at [phone] for follow-up appointment.    - Be sure you, the patient, have a follow-up appointment to be seen within seven (7) days after discharge. If not, please call [phone] to schedule an appointment. If you are discharged on a Saturday or Sunday, please call Monday to schedule the appointment.  - Contact the Surgeon at [phone] for any questions or concerns, including temperature greater than or equal to 101F, shortness of breath, leg swelling, redness at incision sites, nausea, vomiting, chills, or problems or questions.    - Follow the Gastric Stage 1 Diet    à Clear liquids, room temperature, sugar-free, caffeine-free, non-carbonated, 70 grams of protein, No Straws.  - You may shower. No tub bath for 2 weeks.  - No lifting, pushing, pulling, or tugging >25 pounds for 3 weeks.  - Ambulate every 3 hours while awake minimum for seven (7) days, increase distance daily.  - For the next several weeks, you are at an increased risk for blood clot formation. Therefore, you should walk regularly. You should not sit for prolonged periods of time, more than 45 minutes, without getting up and walking for 5-10 minutes. This includes any car rides, including the drive home from the hospital. If driving any distance greater than 30 miles over the next two (2) weeks, stop every 30-45 minutes and walk for 5-10 minutes each time.  - Continue using Incentive Spirometer and coughing exercises at least every two (2) hours while awake for one week.  - Continue use of CPAP/BIPAP for diagnosis of sleep apnea as directed.  - No driving or operating machinery allowed while taking narcotic (prescription) pain medication, and until you feel comfortable forcefully applying the brakes if needed. (This usually  takes more than 3 days.)    - Make an appointment with your Primary Care Physician within one week post-op to look at your home medications for possible changes or discontinuity.   Medications  - The nurse will provide a list of medications for you to continue at home   - If you received a Lovenox (Enoxaparin) or Apixiban (Eliquis) prescription at pre-op visit with Surgeon, start taking the medicine the morning after discharge unless directed otherwise.    - If you were prescribed Lovenox (Enoxaparin), review the education/teaching material/video with the nurse.    - Take post op pain meds as prescribed as needed.   - Continue Foltx until finished.   - Resume use of Actigall (Ursodiol) one (1) week after surgery if patient still has gallbladder. You should have been given a prescription at your pre-op visit. Contact the office if you do not have the prescription.   - Resume bariatric vitamin regimen as instructed in pre-op education with bariatric coordinator.    - Zegerid or Prilosec OTC (or generic) by mouth once daily for four (4) weeks unless you are already taking a proton pump inhibitor as home medication. Follow dosing instructions on package.   Nausea/Vomiting:  The following are possible causes for nausea/vomiting:  - Drinking too much or too fast.  - Sinus drainage/post nasal drip for allergy sufferers (you may take Sudafed, Claritin, Tylenol Sinus/Allergy, or other decongestants and nose sprays to help with this discomfort).  - Low blood sugar (sweating, shaky, irritable, weakness, dizzy or tunnel-vision) - treatment is to sip 100% fruit juice - no sugar added until symptoms subside.  - Acid in fruit juice - (may dilute with water or avoid).  - Eating or drinking something that is not on clear liquid (stage 1) diet.  Any nausea/vomiting that prohibits you from keeping fluids down for greater than 24 hours requires a call to the surgeon's office.  Urine:  Use your urine color as a guide to determine if you  are drinking enough fluid. The darker the urine, the more fluids you need to drink. Urine should be clear to light yellow if you are getting enough fluid. If you should experience frequency, burning or pain with urination, blood in urine, contact us or your primary care physician for possible UTI (urinary tract infection), which could require antibiotics (liquid preferred).  Bowel Movements:  You may not have a bowel movement for 2-5 days after going home. You may then experience liquid, runny or loose stools for approximately 3-4 weeks following surgery. This would require you to drink even more fluids to prevent dehydration. Some patients may experience constipation, which can be treated with increased fluids, drinking warm liquids, increased activity and the use of a Fleets Enema, Milk of Magnesia, or suppositories. The first couple of bowel movements could be bloody, tarry black or dark maroon in color. This is OK as long as the stool returns to a normal color in 1-2 days. If however, you have frequent or a large amount of bloody or tarry black stools and/or become light-headed or dizzy, you may be bleeding and require urgent attention. Please call us right away.  Abdominal Incisions:  You will have small incisions. Do not scrub incisions, but allow the warm, soapy water to run over the incisions, rinse well, and pat dry. You may use any brand of anti-bacterial soap. Do not use Peroxide or Neosporin type ointments on sites, unless instructed to do so by a surgeon or nurse. Monitor daily for signs/symptoms of infection, which might include: drainage with a foul odor, pain, redness, swelling or heat at the incision sight; fever, body aches and chills. If you suspect infection or have a fever, give us a call.  Pain:  You will be given a prescription for pain medication to control your pain. If you feel the dose is too strong, you may take half the ordered dose, or you may take Tylenol adult liquid per package  instructions for minor pain. Do not take any medications that contain aspirin or aspirin products.  Do not take medications like: Motrin, Aleve, Ibuprofen, Advil, Naproxen, Celebrex, Daypro, Bextra, Meloxicam or other medications commonly used for arthritis or joint pain.  No steroids or cortisone injections. There may be pain, which should improve every few days. Pain should not suddenly get worse or more intense. Pain that suddenly changes and is constant and severe should be called in to the surgeon's office. Any sudden pain in the lower extremities with associated warmth and redness should be called in to the surgeon's office immediately. Do not rub or massage this area, as it could be a blood clot.  Diet:  Remain on the clear liquid diet (stage 1) per your  which includes 70 grams of protein each day, sugar free, non carbonated and no straws. Day 1 is the day of surgery. If you are tolerating the stage 1 diet, you may then proceed to stage 2 diet, as instructed in the . Do not progress to the stage 2 diet if you are having nausea/vomiting. Refer to the Basic Nutrition and Food Principles guide.  Medications:  The nurse will let you know which medications you will need to continue once you go home. Do not take any medications that are extended or time released if you had the gastric bypass procedure, OK to take if you had the gastric sleeve procedure. Large capsules can be opened and diluted with clear liquids. Check with your physician or pharmacist as to which pills may be crushed and which capsules may be opened and diluted safely. Continue taking Foltx as surgeon orders. If you still have your gall bladder and were prescribed Actigall (Ursodiol), you may resume this medication one week after your surgery. You will remain on Actigall (Ursodiol) for approximately 6 months. The dose is 1 pill, 2 times each day for 6 months.  Activity:  Continue your deep breathing and coughing  exercises with your Incentive Spirometer breathing device at least every 3 hours while awake (10 repetitions each time) for one week. May use CPAP. This will help to prevent respiratory problems such as pneumonia. No lifting, pulling or tugging anything over 25 pounds for 3 weeks after surgery. You may shower but no tub baths, hot tubs or swimming for 2 weeks. Moderate walking is recommended every 3 hours while awake minimum, increase distance daily. Further exercise will be discussed at the first post-op visit. No driving or operating machinery allow until off narcotic pain medication and until you feel comfortable forcefully applying the brakes (usually takes 3 or more days). For the next few weeks you are at an increased risk for blood clot formation. Therefore you should walk regularly and you should not sit for prolonged periods of time, more than 45 minutes without getting up and walking for 5-10 minutes. This includes car rides. Including riding home from the hospital. If riding a distance greater than 30 miles over the next 2 weeks stop every 30-45 minutes and walk 5-10 mintues each time. No tanning bed use for 8 weeks after surgery and in general, not recommended due to the increased risk for skin cancer. Incisions will burn/blister very badly with tanning bed use.  Illness:  Your primary care physician should treat general illness such as ear infections, sinus infections, and viral type illnesses, etc. Medications prescribed should be liquid/elixir form when possible, for the first 30 days.  General:  In general, it is recommended that you weigh yourself no more than once per week. Let the weight come off you and concentrate on more important things. Remember the weight was not gained overnight, nor will it be lost overnight. Gastric Bypass/ Gastric Sleeve weight loss will continue over a period of 12-18 months. Do not  yourself according to how others are doing after surgery, as this will cause  unnecessary discouragement.  THE ABOVE ARE GENERAL GUIDELINES TO ASSIST YOU ONCE HOME, IF YOU ARE IN DOUBT, OR YOU HAVE ANY QUESTIONS, CALL US AT THE NUMBERS LISTED BELOW.  IN THE EVENT OF SUDDEN CHEST PAIN, SHORTNESS OF BREATH, OR ANY LIFE THREATENING CONDITION, CALL 911.  Any time you are evaluated or admitted to another facility, please have someone notify the surgeon's office.  Supplements:  70 grams of protein taken EVERY DAY. Remember to drink at least 64 ounces of fluid a day, sipping slowly early on. Increase this amount during the summertime. Sipping slowly will not stretch your new stomach. Drinking too fast or gulping liquids will cause brief discomfort and early could cause staple line disruption (leak). With eating, tiny bites, then chew, chew, chew, and swallow. Lay your fork/spoon down for 2-3 minutes, and then take your next bite. Your pouch will tell you within 1-2 bites if it is going to tolerate what you are eating.   Protein Vendors:  Refer to protein vendors' handout from consult class. You can always find protein drinks at the bariatric office, grocery stores, [employer], drug [employer], [employer], health food stores, and on the Internet. Find one high in protein (15-30 grams per serving) and low carb (less than 18 grams per serving).  Now is a great time to re-read your . Please review specific instructions given to you at discharge by your physician (surgeon).  HOW/WHEN TO CONTACT US:  It is imperative that you contact us with any of the following:    Ÿ fever greater than 101 degrees  Ÿ shortness of breath  Ÿ leg swelling  Ÿ body aches  Ÿ shaking chills  Ÿ nausea and vomitting  Ÿ pain that has worsened  Ÿ redness at incision sites  Ÿ pus or foul smelling drainage from an incision or wound  Ÿ inability to keep fluids down for more than a day  Ÿ any other condition you feel needs our attention.  [facility] - Bariatric: [phone] call this number anytime 24 hours a day /  7 days a week.  Teach-back Questions to be answered by the patient prior to discharge.   What complications would prompt you to call your doctor when you return home? _________________    What is the purpose of your prescribed medication? ________________  What are some potential side effects of the medications you will be taking at home? _______________

## 2020-10-01 NOTE — OUTREACH NOTE
Prep Survey      Responses   [facility] discharged from?  [place]   Is LACE score < 7 ?  No   Eligibility  [facility]   Hospital  [place]   Date of Admission  09/30/20   Date of Discharge  10/01/20   Discharge Disposition  Home or Self Care   Discharge diagnosis  Gastric sleeve lap   Does the patient have one of the following disease processes/diagnoses(primary or secondary)?  General Surgery   Does the patient have Home health ordered?  No   Is there a DME ordered?  No   Prep survey completed?  Yes          Elvira Veliz RN

## 2020-10-02 ENCOUNTER — TRANSITIONAL CARE MANAGEMENT TELEPHONE ENCOUNTER (OUTPATIENT)
Dept: CALL CENTER | Facility: HOSPITAL | Age: 30
End: 2020-10-02

## 2020-10-02 LAB
LAB AP CASE REPORT: NORMAL
PATH REPORT.FINAL DX SPEC: NORMAL
PATH REPORT.GROSS SPEC: NORMAL

## 2020-10-02 NOTE — OUTREACH NOTE
Call Center TCM Note      Responses   [facility] patient discharged from?  [place]   Does the patient have one of the following disease processes/diagnoses(primary or secondary)?  General Surgery   TCM attempt successful?  Yes   Call start time  1641   Call end time  1650   Discharge diagnosis  Gastric sleeve lap   Meds reviewed with patient/caregiver?  Yes   Is the patient having any side effects they believe may be caused by any medication additions or changes?  No   Does the patient have all medications related to this admission filled (includes all antibiotics, pain medications, etc.)  Yes   Is the patient taking all medications as directed (includes completed medication regime)?  Yes   Does the patient have a follow up appointment scheduled with their surgeon?  Yes   Has the patient kept scheduled appointments due by today?  N/A   Comments  Has a followup on 10/5/2020 with surgeon. Declines PCP appt at this time.    Psychosocial issues?  No   Did the patient receive a copy of their discharge instructions?  Yes   Nursing interventions  Reviewed instructions with patient   What is the patient's perception of their health status since discharge?  Improving   Nursing interventions  Nurse provided patient education   Is the patient /caregiver able to teach back basic post-op care?  Drive as instructed by MD in discharge instructions, Take showers only when approved by MD-sponge bathe until then, No tub bath, swimming, or hot tub until instructed by MD, Keep incision areas clean,dry and protected   Is the patient/caregiver able to teach back signs and symptoms of incisional infection?  Increased redness, swelling or pain at the incisonal site, Increased drainage or bleeding, Incisional warmth, Pus or odor from incision, Fever   Is the patient/caregiver able to teach back steps to recovery at home?  Rest and rebuild strength, gradually increase activity, Set small, achievable goals for return to baseline health,  Make a list of questions for surgeon's appointment   Is the patient/caregiver able to teach back the hierarchy of who to call/visit for symptoms/problems? PCP, Specialist, Home health nurse, Urgent Care, ED, 911  Yes   TCM call completed?  Yes          Singh Sosa RN    10/2/2020, 16:50 EDT

## 2020-10-02 NOTE — PROGRESS NOTES
Case Management Discharge Note      Final Note: Routine discharge home with spouse. Patient denies any needs at this time.    Provided Post Acute Provider List?: N/A  Provided Post Acute Provider Quality & Resource List?: N/A    Selected Continued Care - Discharged on 10/1/2020 Admission date: 9/30/2020 - Discharge disposition: Home or Self Care                 Final Discharge Disposition Code: 01 - home or self-care

## 2020-10-05 ENCOUNTER — OFFICE VISIT (OUTPATIENT)
Dept: BARIATRICS/WEIGHT MGMT | Facility: CLINIC | Age: 30
End: 2020-10-05

## 2020-10-05 VITALS
TEMPERATURE: 98.6 F | WEIGHT: 293 LBS | RESPIRATION RATE: 18 BRPM | HEIGHT: 64 IN | SYSTOLIC BLOOD PRESSURE: 108 MMHG | HEART RATE: 94 BPM | DIASTOLIC BLOOD PRESSURE: 76 MMHG | BODY MASS INDEX: 50.02 KG/M2

## 2020-10-05 PROCEDURE — 99024 POSTOP FOLLOW-UP VISIT: CPT | Performed by: NURSE PRACTITIONER

## 2020-10-06 NOTE — PROGRESS NOTES
[facility] SURG [facility]  [address] [place] [address]  [address] [place] [address]  Dept: [phone]  10/6/2020      Cindy Thompson.  [id number]  [id number]  1990  female      Chief Complaint   Patient presents with   • Follow-up     GS 1 wk       BH Post-Op Bariatric Surgery:     HPI:   Weight loss in the last week:8 pounds     Wt Readings from Last 10 Encounters:   10/05/20 (!) 140 kg (309 lb 6.4 oz)   09/30/20 (!) 144 kg (317 lb 0.3 oz)   09/25/20 (!) 147 kg (323 lb)   09/23/20 (!) 146 kg (321 lb)   09/21/20 (!) 147 kg (323 lb 3.1 oz)   09/15/20 (!) 151 kg (333 lb)   06/30/20 (!) 145 kg (320 lb)   05/26/20 (!) 149 kg (329 lb)   05/21/20 (!) 154 kg (340 lb 6.2 oz)   04/21/20 (!) 155 kg (342 lb 12.8 oz)       Review of Systems   Constitutional: Positive for activity change, appetite change and fatigue.   Respiratory: Negative.    Cardiovascular: Negative.    Gastrointestinal: Positive for abdominal pain.        Minimal abdominal pain mainly with movement    Musculoskeletal: Positive for back pain and myalgias.       Patient Active Problem List   Diagnosis   • Uterine cancer (CMS/HCC)   • Hypertension   • Depression   • Learning disability   • Abdominal pain   • Abnormal urinalysis   • Arthropathy   • B12 deficiency   • Former smoker   • Headache, migraine   • Hypothyroidism   • Microscopic hematuria   • BMI 60.0-69.9, adult (CMS/HCC)   • Obesity   • Oligomenorrhea   • Other general symptoms and signs   • Stage 1 chronic kidney disease   • Tooth disorder   • Sore throat   • Morbid obesity (CMS/HCC)   • Pre-operative exam   • Super-super obese (CMS/HCC)   • Morbid obesity with BMI of 50.0-59.9, adult (CMS/HCC)   • Super obese       The following portions of the patient's history were reviewed and updated as appropriate: allergies, current medications, past family history, past medical history, past social history, past surgical  history and problem list.    Vitals:    10/05/20 1331   BP: 108/76   Pulse: 94   Resp: 18   Temp: 98.6 °F (37 °C)       Physical Exam  Constitutional:       Appearance: Normal appearance. She is obese.   Cardiovascular:      Rate and Rhythm: Normal rate and regular rhythm.      Pulses: Normal pulses.      Heart sounds: Normal heart sounds.   Pulmonary:      Effort: Pulmonary effort is normal.      Breath sounds: Normal breath sounds.   Abdominal:      General: Abdomen is flat. Bowel sounds are normal.      Palpations: Abdomen is soft.   Skin:     General: Skin is warm and dry.   Neurological:      General: No focal deficit present.      Mental Status: She is alert and oriented to person, place, and time.   Psychiatric:         Mood and Affect: Mood normal.         Behavior: Behavior normal.         Thought Content: Thought content normal.         Judgment: Judgment normal.         Assessment:   Post-op, the patient is doing well. Pt is tolerating PO fluids well. Is not having any nausea or vomiting. Minimal abdominal pain which is only with movement. Diet progression reviewed with patient. Plan to follow up at 1 month apt.     Plan:   Reviewed with patient the importance of following the manual for diet progression. Increase activity as tolerated. Continue increasing daily intake of protein and water.   Return to work: the patient is to return to 3 weeks from their surgery date with no restrictions unless they develop medical problems in which we will see them back in the office. They received a note in our office today with their return to work date.  Activity restrictions: no lifting, pushing or pulling over 25lbs for 3 weeks.   Recommended patient be sure to get at least 70 grams of protein per day. Discussed with the patient the recommended amount of water per day to intake. Reviewed vitamin requirements. Be sure to do routine exercise and increase activity as tolerated. No asa, nsaids or steroids for 8 weeks.  Patient may use miralax as needed if necessary.     Instructions / Recommendations: dietary counseling recommended, recommended a daily protein intake of  grams, vitamin supplement(s) recommended, recommended exercising at least 150 minutes per week, behavior modifications recommended and instructed to call the office for concerns, questions, or problems.     The patient was instructed to follow up at one month follow up appt.     The patient was counseled regarding post op bariatric manual  Total time spent with patient 25 minutes.     FATOUMATA Jovel  [facility] Bariatrics and General Surgery

## 2020-10-07 ENCOUNTER — TELEPHONE (OUTPATIENT)
Dept: BARIATRICS/WEIGHT MGMT | Facility: CLINIC | Age: 30
End: 2020-10-07

## 2020-10-07 NOTE — TELEPHONE ENCOUNTER
Patient called 10/6 complaining of pain with eating food or drinking except water.  I asked her to come into office and she called back and stated that she was feeling better.    Attempted call to patient today 10/7 to check on her and no answer and no option for VM.

## 2020-10-12 ENCOUNTER — READMISSION MANAGEMENT (OUTPATIENT)
Dept: CALL CENTER | Facility: HOSPITAL | Age: 30
End: 2020-10-12

## 2020-10-12 NOTE — OUTREACH NOTE
General Surgery Week 2 Survey      Responses   [facility] patient discharged from?  [place]   Does the patient have one of the following disease processes/diagnoses(primary or secondary)?  General Surgery   Week 2 attempt successful?  Yes   Call start time  1706   Call end time  1712   Discharge diagnosis  Gastric sleeve lap   Meds reviewed with patient/caregiver?  Yes   Is the patient having any side effects they believe may be caused by any medication additions or changes?  No   Does the patient have all medications related to this admission filled (includes all antibiotics, pain medications, etc.)  Yes   Is the patient taking all medications as directed (includes completed medication regime)?  Yes   Does the patient have a follow up appointment scheduled with their surgeon?  Yes   Has the patient kept scheduled appointments due by today?  Yes   Has home health visited the patient within 72 hours of discharge?  N/A   Psychosocial issues?  No   Did the patient receive a copy of their discharge instructions?  Yes   Nursing interventions  Reviewed instructions with patient   What is the patient's perception of their health status since discharge?  Improving   Nursing interventions  Nurse provided patient education   Is the patient /caregiver able to teach back basic post-op care?  Take showers only when approved by MD-sponge bathe until then, No tub bath, swimming, or hot tub until instructed by MD, Keep incision areas clean,dry and protected, Lifting as instructed by MD in discharge instructions   Is the patient/caregiver able to teach back signs and symptoms of incisional infection?  Increased redness, swelling or pain at the incisonal site, Increased drainage or bleeding, Incisional warmth, Pus or odor from incision, Fever   Is the patient/caregiver able to teach back steps to recovery at home?  Set small, achievable goals for return to baseline health, Rest and rebuild strength, gradually increase activity    Is the patient/caregiver able to teach back the hierarchy of who to call/visit for symptoms/problems? PCP, Specialist, Home health nurse, Urgent Care, ED, 911  Yes   Additional teach back comments  states has severe gas after eating certain foods, causing pain and pressure up to chest, advised pt to discuss pain with mD and see if meds will alleviate gas pain, pt agreed   Week 2 call completed?  Yes          Jessica Gore RN

## 2020-10-21 ENCOUNTER — READMISSION MANAGEMENT (OUTPATIENT)
Dept: CALL CENTER | Facility: HOSPITAL | Age: 30
End: 2020-10-21

## 2020-10-21 NOTE — OUTREACH NOTE
General Surgery Week 3 Survey      Responses   [facility] [place] patient discharged from?  [place]   Does the patient have one of the following disease processes/diagnoses(primary or secondary)?  General Surgery   Week 3 attempt successful?  No   Unsuccessful attempts  Attempt 1          Syed Christopher RN

## 2020-11-05 ENCOUNTER — OFFICE VISIT (OUTPATIENT)
Dept: BARIATRICS/WEIGHT MGMT | Facility: CLINIC | Age: 30
End: 2020-11-05

## 2020-11-05 VITALS
HEIGHT: 64 IN | HEART RATE: 78 BPM | RESPIRATION RATE: 16 BRPM | BODY MASS INDEX: 50.02 KG/M2 | SYSTOLIC BLOOD PRESSURE: 123 MMHG | DIASTOLIC BLOOD PRESSURE: 84 MMHG | WEIGHT: 293 LBS | OXYGEN SATURATION: 97 %

## 2020-11-05 DIAGNOSIS — E64.0 SEQUELAE OF PROTEIN-CALORIE MALNUTRITION (HCC): ICD-10-CM

## 2020-11-05 DIAGNOSIS — Z22.322 MRSA CARRIER: ICD-10-CM

## 2020-11-05 PROCEDURE — 99024 POSTOP FOLLOW-UP VISIT: CPT | Performed by: NURSE PRACTITIONER

## 2020-11-05 RX ORDER — ONDANSETRON HYDROCHLORIDE 8 MG/1
8 TABLET, FILM COATED ORAL EVERY 8 HOURS PRN
Qty: 30 TABLET | Refills: 5 | Status: SHIPPED | OUTPATIENT
Start: 2020-11-05 | End: 2021-05-28

## 2020-11-05 RX ORDER — POLYETHYLENE GLYCOL 3350 17 G/17G
17 POWDER, FOR SOLUTION ORAL DAILY
Qty: 850 G | Refills: 2 | Status: SHIPPED | OUTPATIENT
Start: 2020-11-05 | End: 2020-12-10

## 2020-11-05 NOTE — PROGRESS NOTES
MGK BAR SURG [facility] MEDICAL GROUP WEIGHT MANAGEMENT  [address] [place] [address]  [address] [place] [address]  Dept: [phone]  11/5/2020      Cindy Thompson.  [id number]  [id number]  1990  female        BH Post-Op Bariatric Surgery:   Cindy Thompson is status post procedure listed above: 1 month gastric sleeve  HPI:     Wt Readings from Last 10 Encounters:   11/05/20 (!) 138 kg (304 lb 6.4 oz)   10/05/20 (!) 140 kg (309 lb 6.4 oz)   09/30/20 (!) 144 kg (317 lb 0.3 oz)   09/25/20 (!) 147 kg (323 lb)   09/23/20 (!) 146 kg (321 lb)   09/21/20 (!) 147 kg (323 lb 3.1 oz)   09/15/20 (!) 151 kg (333 lb)   06/30/20 (!) 145 kg (320 lb)   05/26/20 (!) 149 kg (329 lb)   05/21/20 (!) 154 kg (340 lb 6.2 oz)        Today's weight is (!) 138 kg (304 lb 6.4 oz) pounds, today's BMI is Body mass index is 52.25 kg/m².,@ has a  loss of 5 pounds since the last visit and@ weight loss since surgery is 19 pounds. The patient reports a decreased portion size and loss of appetite.      Cindy Thompson is having nausea - it occurs with liquids and food    no vomiting, GERD      Diet and Exercise: Diet history reviewed and discussed with the patient. Weight loss/gains to date discussed with the patient. The patient states they are eating 40-50 grams of protein per day. She reports eating 2 meals per day, a typical portion size of 2 bites-1/4  cup, eating 1 snacks per day, drinking 6 or more 8-oz. glasses of water per day, no carbonated beverage consumption and exercising regularly.       Breakfast: eggs   Lunch: skip lunch prn  Dinner: mashed potatoes  Snacks: yogurt  Drinks: water and milk and apple juice   Salad - ok  Has tried chicken ok, fish makes her sick at her stomach     Supplements: ensure protein PRN.     Review of Systems   Constitutional: Positive for activity change and appetite change.   Respiratory: Negative.    Cardiovascular: Negative.    Gastrointestinal: Positive for  abdominal pain, constipation and nausea.        Abd pain PRN - crampy feeling - comes and goes- generalized abd pain after eating    Musculoskeletal: Positive for back pain and myalgias.       Patient Active Problem List   Diagnosis   • Uterine cancer (CMS/HCC)   • Hypertension   • Depression   • Learning disability   • Abdominal pain   • Abnormal urinalysis   • Arthropathy   • B12 deficiency   • Former smoker   • Headache, migraine   • Hypothyroidism   • Microscopic hematuria   • BMI 60.0-69.9, adult (CMS/HCC)   • Obesity   • Oligomenorrhea   • Other general symptoms and signs   • Stage 1 chronic kidney disease   • Tooth disorder   • Sore throat   • Morbid obesity (CMS/HCC)   • Pre-operative exam   • Super-super obese (CMS/HCC)   • Morbid obesity with BMI of 50.0-59.9, adult (CMS/HCC)   • Super obese       Past Medical History:   Diagnosis Date   • Anesthesia complication     lips swollen after surgeries, ? mouth gaurd, has trouble with respirations during surgery   • Depression    • Disease of thyroid gland    • Hypertension    • Learning disabilities    • Low back pain    • Obesity    • BEBA (obstructive sleep apnea)     uses CPAP   • Snoring    • Uterine cancer (CMS/HCC)     [facility]- Dr. Eva Belle       The following portions of the patient's history were reviewed and updated as appropriate: allergies, current medications, past family history, past medical history, past social history, past surgical history and problem list.    Vitals:    11/05/20 1452   BP: 123/84   Pulse: 78   Resp: 16   SpO2: 97%       Physical Exam  Constitutional:       Appearance: Normal appearance. She is obese.   Cardiovascular:      Rate and Rhythm: Normal rate and regular rhythm.   Pulmonary:      Effort: Pulmonary effort is normal.      Breath sounds: Normal breath sounds.   Abdominal:      General: Abdomen is flat. Bowel sounds are normal.      Palpations: Abdomen is soft.      Comments: Non tender, non distended    Skin:      General: Skin is warm and dry.   Neurological:      General: No focal deficit present.      Mental Status: She is alert and oriented to person, place, and time.   Psychiatric:         Mood and Affect: Mood normal.         Behavior: Behavior normal.         Thought Content: Thought content normal.         Judgment: Judgment normal.           Assessment:   Post-op, the patient is doing well. Pt is having some PRN abdominal pain and nausea after eating. Pt is having some constipation as well. Pt does have a hx of uterine cancer and is going for a PET scan on 11/6/2020.  Will prescribe Zofran for nausea PRN. Will prescribe Mirilax daily for constipation. Will check 1 month labs today. Will follow up after PET scan and after trying Murelax daily to see if this helps with abdominal pain.     Plan:     Encouraged patient to be sure to get plenty of lean protein per day through small frequent meals all with a protein source.   Activity restrictions: none.   Recommended patient be sure to get at least 70 grams of protein per day by eating small, frequent meals all with high lean protein choices. Be sure to limit/cut back on daily carbohydrate intake. Discussed with the patient the recommended amount of water per day to intake- half of body weight in ounces. Reviewed vitamin requirements. Be sure to do routine exercise, 150 minutes per week minimum, including both cardio and strength training.     Instructions / Recommendations: dietary counseling recommended, recommended a daily protein intake of  grams, vitamin supplement(s) recommended, recommended exercising at least 150 minutes per week, behavior modifications recommended and instructed to call the office for concerns, questions, or problems.     The patient was instructed to follow up in 1 month to reassess abdominal pain and follow up after PET scan.     The patient was counseled regarding diet/ exercise. Ok to exercise again and no lifting restrictions. Ok to  start on a regular solid diet.     Total time spent face to face was 30 minutes and 20  minutes was spent counseling.     Marsha Massey APRERIN  [facility] Bariatrics and General Surgery

## 2020-11-24 ENCOUNTER — OFFICE VISIT (OUTPATIENT)
Dept: FAMILY MEDICINE CLINIC | Facility: CLINIC | Age: 30
End: 2020-11-24

## 2020-11-24 ENCOUNTER — HOSPITAL ENCOUNTER (OUTPATIENT)
Dept: CT IMAGING | Facility: HOSPITAL | Age: 30
Discharge: HOME | End: 2020-11-24

## 2020-11-24 ENCOUNTER — LAB (OUTPATIENT)
Dept: LAB | Facility: HOSPITAL | Age: 30
End: 2020-11-24

## 2020-11-24 VITALS
WEIGHT: 293 LBS | TEMPERATURE: 96.9 F | DIASTOLIC BLOOD PRESSURE: 91 MMHG | SYSTOLIC BLOOD PRESSURE: 123 MMHG | OXYGEN SATURATION: 95 % | BODY MASS INDEX: 50.81 KG/M2 | HEART RATE: 93 BPM

## 2020-11-24 DIAGNOSIS — R10.9 ABDOMINAL PAIN, UNSPECIFIED ABDOMINAL LOCATION: ICD-10-CM

## 2020-11-24 DIAGNOSIS — R10.9 ABDOMINAL PAIN, UNSPECIFIED ABDOMINAL LOCATION: Primary | ICD-10-CM

## 2020-11-24 DIAGNOSIS — Z22.322 MRSA CARRIER: ICD-10-CM

## 2020-11-24 DIAGNOSIS — E64.0 SEQUELAE OF PROTEIN-CALORIE MALNUTRITION (HCC): ICD-10-CM

## 2020-11-24 LAB
ALBUMIN SERPL-MCNC: 4.2 G/DL (ref 3.5–5.2)
ALBUMIN/GLOB SERPL: 1.2 G/DL
ALP SERPL-CCNC: 85 U/L (ref 39–117)
ALT SERPL W P-5'-P-CCNC: 15 U/L (ref 1–33)
ANION GAP SERPL CALCULATED.3IONS-SCNC: 8.2 MMOL/L (ref 5–15)
AST SERPL-CCNC: 16 U/L (ref 1–32)
BASOPHILS # BLD AUTO: 0.05 10*3/MM3 (ref 0–0.2)
BASOPHILS NFR BLD AUTO: 0.6 % (ref 0–1.5)
BILIRUB SERPL-MCNC: 0.4 MG/DL (ref 0–1.2)
BUN SERPL-MCNC: 9 MG/DL (ref 6–20)
BUN/CREAT SERPL: 10.1 (ref 7–25)
CALCIUM SPEC-SCNC: 10 MG/DL (ref 8.6–10.5)
CHLORIDE SERPL-SCNC: 100 MMOL/L (ref 98–107)
CO2 SERPL-SCNC: 33.8 MMOL/L (ref 22–29)
CREAT BLDA-MCNC: 0.9 MG/DL (ref 0.6–1.3)
CREAT SERPL-MCNC: 0.89 MG/DL (ref 0.57–1)
DEPRECATED RDW RBC AUTO: 41.3 FL (ref 37–54)
EOSINOPHIL # BLD AUTO: 0.14 10*3/MM3 (ref 0–0.4)
EOSINOPHIL NFR BLD AUTO: 1.7 % (ref 0.3–6.2)
ERYTHROCYTE [DISTWIDTH] IN BLOOD BY AUTOMATED COUNT: 14.4 % (ref 12.3–15.4)
GFR SERPL CREATININE-BSD FRML MDRD: 76 ML/MIN/1.73
GLOBULIN UR ELPH-MCNC: 3.6 GM/DL
GLUCOSE SERPL-MCNC: 105 MG/DL (ref 65–99)
HCT VFR BLD AUTO: 44.9 % (ref 34–46.6)
HGB BLD-MCNC: 13.9 G/DL (ref 12–15.9)
IMM GRANULOCYTES # BLD AUTO: 0.04 10*3/MM3 (ref 0–0.05)
IMM GRANULOCYTES NFR BLD AUTO: 0.5 % (ref 0–0.5)
IRON 24H UR-MRATE: 41 MCG/DL (ref 37–145)
LYMPHOCYTES # BLD AUTO: 0.77 10*3/MM3 (ref 0.7–3.1)
LYMPHOCYTES NFR BLD AUTO: 9.5 % (ref 19.6–45.3)
MCH RBC QN AUTO: 24.8 PG (ref 26.6–33)
MCHC RBC AUTO-ENTMCNC: 31 G/DL (ref 31.5–35.7)
MCV RBC AUTO: 80.2 FL (ref 79–97)
MONOCYTES # BLD AUTO: 0.43 10*3/MM3 (ref 0.1–0.9)
MONOCYTES NFR BLD AUTO: 5.3 % (ref 5–12)
MRSA DNA SPEC QL NAA+PROBE: ABNORMAL
NEUTROPHILS NFR BLD AUTO: 6.69 10*3/MM3 (ref 1.7–7)
NEUTROPHILS NFR BLD AUTO: 82.4 % (ref 42.7–76)
NRBC BLD AUTO-RTO: 0 /100 WBC (ref 0–0.2)
PLATELET # BLD AUTO: 373 10*3/MM3 (ref 140–450)
PMV BLD AUTO: 8.5 FL (ref 6–12)
POTASSIUM SERPL-SCNC: 3.8 MMOL/L (ref 3.5–5.2)
PREALB SERPL-MCNC: 15.4 MG/DL (ref 20–40)
PROT SERPL-MCNC: 7.8 G/DL (ref 6–8.5)
RBC # BLD AUTO: 5.6 10*6/MM3 (ref 3.77–5.28)
SODIUM SERPL-SCNC: 142 MMOL/L (ref 136–145)
WBC # BLD AUTO: 8.12 10*3/MM3 (ref 3.4–10.8)

## 2020-11-24 PROCEDURE — 84134 ASSAY OF PREALBUMIN: CPT

## 2020-11-24 PROCEDURE — 80053 COMPREHEN METABOLIC PANEL: CPT

## 2020-11-24 PROCEDURE — 99214 OFFICE O/P EST MOD 30 MIN: CPT | Performed by: NURSE PRACTITIONER

## 2020-11-24 PROCEDURE — 84425 ASSAY OF VITAMIN B-1: CPT

## 2020-11-24 PROCEDURE — 83921 ORGANIC ACID SINGLE QUANT: CPT

## 2020-11-24 PROCEDURE — 85025 COMPLETE CBC W/AUTO DIFF WBC: CPT

## 2020-11-24 PROCEDURE — 83540 ASSAY OF IRON: CPT

## 2020-11-24 PROCEDURE — 36415 COLL VENOUS BLD VENIPUNCTURE: CPT

## 2020-11-24 PROCEDURE — 0 IOPAMIDOL PER 1 ML: Performed by: NURSE PRACTITIONER

## 2020-11-24 PROCEDURE — 74177 CT ABD & PELVIS W/CONTRAST: CPT

## 2020-11-24 PROCEDURE — 82565 ASSAY OF CREATININE: CPT

## 2020-11-24 PROCEDURE — 87641 MR-STAPH DNA AMP PROBE: CPT

## 2020-11-24 RX ADMIN — IOPAMIDOL 100 ML: 755 INJECTION, SOLUTION INTRAVENOUS at 11:15

## 2020-11-24 NOTE — PATIENT INSTRUCTIONS
Go to the hospital and have stat CT scan of abdomen  Complete labs  Will notify you with further instructions

## 2020-11-24 NOTE — PROGRESS NOTES
"Subjective   Cindy Thompson is a 30 y.o. female.     Pt is here today with c/o abdominal pain.  Pt reports that the pain started about 2 weeks ago.  It is in her lower abdomen.  She rates the pain a 10/10 and is a constant stabbing sensation.  Has nausea but no vomiting.  Pain worsens when she needs to have a bowel movement.  Last bowel movement was this morning and it was \"hard\".  Denies any blood in stool.  She has been taking miralax with minimal relief.   She reports that she is having vaginal discharge but saw her Gyn on Friday and they gave her pain medication.  She has been getting radiation for uterine cancer.    Last radiation was in August.   She did not have a hysterectomy.  Pt had a gastric sleeve on 9/30/20.  She has tried taking ibuprofen but it didn't help.    Abdominal Pain  This is a recurrent problem. The current episode started 1 to 4 weeks ago. The onset quality is gradual. The problem occurs constantly. The most recent episode lasted 2 weeks. The problem has been gradually worsening. The pain is located in the generalized abdominal region. The pain is at a severity of 10/10. The quality of the pain is cramping. The abdominal pain radiates to the LLQ. Associated symptoms include constipation, frequency and nausea. Pertinent negatives include no anorexia, arthralgias, belching, diarrhea, dysuria, fever, flatus, headaches, hematochezia, hematuria, melena, myalgias, vomiting or weight loss. The pain is aggravated by bowel movement. The pain is relieved by nothing.        The following portions of the patient's history were reviewed and updated as appropriate: allergies, current medications, past family history, past medical history, past social history, past surgical history and problem list.    Review of Systems   Constitutional: Negative for fever and unexpected weight loss.   Respiratory: Negative for chest tightness and shortness of breath.    Cardiovascular: Negative for chest pain and " palpitations.   Gastrointestinal: Positive for abdominal pain, constipation and nausea. Negative for anorexia, diarrhea, flatus, hematochezia, melena and vomiting.   Genitourinary: Positive for frequency. Negative for dysuria and hematuria.   Musculoskeletal: Negative for arthralgias and myalgias.   Neurological: Negative for dizziness and headache.       Objective   /91   Pulse 93   Temp 96.9 °F (36.1 °C) (Tympanic)   Wt 134 kg (296 lb)   SpO2 95%   BMI 50.81 kg/m²   Physical Exam  Vitals signs reviewed.   Constitutional:       General: She is not in acute distress.     Appearance: Normal appearance. She is well-developed. She is not diaphoretic.   HENT:      Head: Normocephalic and atraumatic.   Eyes:      General:         Right eye: No discharge.         Left eye: No discharge.      Extraocular Movements: Extraocular movements intact.      Conjunctiva/sclera: Conjunctivae normal.   Neck:      Musculoskeletal: Normal range of motion and neck supple.   Cardiovascular:      Rate and Rhythm: Normal rate and regular rhythm.      Heart sounds: No murmur.   Pulmonary:      Effort: Pulmonary effort is normal. No respiratory distress.      Breath sounds: Normal breath sounds. No wheezing or rales.   Abdominal:      General: There is no distension.      Palpations: Abdomen is soft.      Tenderness: There is abdominal tenderness (moderate tenderness left abdomen upper and worse in the lower).   Musculoskeletal: Normal range of motion.   Skin:     General: Skin is warm and dry.   Neurological:      Mental Status: She is alert and oriented to person, place, and time.   Psychiatric:         Mood and Affect: Mood normal.         Behavior: Behavior normal.         Thought Content: Thought content normal.         Judgment: Judgment normal.           Assessment/Plan     Diagnoses and all orders for this visit:    1. Abdominal pain, unspecified abdominal location (Primary)  Comments:  unknown etiology  will check CT  scan  possible diverticulitis vs issue with radiation vs constipation  check labs  has pain meds and zofran  Orders:  -     CT Abdomen Pelvis With Contrast; Future  -     Comprehensive Metabolic Panel; Future  -     CBC & Differential

## 2020-11-30 LAB
Lab: NORMAL
METHYLMALONATE SERPL-SCNC: 174 NMOL/L (ref 0–378)
VIT B1 BLD-SCNC: 142.7 NMOL/L (ref 66.5–200)

## 2020-12-03 ENCOUNTER — OFFICE VISIT (OUTPATIENT)
Dept: BARIATRICS/WEIGHT MGMT | Facility: CLINIC | Age: 30
End: 2020-12-03

## 2020-12-03 VITALS — WEIGHT: 280 LBS | HEIGHT: 64 IN | BODY MASS INDEX: 47.8 KG/M2

## 2020-12-03 DIAGNOSIS — E66.01 OBESITY, CLASS III, BMI 40-49.9 (MORBID OBESITY) (HCC): Primary | ICD-10-CM

## 2020-12-03 PROCEDURE — 99024 POSTOP FOLLOW-UP VISIT: CPT | Performed by: NURSE PRACTITIONER

## 2020-12-03 NOTE — PROGRESS NOTES
MGK BAR SURG [facility] MEDICAL GROUP WEIGHT MANAGEMENT  [address] [place] [address]  [address] [place] [address]  Dept: [phone]  12/3/2020      Cindy Thompson.  [id number]  [id number]  1990  female      You have chosen to receive care through a telephone visit. Do you consent to use a telephone visit for your medical care today? Yes      BH Post-Op Bariatric Surgery:   Cindy Thompson is status post procedure listed above  HPI:     Wt Readings from Last 10 Encounters:   12/03/20 127 kg (280 lb)   11/24/20 134 kg (296 lb)   11/05/20 (!) 138 kg (304 lb 6.4 oz)   10/05/20 (!) 140 kg (309 lb 6.4 oz)   09/30/20 (!) 144 kg (317 lb 0.3 oz)   09/25/20 (!) 147 kg (323 lb)   09/23/20 (!) 146 kg (321 lb)   09/21/20 (!) 147 kg (323 lb 3.1 oz)   09/15/20 (!) 151 kg (333 lb)   06/30/20 (!) 145 kg (320 lb)        Today's weight is 127 kg (280 lb) pounds, today's BMI is Body mass index is 48.06 kg/m².,@ has a  loss of 16 pounds since the last visit and@ weight loss since surgery is 37 pounds. The patient reports a decreased portion size and loss of appetite.      Cindy Thompson denies vomiting, gerd, mild nausea- improved since last visit, zofran is helping some     Diet and Exercise: Diet history reviewed and discussed with the patient. Weight loss/gains to date discussed with the patient. The patient states they are eating 40 grams of protein per day. She reports eating 2 meals per day, a typical portion size of 1/4 cup, eating 0 snacks per day, drinking 6 or more 8-oz. glasses of water per day, no carbonated beverage consumption and exercising regularly.     Pt had a PET scan after last visit and was told she was cancer free. Pt also continued to have some abdominal pain after last visit and saw OBGYN and primary care who ordered a CT scan which showed Small fluid is seen within the endometrial canal. Otherwise showed radiation changes from chemo. No hemia or bowel obstruction  noted.     Pt doing well with chicken and softer/ blander foods at this point   Exercising by walking    Supplements: ensure protein PRN.     Review of Systems   Constitutional: Positive for activity change, appetite change and fatigue.   Respiratory: Negative.    Cardiovascular: Negative.    Gastrointestinal: Positive for nausea.   Musculoskeletal: Positive for myalgias.   some constipation reported but better since taking miralax daily     Patient Active Problem List   Diagnosis   • Uterine cancer (CMS/HCC)   • Hypertension   • Depression   • Learning disability   • Abdominal pain   • Abnormal urinalysis   • Arthropathy   • B12 deficiency   • Former smoker   • Headache, migraine   • Hypothyroidism   • Microscopic hematuria   • BMI 60.0-69.9, adult (CMS/HCC)   • Obesity   • Oligomenorrhea   • Other general symptoms and signs   • Stage 1 chronic kidney disease   • Tooth disorder   • Sore throat   • Morbid obesity (CMS/HCC)   • Pre-operative exam   • Super-super obese (CMS/HCC)   • Morbid obesity with BMI of 50.0-59.9, adult (CMS/HCC)   • Super obese       Past Medical History:   Diagnosis Date   • Anesthesia complication     lips swollen after surgeries, ? mouth gaurd, has trouble with respirations during surgery   • Depression    • Disease of thyroid gland    • Hypertension    • Learning disabilities    • Low back pain    • Obesity    • BEBA (obstructive sleep apnea)     uses CPAP   • Snoring    • Uterine cancer (CMS/HCC)     [facility]- Dr. Eva Belle       The following portions of the patient's history were reviewed and updated as appropriate: allergies, current medications, past family history, past medical history, past social history, past surgical history and problem list.    Height 64 inches, weight 280 pounds, BMI 48.06  Physical Exam  Cardiovascular:      Rate and Rhythm: Normal rate and regular rhythm.      Comments: Per patient  Pulmonary:      Effort: Pulmonary effort is normal.      Breath sounds:  Normal breath sounds.      Comments: Per patient  Abdominal:      General: Abdomen is flat. Bowel sounds are normal.      Palpations: Abdomen is soft.      Comments: Per patient   Neurological:      General: No focal deficit present.      Mental Status: She is alert and oriented to person, place, and time.   Psychiatric:         Mood and Affect: Mood normal.         Behavior: Behavior normal.         Thought Content: Thought content normal.         Judgment: Judgment normal.     limited due to telephone call       Assessment:   Post-op, the patient is doing well. Recently had PET scan and was told she is cancer free. Nausea and abdominal pain have improved. Pt take Zofran for PRN nausea which helps. Today pt denied abdominal pain. I reviewed the pt's lab results with pt. Slightly low pre albumin- encouraged increase in protein intake. Pt also retested for MRSA. Pt has a hx of MRSA and no symptoms at this time so will hold off on treated pt as she is probably still a carrier for this and not having any active infections or symptoms. Encourage 60-80 grams protein daily. Encourage light exercise and add resistance training to exercise regiment. Plan to follow up at end of January for 3 month post op visit. Pt encouraged to call office if abdominal pain comes back or she has nausea/vomiting. Ok to take zofran for nausea which was refilled last month.     Plan:     Encouraged patient to be sure to get plenty of lean protein per day through small frequent meals all with a protein source.   Activity restrictions: none.   Recommended patient be sure to get at least 70 grams of protein per day by eating small, frequent meals all with high lean protein choices. Be sure to limit/cut back on daily carbohydrate intake. Discussed with the patient the recommended amount of water per day to intake- half of body weight in ounces. Reviewed vitamin requirements. Be sure to do routine exercise, 150 minutes per week minimum, including  both cardio and strength training.     Instructions / Recommendations: dietary counseling recommended, recommended a daily protein intake of  grams, vitamin supplement(s) recommended, recommended exercising at least 150 minutes per week, behavior modifications recommended and instructed to call the office for concerns, questions, or problems.     The patient was instructed to follow up in 1-2 months for 3 month post op visit.     The patient was counseled regarding. Total time spent face to face was 30 minutes and 25 minutes was spent counseling.     FATOUMATA Jovel  [facility] Bariatrics and General Surgery

## 2020-12-05 ENCOUNTER — HOSPITAL ENCOUNTER (EMERGENCY)
Facility: HOSPITAL | Age: 30
Discharge: HOME | End: 2020-12-05
Admitting: EMERGENCY MEDICINE

## 2020-12-05 VITALS
OXYGEN SATURATION: 98 % | TEMPERATURE: 98.5 F | WEIGHT: 293 LBS | DIASTOLIC BLOOD PRESSURE: 81 MMHG | HEART RATE: 82 BPM | RESPIRATION RATE: 17 BRPM | BODY MASS INDEX: 50.02 KG/M2 | SYSTOLIC BLOOD PRESSURE: 132 MMHG | HEIGHT: 64 IN

## 2020-12-05 DIAGNOSIS — Z20.828 EXPOSURE TO SARS-ASSOCIATED CORONAVIRUS: Primary | ICD-10-CM

## 2020-12-05 PROCEDURE — C9803 HOPD COVID-19 SPEC COLLECT: HCPCS | Performed by: NURSE PRACTITIONER

## 2020-12-05 PROCEDURE — 99157 MOD SED OTHER PHYS/QHP EA: CPT

## 2020-12-05 PROCEDURE — 99283 EMERGENCY DEPT VISIT LOW MDM: CPT

## 2020-12-05 PROCEDURE — U0004 COV-19 TEST NON-CDC HGH THRU: HCPCS | Performed by: NURSE PRACTITIONER

## 2020-12-05 NOTE — ED PROVIDER NOTES
Subjective   30-year-old morbidly obese  female presents to the emergency room with complaint of headache and possible Covid exposure.  Patient denies nausea vomiting diarrhea fever cough congestion and is able to eat and drink as normal.  Patient did state that she has lost her sense of taste and smell.  Onset: Today  Location: Generalized  Duration: A day  Character: Headache  Aggravating/Alleviating Factors: Covid exposure/none  Radiation: None  Severity: Very mild            Review of Systems   Constitutional: Positive for appetite change. Negative for chills, fatigue and fever.   HENT: Negative for congestion, dental problem, drooling, ear discharge, ear pain, facial swelling, hearing loss, mouth sores, nosebleeds, postnasal drip, rhinorrhea, sinus pressure, sinus pain, sneezing, sore throat, tinnitus, trouble swallowing and voice change.    Respiratory: Negative.    Cardiovascular: Negative.    Gastrointestinal: Negative.    Genitourinary: Negative.    Musculoskeletal: Negative.    Skin: Negative.    Neurological: Positive for headaches.   Hematological: Negative.    Psychiatric/Behavioral: Negative.    All other systems reviewed and are negative.      Past Medical History:   Diagnosis Date   • Anesthesia complication     lips swollen after surgeries, ? mouth gaurd, has trouble with respirations during surgery   • Depression    • Disease of thyroid gland    • Hypertension    • Learning disabilities    • Low back pain    • Obesity    • BEBA (obstructive sleep apnea)     uses CPAP   • Snoring    • Uterine cancer (CMS/HCC)     [facility]- Dr. Eva Belle       No Known Allergies    Past Surgical History:   Procedure Laterality Date   • DILATATION AND CURETTAGE     • ENDOSCOPY N/A 2/21/2020    Procedure: ESOPHAGOGASTRODUODENOSCOPY WITH BIOPSY,;  Surgeon: Elle Bill MD;  Location: [facility] ENDOSCOPY;  Service: General;  Laterality: N/A;   • GASTRIC SLEEVE LAPAROSCOPIC N/A 9/30/2020    Procedure: GASTRIC  SLEEVE LAPAROSCOPIC;  Surgeon: Elle Bill MD;  Location: [facility] MAIN OR;  Service: Bariatric;  Laterality: N/A;       Family History   Problem Relation Age of Onset   • Cancer Father    • Hyperlipidemia Father    • Stroke Father    • Arthritis Father         N/A   • Depression Sister         N/A   • Learning disabilities Sister    • Diabetes Maternal Grandmother    • Cancer Maternal Grandmother         N/A   • Heart attack Maternal Grandfather    • Heart disease Maternal Grandfather    • Cancer Paternal Grandmother         Bone Cancer   • Emphysema Paternal Grandfather    • Arthritis Mother         N/A   • Early death Brother         He  in his crib   • Miscarriages / Stillbirths Sister         Miscarriages       Social History     Socioeconomic History   • Marital status:      Spouse name: Not on file   • Number of children: Not on file   • Years of education: Not on file   • Highest education level: Not on file   Tobacco Use   • Smoking status: Former Smoker     Packs/day: 0.00     Years: 0.00     Pack years: 0.00     Types: Cigarettes     Quit date:      Years since quittin.9   • Smokeless tobacco: Never Used   Substance and Sexual Activity   • Alcohol use: No     Frequency: Never     Comment: used to drink weekly   • Drug use: No   • Sexual activity: Defer     Partners: Male     Birth control/protection: None           Objective   Physical Exam  Constitutional:       General: She is not in acute distress.     Appearance: Normal appearance. She is normal weight. She is not ill-appearing, toxic-appearing or diaphoretic.   HENT:      Head: Normocephalic and atraumatic.      Right Ear: Tympanic membrane, ear canal and external ear normal.      Left Ear: Tympanic membrane, ear canal and external ear normal.      Nose: Nose normal. No rhinorrhea.      Mouth/Throat:      Mouth: Mucous membranes are moist.      Pharynx: Oropharynx is clear.   Eyes:      Extraocular Movements: Extraocular movements  intact.      Conjunctiva/sclera: Conjunctivae normal.      Pupils: Pupils are equal, round, and reactive to light.   Neck:      Musculoskeletal: Normal range of motion and neck supple. No neck rigidity or muscular tenderness.   Cardiovascular:      Rate and Rhythm: Normal rate and regular rhythm.      Pulses: Normal pulses.      Heart sounds: Normal heart sounds.   Pulmonary:      Effort: Pulmonary effort is normal.      Breath sounds: Normal breath sounds.   Abdominal:      General: Bowel sounds are normal.      Palpations: Abdomen is soft.   Musculoskeletal: Normal range of motion.   Skin:     General: Skin is warm and dry.      Capillary Refill: Capillary refill takes less than 2 seconds.   Neurological:      General: No focal deficit present.      Mental Status: She is alert and oriented to person, place, and time.   Psychiatric:         Mood and Affect: Mood normal.         Behavior: Behavior normal.         Thought Content: Thought content normal.         Judgment: Judgment normal.         Procedures           ED Course      SEND OUT COVID NASAL SWAB OBTAINED AND SENT TO LAB.  D/C HOME FOR REST AND DRINK PLENTY OF FLUIDS.  ENCOURAGED PATIENT TO RETURN IF WORSE WITH SHORTNESS OF BREATH, COUGH, NOT ABLE TO EAT OR DRINK.                                     MDM    Final diagnoses:   Exposure to SARS-associated coronavirus            Doris Cortes, APRN  12/05/20 1559

## 2020-12-05 NOTE — DISCHARGE INSTRUCTIONS
REST AND DRINK PLENTY OF WATER.  RETURN TO ER FOR WORSENING CONDITION OF SHORTNESS OF BREATH, WORSENING COUGH, LOSING CONSCIOUSNESS, UNABLE TO EAT OR DRINK.

## 2020-12-06 LAB — SARS-COV-2 RNA RESP QL NAA+PROBE: DETECTED

## 2020-12-07 ENCOUNTER — EPISODE CHANGES (OUTPATIENT)
Dept: CASE MANAGEMENT | Facility: OTHER | Age: 30
End: 2020-12-07

## 2020-12-07 ENCOUNTER — PATIENT OUTREACH (OUTPATIENT)
Dept: CASE MANAGEMENT | Facility: OTHER | Age: 30
End: 2020-12-07

## 2020-12-07 NOTE — OUTREACH NOTE
Care Coordination Note    Called pt's PCP office, tele-health follow up appt scheduled for 12/10/20 at 11:00 am. RN-ACM will notify pt.     Eulalio Hernandez RN  Ambulatory     12/7/2020, 13:55 EST

## 2020-12-07 NOTE — OUTREACH NOTE
ED Potential Covid Discharge Follow-up    Pt discharged from [facility] ED on 12/5/20, seen for h/a, loss of taste and smell, covid exposure, covid testing performed, pt covid positive. RN-ACM outreach call made to pt. Pt reports she received positive covid test results via [employer]. Pt c/o h/a, fever- temp 100.9, chills, cough- productive at times, pain with cough, loss of taste and smell. She reports SOA with ambulation, denies CP. Reviewed ED AVS with pt. Education provided. Reviewed quarantine instructions, symptom management, symptoms to monitor, when to seek medical attention. Pt denies transportation or medication insecurities. Pt reports food insecurities, states she needs grocery delivery. Food resources information provided to pt, including discussed having friend or family member deliver food, grocery delivery services, and [employer] resources for food delivery. Pt states she is current with [employer] but she will probably do the grocery delivery service at this time. Discussed PCP follow up. Pt states RN-NINOM can schedule her follow up appt. No other questions or concerns per pt. [facility] 24 hour nurse line and covid hotline numbers provided to pt. RN-NINOM will call to schedule pt's PCP follow up appt, pt asks RN-NINOM to text her the appt information. Follow up outreach as needed.     Eulalio Hernandez RN  Ambulatory     12/7/2020, 12:28 EST

## 2020-12-07 NOTE — OUTREACH NOTE
Care Coordination Note    Called pt's PCP office to schedule pt's follow up appt, no answer. Left voicemail message requesting call back to RN-ACM.     Eulalio Hernandez RN  Ambulatory     12/7/2020, 12:45 EST

## 2020-12-09 ENCOUNTER — APPOINTMENT (OUTPATIENT)
Dept: CT IMAGING | Facility: HOSPITAL | Age: 30
End: 2020-12-09

## 2020-12-09 ENCOUNTER — HOSPITAL ENCOUNTER (OUTPATIENT)
Facility: HOSPITAL | Age: 30
Setting detail: OBSERVATION
Discharge: HOME | End: 2020-12-11
Attending: INTERNAL MEDICINE | Admitting: INTERNAL MEDICINE

## 2020-12-09 DIAGNOSIS — R09.02 HYPOXIA: ICD-10-CM

## 2020-12-09 DIAGNOSIS — R79.89 ELEVATED LFTS: ICD-10-CM

## 2020-12-09 DIAGNOSIS — J12.82 PNEUMONIA DUE TO COVID-19 VIRUS: Primary | ICD-10-CM

## 2020-12-09 DIAGNOSIS — U07.1 PNEUMONIA DUE TO COVID-19 VIRUS: Primary | ICD-10-CM

## 2020-12-09 LAB
ALBUMIN SERPL-MCNC: 3.9 G/DL (ref 3.5–5.2)
ALBUMIN/GLOB SERPL: 1.3 G/DL
ALP SERPL-CCNC: 83 U/L (ref 39–117)
ALT SERPL W P-5'-P-CCNC: 54 U/L (ref 1–33)
ANION GAP SERPL CALCULATED.3IONS-SCNC: 10 MMOL/L (ref 5–15)
ARTERIAL PATENCY WRIST A: POSITIVE
AST SERPL-CCNC: 45 U/L (ref 1–32)
ATMOSPHERIC PRESS: ABNORMAL MM[HG]
BASE EXCESS BLDA CALC-SCNC: 1.6 MMOL/L (ref 0–3)
BASOPHILS # BLD AUTO: 0 10*3/MM3 (ref 0–0.2)
BASOPHILS NFR BLD AUTO: 0.6 % (ref 0–1.5)
BDY SITE: ABNORMAL
BILIRUB SERPL-MCNC: 0.3 MG/DL (ref 0–1.2)
BUN SERPL-MCNC: 7 MG/DL (ref 6–20)
BUN/CREAT SERPL: 9.5 (ref 7–25)
CALCIUM SPEC-SCNC: 9 MG/DL (ref 8.6–10.5)
CHLORIDE SERPL-SCNC: 105 MMOL/L (ref 98–107)
CO2 BLDA-SCNC: 27.4 MMOL/L (ref 22–29)
CO2 SERPL-SCNC: 26 MMOL/L (ref 22–29)
CREAT SERPL-MCNC: 0.74 MG/DL (ref 0.57–1)
DEPRECATED RDW RBC AUTO: 45.9 FL (ref 37–54)
EOSINOPHIL # BLD AUTO: 0 10*3/MM3 (ref 0–0.4)
EOSINOPHIL NFR BLD AUTO: 0.3 % (ref 0.3–6.2)
ERYTHROCYTE [DISTWIDTH] IN BLOOD BY AUTOMATED COUNT: 16.4 % (ref 12.3–15.4)
GFR SERPL CREATININE-BSD FRML MDRD: 93 ML/MIN/1.73
GLOBULIN UR ELPH-MCNC: 3 GM/DL
GLUCOSE SERPL-MCNC: 112 MG/DL (ref 65–99)
HCG SERPL QL: NEGATIVE
HCO3 BLDA-SCNC: 26.2 MMOL/L (ref 21–28)
HCT VFR BLD AUTO: 42.7 % (ref 34–46.6)
HEMODILUTION: NO
HGB BLD-MCNC: 13.6 G/DL (ref 12–15.9)
INHALED O2 CONCENTRATION: 21 %
LYMPHOCYTES # BLD AUTO: 0.8 10*3/MM3 (ref 0.7–3.1)
LYMPHOCYTES NFR BLD AUTO: 18 % (ref 19.6–45.3)
MCH RBC QN AUTO: 25 PG (ref 26.6–33)
MCHC RBC AUTO-ENTMCNC: 31.8 G/DL (ref 31.5–35.7)
MCV RBC AUTO: 78.5 FL (ref 79–97)
MODALITY: ABNORMAL
MONOCYTES # BLD AUTO: 0.3 10*3/MM3 (ref 0.1–0.9)
MONOCYTES NFR BLD AUTO: 6.9 % (ref 5–12)
NEUTROPHILS NFR BLD AUTO: 3.2 10*3/MM3 (ref 1.7–7)
NEUTROPHILS NFR BLD AUTO: 74.2 % (ref 42.7–76)
NRBC BLD AUTO-RTO: 0.2 /100 WBC (ref 0–0.2)
PCO2 BLDA: 39.9 MM HG (ref 35–48)
PH BLDA: 7.42 PH UNITS (ref 7.35–7.45)
PLATELET # BLD AUTO: 206 10*3/MM3 (ref 140–450)
PMV BLD AUTO: 6.8 FL (ref 6–12)
PO2 BLDA: 63.6 MM HG (ref 83–108)
POTASSIUM SERPL-SCNC: 3.6 MMOL/L (ref 3.5–5.2)
PROCALCITONIN SERPL-MCNC: 0.2 NG/ML (ref 0–0.25)
PROT SERPL-MCNC: 6.9 G/DL (ref 6–8.5)
RBC # BLD AUTO: 5.43 10*6/MM3 (ref 3.77–5.28)
SAO2 % BLDCOA: 92.4 % (ref 94–98)
SODIUM SERPL-SCNC: 141 MMOL/L (ref 136–145)
WBC # BLD AUTO: 4.3 10*3/MM3 (ref 3.4–10.8)
WHOLE BLOOD HOLD SPECIMEN: NORMAL

## 2020-12-09 PROCEDURE — 71275 CT ANGIOGRAPHY CHEST: CPT

## 2020-12-09 PROCEDURE — 94760 N-INVAS EAR/PLS OXIMETRY 1: CPT

## 2020-12-09 PROCEDURE — 25010000002 DEXAMETHASONE SODIUM PHOSPHATE 10 MG/ML SOLUTION: Performed by: NURSE PRACTITIONER

## 2020-12-09 PROCEDURE — 80053 COMPREHEN METABOLIC PANEL: CPT | Performed by: NURSE PRACTITIONER

## 2020-12-09 PROCEDURE — 84703 CHORIONIC GONADOTROPIN ASSAY: CPT | Performed by: NURSE PRACTITIONER

## 2020-12-09 PROCEDURE — 96374 THER/PROPH/DIAG INJ IV PUSH: CPT

## 2020-12-09 PROCEDURE — 99285 EMERGENCY DEPT VISIT HI MDM: CPT

## 2020-12-09 PROCEDURE — 84145 PROCALCITONIN (PCT): CPT | Performed by: NURSE PRACTITIONER

## 2020-12-09 PROCEDURE — 36600 WITHDRAWAL OF ARTERIAL BLOOD: CPT

## 2020-12-09 PROCEDURE — 94799 UNLISTED PULMONARY SVC/PX: CPT

## 2020-12-09 PROCEDURE — 99219 PR INITIAL OBSERVATION CARE/DAY 50 MINUTES: CPT | Performed by: STUDENT IN AN ORGANIZED HEALTH CARE EDUCATION/TRAINING PROGRAM

## 2020-12-09 PROCEDURE — 94640 AIRWAY INHALATION TREATMENT: CPT

## 2020-12-09 PROCEDURE — 0 IOPAMIDOL PER 1 ML: Performed by: NURSE PRACTITIONER

## 2020-12-09 PROCEDURE — 82803 BLOOD GASES ANY COMBINATION: CPT

## 2020-12-09 PROCEDURE — 85025 COMPLETE CBC W/AUTO DIFF WBC: CPT | Performed by: NURSE PRACTITIONER

## 2020-12-09 RX ORDER — DEXAMETHASONE SODIUM PHOSPHATE 10 MG/ML
10 INJECTION, SOLUTION INTRAMUSCULAR; INTRAVENOUS ONCE
Status: COMPLETED | OUTPATIENT
Start: 2020-12-09 | End: 2020-12-09

## 2020-12-09 RX ORDER — ACETAMINOPHEN 500 MG
1000 TABLET ORAL ONCE
Status: COMPLETED | OUTPATIENT
Start: 2020-12-09 | End: 2020-12-09

## 2020-12-09 RX ORDER — ALBUTEROL SULFATE 90 UG/1
2 AEROSOL, METERED RESPIRATORY (INHALATION) ONCE
Status: COMPLETED | OUTPATIENT
Start: 2020-12-09 | End: 2020-12-09

## 2020-12-09 RX ORDER — SODIUM CHLORIDE 0.9 % (FLUSH) 0.9 %
10 SYRINGE (ML) INJECTION AS NEEDED
Status: DISCONTINUED | OUTPATIENT
Start: 2020-12-09 | End: 2020-12-11 | Stop reason: HOSPADM

## 2020-12-09 RX ADMIN — DEXAMETHASONE SODIUM PHOSPHATE 10 MG: 10 INJECTION, SOLUTION INTRAMUSCULAR; INTRAVENOUS at 20:50

## 2020-12-09 RX ADMIN — ACETAMINOPHEN 1000 MG: 500 TABLET, FILM COATED ORAL at 20:50

## 2020-12-09 RX ADMIN — IOPAMIDOL 80 ML: 755 INJECTION, SOLUTION INTRAVENOUS at 22:16

## 2020-12-09 RX ADMIN — SODIUM CHLORIDE 500 ML: 9 INJECTION, SOLUTION INTRAVENOUS at 20:51

## 2020-12-09 RX ADMIN — ALBUTEROL SULFATE 2 PUFF: 108 AEROSOL, METERED RESPIRATORY (INHALATION) at 20:20

## 2020-12-10 PROBLEM — E66.9 SUPER OBESE: Status: RESOLVED | Noted: 2020-09-15 | Resolved: 2020-12-10

## 2020-12-10 PROBLEM — E66.01 SUPER-SUPER OBESE (HCC): Status: RESOLVED | Noted: 2020-02-19 | Resolved: 2020-12-10

## 2020-12-10 PROBLEM — U07.1 PNEUMONIA DUE TO COVID-19 VIRUS: Status: ACTIVE | Noted: 2020-12-10

## 2020-12-10 PROBLEM — F81.9 LEARNING DISABILITY: Chronic | Status: ACTIVE | Noted: 2019-07-01

## 2020-12-10 PROBLEM — I10 HYPERTENSION: Chronic | Status: ACTIVE | Noted: 2019-07-01

## 2020-12-10 PROBLEM — J12.82 PNEUMONIA DUE TO COVID-19 VIRUS: Status: ACTIVE | Noted: 2020-12-10

## 2020-12-10 PROBLEM — E66.01 MORBID OBESITY: Status: RESOLVED | Noted: 2020-01-28 | Resolved: 2020-12-10

## 2020-12-10 PROBLEM — E66.01 MORBID OBESITY WITH BMI OF 50.0-59.9, ADULT (HCC): Chronic | Status: ACTIVE | Noted: 2020-09-15

## 2020-12-10 LAB
B PARAPERT DNA SPEC QL NAA+PROBE: NOT DETECTED
B PERT DNA SPEC QL NAA+PROBE: NOT DETECTED
BASOPHILS # BLD AUTO: 0 10*3/MM3 (ref 0–0.2)
BASOPHILS NFR BLD AUTO: 0.1 % (ref 0–1.5)
C PNEUM DNA NPH QL NAA+NON-PROBE: NOT DETECTED
D DIMER PPP FEU-MCNC: 0.4 MG/L (FEU) (ref 0–0.59)
DEPRECATED RDW RBC AUTO: 46.8 FL (ref 37–54)
EOSINOPHIL # BLD AUTO: 0 10*3/MM3 (ref 0–0.4)
EOSINOPHIL NFR BLD AUTO: 0 % (ref 0.3–6.2)
ERYTHROCYTE [DISTWIDTH] IN BLOOD BY AUTOMATED COUNT: 16.4 % (ref 12.3–15.4)
FERRITIN SERPL-MCNC: 224.4 NG/ML (ref 13–150)
FLUAV SUBTYP SPEC NAA+PROBE: NOT DETECTED
FLUBV RNA ISLT QL NAA+PROBE: NOT DETECTED
HADV DNA SPEC NAA+PROBE: NOT DETECTED
HCOV 229E RNA SPEC QL NAA+PROBE: NOT DETECTED
HCOV HKU1 RNA SPEC QL NAA+PROBE: NOT DETECTED
HCOV NL63 RNA SPEC QL NAA+PROBE: NOT DETECTED
HCOV OC43 RNA SPEC QL NAA+PROBE: NOT DETECTED
HCT VFR BLD AUTO: 39.3 % (ref 34–46.6)
HGB BLD-MCNC: 12.4 G/DL (ref 12–15.9)
HMPV RNA NPH QL NAA+NON-PROBE: NOT DETECTED
HPIV1 RNA SPEC QL NAA+PROBE: NOT DETECTED
HPIV2 RNA SPEC QL NAA+PROBE: NOT DETECTED
HPIV3 RNA NPH QL NAA+PROBE: NOT DETECTED
HPIV4 P GENE NPH QL NAA+PROBE: NOT DETECTED
LDH SERPL-CCNC: 220 U/L (ref 135–214)
LYMPHOCYTES # BLD AUTO: 0.4 10*3/MM3 (ref 0.7–3.1)
LYMPHOCYTES NFR BLD AUTO: 6.7 % (ref 19.6–45.3)
M PNEUMO IGG SER IA-ACNC: NOT DETECTED
MCH RBC QN AUTO: 25.3 PG (ref 26.6–33)
MCHC RBC AUTO-ENTMCNC: 31.6 G/DL (ref 31.5–35.7)
MCV RBC AUTO: 80 FL (ref 79–97)
MONOCYTES # BLD AUTO: 0.1 10*3/MM3 (ref 0.1–0.9)
MONOCYTES NFR BLD AUTO: 1.5 % (ref 5–12)
NEUTROPHILS NFR BLD AUTO: 4.8 10*3/MM3 (ref 1.7–7)
NEUTROPHILS NFR BLD AUTO: 91.7 % (ref 42.7–76)
NRBC BLD AUTO-RTO: 0.1 /100 WBC (ref 0–0.2)
PLATELET # BLD AUTO: 215 10*3/MM3 (ref 140–450)
PMV BLD AUTO: 7.4 FL (ref 6–12)
RBC # BLD AUTO: 4.91 10*6/MM3 (ref 3.77–5.28)
RHINOVIRUS RNA SPEC NAA+PROBE: NOT DETECTED
RSV RNA NPH QL NAA+NON-PROBE: NOT DETECTED
TSH SERPL DL<=0.05 MIU/L-ACNC: 1.07 UIU/ML (ref 0.27–4.2)
WBC # BLD AUTO: 5.3 10*3/MM3 (ref 3.4–10.8)

## 2020-12-10 PROCEDURE — 94760 N-INVAS EAR/PLS OXIMETRY 1: CPT

## 2020-12-10 PROCEDURE — 85379 FIBRIN DEGRADATION QUANT: CPT | Performed by: STUDENT IN AN ORGANIZED HEALTH CARE EDUCATION/TRAINING PROGRAM

## 2020-12-10 PROCEDURE — G0378 HOSPITAL OBSERVATION PER HR: HCPCS

## 2020-12-10 PROCEDURE — 94799 UNLISTED PULMONARY SVC/PX: CPT

## 2020-12-10 PROCEDURE — 0100U HC BIOFIRE FILMARRAY RESP PANEL 2: CPT | Performed by: STUDENT IN AN ORGANIZED HEALTH CARE EDUCATION/TRAINING PROGRAM

## 2020-12-10 PROCEDURE — 94640 AIRWAY INHALATION TREATMENT: CPT

## 2020-12-10 PROCEDURE — 83615 LACTATE (LD) (LDH) ENZYME: CPT | Performed by: STUDENT IN AN ORGANIZED HEALTH CARE EDUCATION/TRAINING PROGRAM

## 2020-12-10 PROCEDURE — 25010000002 ENOXAPARIN PER 10 MG: Performed by: STUDENT IN AN ORGANIZED HEALTH CARE EDUCATION/TRAINING PROGRAM

## 2020-12-10 PROCEDURE — 96372 THER/PROPH/DIAG INJ SC/IM: CPT

## 2020-12-10 PROCEDURE — 82728 ASSAY OF FERRITIN: CPT | Performed by: STUDENT IN AN ORGANIZED HEALTH CARE EDUCATION/TRAINING PROGRAM

## 2020-12-10 PROCEDURE — 84443 ASSAY THYROID STIM HORMONE: CPT | Performed by: STUDENT IN AN ORGANIZED HEALTH CARE EDUCATION/TRAINING PROGRAM

## 2020-12-10 PROCEDURE — 85025 COMPLETE CBC W/AUTO DIFF WBC: CPT | Performed by: STUDENT IN AN ORGANIZED HEALTH CARE EDUCATION/TRAINING PROGRAM

## 2020-12-10 PROCEDURE — 99225 PR SBSQ OBSERVATION CARE/DAY 25 MINUTES: CPT | Performed by: INTERNAL MEDICINE

## 2020-12-10 RX ORDER — GUAIFENESIN/DEXTROMETHORPHAN 100-10MG/5
10 SYRUP ORAL EVERY 4 HOURS PRN
Status: DISCONTINUED | OUTPATIENT
Start: 2020-12-10 | End: 2020-12-11 | Stop reason: HOSPADM

## 2020-12-10 RX ORDER — DEXAMETHASONE SODIUM PHOSPHATE 4 MG/ML
6 INJECTION, SOLUTION INTRA-ARTICULAR; INTRALESIONAL; INTRAMUSCULAR; INTRAVENOUS; SOFT TISSUE
Status: DISCONTINUED | OUTPATIENT
Start: 2020-12-10 | End: 2020-12-11 | Stop reason: HOSPADM

## 2020-12-10 RX ORDER — BENZONATATE 100 MG/1
100 CAPSULE ORAL 3 TIMES DAILY PRN
Status: DISCONTINUED | OUTPATIENT
Start: 2020-12-10 | End: 2020-12-11 | Stop reason: HOSPADM

## 2020-12-10 RX ORDER — ALBUTEROL SULFATE 90 UG/1
2 AEROSOL, METERED RESPIRATORY (INHALATION)
Status: DISCONTINUED | OUTPATIENT
Start: 2020-12-10 | End: 2020-12-11 | Stop reason: HOSPADM

## 2020-12-10 RX ORDER — KETOTIFEN FUMARATE 0.35 MG/ML
1 SOLUTION/ DROPS OPHTHALMIC 2 TIMES DAILY
Status: DISCONTINUED | OUTPATIENT
Start: 2020-12-10 | End: 2020-12-11 | Stop reason: HOSPADM

## 2020-12-10 RX ORDER — ALBUTEROL SULFATE 90 UG/1
2 AEROSOL, METERED RESPIRATORY (INHALATION) EVERY 6 HOURS PRN
Status: DISCONTINUED | OUTPATIENT
Start: 2020-12-10 | End: 2020-12-11 | Stop reason: HOSPADM

## 2020-12-10 RX ORDER — ACETAMINOPHEN 650 MG/1
650 SUPPOSITORY RECTAL EVERY 4 HOURS PRN
Status: DISCONTINUED | OUTPATIENT
Start: 2020-12-10 | End: 2020-12-11 | Stop reason: HOSPADM

## 2020-12-10 RX ORDER — IPRATROPIUM BROMIDE AND ALBUTEROL SULFATE 2.5; .5 MG/3ML; MG/3ML
3 SOLUTION RESPIRATORY (INHALATION) EVERY 6 HOURS PRN
Status: DISCONTINUED | OUTPATIENT
Start: 2020-12-10 | End: 2020-12-10

## 2020-12-10 RX ORDER — SODIUM CHLORIDE 0.9 % (FLUSH) 0.9 %
10 SYRINGE (ML) INJECTION EVERY 12 HOURS SCHEDULED
Status: DISCONTINUED | OUTPATIENT
Start: 2020-12-10 | End: 2020-12-11 | Stop reason: HOSPADM

## 2020-12-10 RX ORDER — LEVOTHYROXINE SODIUM 112 UG/1
112 TABLET ORAL DAILY
Status: DISCONTINUED | OUTPATIENT
Start: 2020-12-10 | End: 2020-12-11 | Stop reason: HOSPADM

## 2020-12-10 RX ORDER — ONDANSETRON 4 MG/1
4 TABLET, FILM COATED ORAL EVERY 6 HOURS PRN
Status: DISCONTINUED | OUTPATIENT
Start: 2020-12-10 | End: 2020-12-11 | Stop reason: HOSPADM

## 2020-12-10 RX ORDER — IBUPROFEN 200 MG
200 TABLET ORAL EVERY 6 HOURS PRN
COMMUNITY
End: 2021-07-19

## 2020-12-10 RX ORDER — KETOROLAC TROMETHAMINE 30 MG/ML
30 INJECTION, SOLUTION INTRAMUSCULAR; INTRAVENOUS EVERY 6 HOURS PRN
Status: DISCONTINUED | OUTPATIENT
Start: 2020-12-10 | End: 2020-12-11 | Stop reason: HOSPADM

## 2020-12-10 RX ORDER — CHOLECALCIFEROL (VITAMIN D3) 125 MCG
5 CAPSULE ORAL NIGHTLY PRN
Status: DISCONTINUED | OUTPATIENT
Start: 2020-12-10 | End: 2020-12-11 | Stop reason: HOSPADM

## 2020-12-10 RX ORDER — METOPROLOL SUCCINATE 50 MG/1
50 TABLET, EXTENDED RELEASE ORAL DAILY
Status: DISCONTINUED | OUTPATIENT
Start: 2020-12-10 | End: 2020-12-11 | Stop reason: HOSPADM

## 2020-12-10 RX ORDER — ACETAMINOPHEN 160 MG/5ML
650 SOLUTION ORAL EVERY 4 HOURS PRN
Status: DISCONTINUED | OUTPATIENT
Start: 2020-12-10 | End: 2020-12-11 | Stop reason: HOSPADM

## 2020-12-10 RX ORDER — ONDANSETRON 2 MG/ML
4 INJECTION INTRAMUSCULAR; INTRAVENOUS EVERY 6 HOURS PRN
Status: DISCONTINUED | OUTPATIENT
Start: 2020-12-10 | End: 2020-12-11 | Stop reason: HOSPADM

## 2020-12-10 RX ORDER — ACETAMINOPHEN 325 MG/1
650 TABLET ORAL EVERY 4 HOURS PRN
Status: DISCONTINUED | OUTPATIENT
Start: 2020-12-10 | End: 2020-12-11 | Stop reason: HOSPADM

## 2020-12-10 RX ORDER — ALBUTEROL SULFATE 90 UG/1
2 AEROSOL, METERED RESPIRATORY (INHALATION)
Status: DISCONTINUED | OUTPATIENT
Start: 2020-12-10 | End: 2020-12-11

## 2020-12-10 RX ORDER — NITROGLYCERIN 0.4 MG/1
0.4 TABLET SUBLINGUAL
Status: DISCONTINUED | OUTPATIENT
Start: 2020-12-10 | End: 2020-12-11 | Stop reason: HOSPADM

## 2020-12-10 RX ORDER — SODIUM CHLORIDE 0.9 % (FLUSH) 0.9 %
10 SYRINGE (ML) INJECTION AS NEEDED
Status: DISCONTINUED | OUTPATIENT
Start: 2020-12-10 | End: 2020-12-11 | Stop reason: HOSPADM

## 2020-12-10 RX ORDER — DEXAMETHASONE 6 MG/1
6 TABLET ORAL
Status: DISCONTINUED | OUTPATIENT
Start: 2020-12-10 | End: 2020-12-11 | Stop reason: HOSPADM

## 2020-12-10 RX ORDER — GUAIFENESIN 600 MG/1
1200 TABLET, EXTENDED RELEASE ORAL EVERY 12 HOURS SCHEDULED
Status: DISCONTINUED | OUTPATIENT
Start: 2020-12-10 | End: 2020-12-11 | Stop reason: HOSPADM

## 2020-12-10 RX ADMIN — METOPROLOL SUCCINATE 50 MG: 50 TABLET, EXTENDED RELEASE ORAL at 08:13

## 2020-12-10 RX ADMIN — Medication 10 ML: at 08:13

## 2020-12-10 RX ADMIN — Medication 10 ML: at 05:29

## 2020-12-10 RX ADMIN — Medication 10 ML: at 20:24

## 2020-12-10 RX ADMIN — GUAIFENESIN 1200 MG: 600 TABLET, EXTENDED RELEASE ORAL at 08:13

## 2020-12-10 RX ADMIN — ALBUTEROL SULFATE 2 PUFF: 108 AEROSOL, METERED RESPIRATORY (INHALATION) at 12:19

## 2020-12-10 RX ADMIN — DEXAMETHASONE 6 MG: 6 TABLET ORAL at 08:13

## 2020-12-10 RX ADMIN — ACETAMINOPHEN 650 MG: 325 TABLET, FILM COATED ORAL at 14:25

## 2020-12-10 RX ADMIN — ALBUTEROL SULFATE 2 PUFF: 90 AEROSOL, METERED RESPIRATORY (INHALATION) at 19:34

## 2020-12-10 RX ADMIN — GUAIFENESIN 1200 MG: 600 TABLET, EXTENDED RELEASE ORAL at 20:24

## 2020-12-10 RX ADMIN — LEVOTHYROXINE SODIUM 112 MCG: 0.11 TABLET ORAL at 08:13

## 2020-12-10 RX ADMIN — ENOXAPARIN SODIUM 40 MG: 40 INJECTION SUBCUTANEOUS at 20:24

## 2020-12-10 RX ADMIN — ENOXAPARIN SODIUM 40 MG: 40 INJECTION SUBCUTANEOUS at 05:29

## 2020-12-10 NOTE — ED PROVIDER NOTES
Subjective   30-year-old morbidly obese female presents with 1 week history of worsening dyspnea with chest tightness, cough, fever; having been diagnosed with Covid 4 days prior.  Able to reproduce chest pain with palpation deep inspiration.    1. Location: Upper chest  2. Quality: Tightness  3. Severity: Moderate  4. Worsening factors: Cough, deep inspiration, palpation  5. Alleviating factors: Denies  6. Onset: 1 week, worse 4 days ago  7. Radiation: Diffuse upper chest  8. Frequency: Intermittent  9. Co-morbidities: Past Medical History:  No date: Anesthesia complication      Comment:  lips swollen after surgeries, ? mouth gaurd, has trouble               with respirations during surgery  No date: Depression  No date: Disease of thyroid gland  No date: Hypertension  No date: Learning disabilities  No date: Low back pain  No date: Obesity  No date: BEBA (obstructive sleep apnea)      Comment:  uses CPAP  No date: Snoring  No date: Uterine cancer (CMS/[facility])      Comment:  [facility]- Dr. Eva Belle  10. Source: Patient            Review of Systems   Constitutional: Positive for fatigue and fever. Negative for chills and diaphoresis.   HENT: Negative for congestion, ear pain, rhinorrhea, sneezing, sore throat, trouble swallowing and voice change.    Respiratory: Positive for cough, chest tightness and shortness of breath. Negative for wheezing and stridor.    Cardiovascular: Negative for chest pain, palpitations and leg swelling.   Gastrointestinal: Negative for nausea and vomiting.   Skin: Negative for color change, pallor and rash.   Allergic/Immunologic: Negative for immunocompromised state.   Neurological: Negative for dizziness and weakness.   Psychiatric/Behavioral: Negative for confusion.   All other systems reviewed and are negative.      Past Medical History:   Diagnosis Date   • Anesthesia complication     lips swollen after surgeries, ? mouth gaurd, has trouble with respirations during surgery   •  Depression    • Disease of thyroid gland    • Hypertension    • Learning disabilities    • Low back pain    • Obesity    • BEBA (obstructive sleep apnea)     uses CPAP   • Snoring    • Uterine cancer (CMS/HCC)     [facility]- Dr. Eva Belle       No Known Allergies    Past Surgical History:   Procedure Laterality Date   • DILATATION AND CURETTAGE     • ENDOSCOPY N/A 2020    Procedure: ESOPHAGOGASTRODUODENOSCOPY WITH BIOPSY,;  Surgeon: Elle Bill MD;  Location: [facility] ENDOSCOPY;  Service: General;  Laterality: N/A;   • GASTRIC SLEEVE LAPAROSCOPIC N/A 2020    Procedure: GASTRIC SLEEVE LAPAROSCOPIC;  Surgeon: Elle Bill MD;  Location: [facility] MAIN OR;  Service: Bariatric;  Laterality: N/A;       Family History   Problem Relation Age of Onset   • Cancer Father    • Hyperlipidemia Father    • Stroke Father    • Arthritis Father         N/A   • Depression Sister         N/A   • Learning disabilities Sister    • Diabetes Maternal Grandmother    • Cancer Maternal Grandmother         N/A   • Heart attack Maternal Grandfather    • Heart disease Maternal Grandfather    • Cancer Paternal Grandmother         Bone Cancer   • Emphysema Paternal Grandfather    • Arthritis Mother         N/A   • Early death Brother         He  in his crib   • Miscarriages / Stillbirths Sister         Miscarriages       Social History     Socioeconomic History   • Marital status:      Spouse name: Not on file   • Number of children: Not on file   • Years of education: Not on file   • Highest education level: Not on file   Tobacco Use   • Smoking status: Former Smoker     Packs/day: 0.00     Years: 0.00     Pack years: 0.00     Types: Cigarettes     Quit date:      Years since quittin.9   • Smokeless tobacco: Never Used   Substance and Sexual Activity   • Alcohol use: No     Frequency: Never     Comment: used to drink weekly   • Drug use: No   • Sexual activity: Defer     Partners: Male     Birth control/protection:  None           Objective   Physical Exam  Vitals signs and nursing note reviewed.   Constitutional:       General: She is awake. She is not in acute distress.     Appearance: She is well-developed. She is morbidly obese. She is ill-appearing.   HENT:      Head: Normocephalic and atraumatic.      Nose: Nose normal.      Mouth/Throat:      Lips: Pink. No lesions.      Mouth: Mucous membranes are moist.      Pharynx: Oropharynx is clear.   Eyes:      Extraocular Movements: Extraocular movements intact.      Conjunctiva/sclera: Conjunctivae normal.      Pupils: Pupils are equal, round, and reactive to light.   Neck:      Musculoskeletal: Normal range of motion and neck supple.   Cardiovascular:      Rate and Rhythm: Regular rhythm. Tachycardia present.      Pulses: Normal pulses.           Radial pulses are 2+ on the right side and 2+ on the left side.        Dorsalis pedis pulses are 2+ on the right side and 2+ on the left side.        Posterior tibial pulses are 2+ on the right side and 2+ on the left side.      Heart sounds: Normal heart sounds, S1 normal and S2 normal. Heart sounds not distant. No murmur. No friction rub. No gallop.    Pulmonary:      Effort: Pulmonary effort is normal. No respiratory distress.      Breath sounds: Wheezing present. No rales.   Chest:      Chest wall: No tenderness.   Abdominal:      General: Bowel sounds are normal. There is no distension.      Palpations: Abdomen is soft. There is no mass.      Tenderness: There is no abdominal tenderness. There is no guarding or rebound.      Hernia: No hernia is present.   Skin:     General: Skin is warm and dry.      Capillary Refill: Capillary refill takes less than 2 seconds.      Coloration: Skin is not pale.      Findings: No erythema or rash.   Neurological:      Mental Status: She is alert and oriented to person, place, and time.   Psychiatric:         Behavior: Behavior normal. Behavior is cooperative.         Thought Content: Thought  content normal.         Judgment: Judgment normal.         Procedures           ED Course      Ct Chest Pulmonary Embolism    Result Date: 12/9/2020  1. Negative for pulmonary embolus. 2. There is moderately extensive areas of groundglass and airspace consolidation in the lungs. This is consistent with pneumonia. The distribution is not specific. Other pathology such as drug toxicity, or connective tissue disorder can cause similar appearance. 2. Mild cardiomegaly.  Electronically Signed By-Gabrielle Washington MD On:12/9/2020 10:32 PM This report was finalized on [id number] by  Gabrielle Washington MD.    Medications   sodium chloride 0.9 % flush 10 mL (has no administration in time range)   sodium chloride 0.9 % bolus 500 mL (0 mL Intravenous Stopped 12/10/20 0023)   dexamethasone sodium phosphate injection 10 mg (10 mg Intravenous Given 12/9/20 2050)   albuterol sulfate HFA (PROVENTIL HFA;VENTOLIN HFA;PROAIR HFA) inhaler 2 puff (2 puffs Inhalation Given 12/9/20 2020)   acetaminophen (TYLENOL) tablet 1,000 mg (1,000 mg Oral Given 12/9/20 2050)   iopamidol (ISOVUE-370) 76 % injection 100 mL (80 mL Intravenous Given 12/9/20 2216)     Labs Reviewed   BLOOD GAS, ARTERIAL - Abnormal; Notable for the following components:       Result Value    pO2, Arterial 63.6 (*)     O2 Saturation, Arterial 92.4 (*)     All other components within normal limits   COMPREHENSIVE METABOLIC PANEL - Abnormal; Notable for the following components:    Glucose 112 (*)     ALT (SGPT) 54 (*)     AST (SGOT) 45 (*)     All other components within normal limits    Narrative:     GFR Normal >60  Chronic Kidney Disease <60  Kidney Failure <15     CBC WITH AUTO DIFFERENTIAL - Abnormal; Notable for the following components:    RBC 5.43 (*)     MCV 78.5 (*)     MCH 25.0 (*)     RDW 16.4 (*)     Lymphocyte % 18.0 (*)     All other components within normal limits   PROCALCITONIN - Normal    Narrative:     As a Marker for Sepsis (Non-Neonates):   1. <0.5 ng/mL  "represents a low risk of severe sepsis and/or septic shock.  1. >2 ng/mL represents a high risk of severe sepsis and/or septic shock.    As a Marker for Lower Respiratory Tract Infections that require antibiotic therapy:  PCT on Admission     Antibiotic Therapy             6-12 Hrs later  > 0.5                Strongly Recommended            >0.25 - <0.5         Recommended  0.1 - 0.25           Discouraged                   Remeasure/reassess PCT  <0.1                 Strongly Discouraged          Remeasure/reassess PCT      As 28 day mortality risk marker: \"Change in Procalcitonin Result\" (> 80 % or <=80 %) if Day 0 (or Day 1) and Day 4 values are available. Refer to [email or web address]-[email or web address]/   Change in PCT <=80 %   A decrease of PCT levels below or equal to 80 % defines a positive change in PCT test result representing a higher risk for 28-day all-cause mortality of patients diagnosed with severe sepsis or septic shock.  Change in PCT > 80 %   A decrease of PCT levels of more than 80 % defines a negative change in PCT result representing a lower risk for 28-day all-cause mortality of patients diagnosed with severe sepsis or septic shock.                Results may be falsely decreased if patient taking Biotin.    HCG, SERUM, QUALITATIVE - Normal   BLOOD GAS, ARTERIAL   CBC AND DIFFERENTIAL    Narrative:     The following orders were created for panel order CBC & Differential.  Procedure                               Abnormality         Status                     ---------                               -----------         ------                     CBC Auto Differential[[id number]]        Abnormal            Final result                 Please view results for these tests on the individual orders.   EXTRA TUBES    Narrative:     The following orders were created for panel order Extra Tubes.  Procedure                               Abnormality         Status                     ---------                  "              -----------         ------                     Light Blue Top[[id number]]                                   Final result                 Please view results for these tests on the individual orders.   LIGHT BLUE TOP                                          MDM  Number of Diagnoses or Management Options  Elevated LFTs:   Hypoxia:   Pneumonia due to COVID-19 virus:   Diagnosis management comments: Chart Review: 12/5/2020 patient was seen at this facility and diagnosed with Covid.  Comorbidity: Past Medical History:  No date: Anesthesia complication      Comment:  lips swollen after surgeries, ? mouth gaurd, has trouble               with respirations during surgery  No date: Depression  No date: Disease of thyroid gland  No date: Hypertension  No date: Learning disabilities  No date: Low back pain  No date: Obesity  No date: BEBA (obstructive sleep apnea)      Comment:  uses CPAP  No date: Snoring  No date: Uterine cancer (CMS/HCC)      Comment:  [facility]- Dr. Eva Belle  Imaging: Was interpreted by physician and reviewed by myself: Ct Chest Pulmonary Embolism    Result Date: 12/9/2020  1. Negative for pulmonary embolus. 2. There is moderately extensive areas of groundglass and airspace consolidation in the lungs. This is consistent with pneumonia. The distribution is not specific. Other pathology such as drug toxicity, or connective tissue disorder can cause similar appearance. 2. Mild cardiomegaly.  Electronically Signed By-Gabrielle Washington MD On:12/9/2020 10:32 PM This report was finalized on [id number] by  Gabrielle Washington MD.    Patient undressed and placed in gown for exam.  Appropriate PPE worn during patient exam. 30-year-old morbidly obese female presents with 1 week history of worsening dyspnea with chest tightness, cough, fever; having been diagnosed with Covid 4 days prior.  Able to reproduce chest pain with palpation deep inspiration.  IV established and labs obtained.  Covid protocol  initiated.  Patient was given Decadron 10 mg IV.  She was noted to be hypoxic and placed on 2 L nasal cannula, with improvement of her oxygenation.  Given her morbidly obesity, tachycardia, dyspnea, hypoxia and recent diagnosis of Covid; CT of the chest PE protocol initiated with the above findings. Aside from ALT and AST slightly elevated at 54 and 45, CMP unremarkable.  CBC essentially unremarkable.  hCG negative.  ABG was consistent with hypoxia with PO2 of 63.6 and O2 sat of 92.4, otherwise unremarkable.  CT was negative for PE but significant for Covid pneumonia.  The hospitalist was paged for admission.  Spoke with FATOUMATA Amezcua accepted admission on behalf of Dr. Fabian.    Disposition/Treatment: Discussed results with patient, verbalized understanding. Agreeable with plan of care.  Patient was stable upon admission.    EMR Dragon transcription disclaimer:  Some of this encounter note is an electronic transcription translation of spoken language to printed text. The electronic translation of spoken language may permit erroneous, or at times, nonsensical words or phrases to be inadvertently transcribed; Although I have reviewed the note for such errors some may still exist.              Amount and/or Complexity of Data Reviewed  Clinical lab tests: reviewed  Tests in the radiology section of CPT®: reviewed    Patient Progress  Patient progress: stable      Final diagnoses:   Pneumonia due to COVID-19 virus   Hypoxia   Elevated LFTs            Myriam Joseph NP  12/10/20 0050

## 2020-12-10 NOTE — PROGRESS NOTES
[facility] Medicine Services Daily Progress Note      Hospitalist Team  LOS 0 days      Patient Care Team:  Dea Delaney APRN as PCP - General (Nurse Practitioner)  Provider, No Known    Patient Location: 102/1      Subjective   Subjective     Chief Complaint / Subjective  Chief Complaint   Patient presents with   • Dizziness         Brief Synopsis of Hospital Course/HPI  30 y.o. female who presents to [facility] ED with a history of hypothyroidism, uterine cancer, and BEBA complaining of shortness of breath.  Patient states for the last few days she has become increasingly short of air.  She states she has had a fever of approximately 101 degrees at home with chills and a cough with increased sputum production.  She denies chest pain, abdominal pain, nausea, and vomiting.  There are no known exacerbating or relieving factors noted at this time.     In the ED, glucose 112, ALT 54, AST 45, RDW 16.4.  ABG showed pH 7.424, CO2 39.9, O2 63.6, HCO3 26.2, base excess 1.6.  Patient Covid positive on 12/5/2020.  CT PE protocol shows negative for pulmonary embolus but moderately extensive areas of groundglass and airspace consolidation in the lungs consistent with pneumonia the distribution is nonspecific other pathology such as drug toxicity or connective tissue disorder can cause similar appearance and mild cardiomegaly.  Patient admitted for further evaluation and treatment.         Date::    12/10/2020: Patient seen and examined this morning.  Feels much better compared to yesterday.  She remains on 2 L nasal cannula, wean down to 1 L at the time of my exam.  She still feeling better.  Discussed with nursing to wean off to room air and monitor today.  If feeling better than likely home tomorrow.    Denies any associated fever, chills, night sweats, chest pain, abdominal pain, nausea, vomiting, diarrhea, dysuria, or dizziness.      Review of Systems   All other systems reviewed and  "are negative.        Objective   Objective      Vital Signs  Temp:  [96.5 °F (35.8 °C)-101 °F (38.3 °C)] 97.4 °F (36.3 °C)  Heart Rate:  [] 61  Resp:  [16-20] 18  BP: ()/(58-91) 97/58  Oxygen Therapy  SpO2: 91 %  Pulse Oximetry Type: Continuous  Device (Oxygen Therapy): nasal cannula  Flow (L/min): 1  Flowsheet Rows      First Filed Value   Admission Height  162.6 cm (64\") Documented at 12/09/2020 1954   Admission Weight  (!) 152 kg (335 lb 15.7 oz) Documented at 12/09/2020 1954        Intake & Output (last 3 days)       12/07 0701 - 12/08 0700 12/08 0701 - 12/09 0700 12/09 0701 - 12/10 0700 12/10 0701 - 12/11 0700    P.O.    240    IV Piggyback   500     Total Intake(mL/kg)   500 (3.8) 240 (1.8)    Net   +500 +240            Urine Unmeasured Occurrence   1 x         Lines, Drains & Airways    Active LDAs     Name:   Placement date:   Placement time:   Site:   Days:    Peripheral IV 12/09/20 2049 Right Forearm   12/09/20 2049    Forearm   less than 1                  Physical Exam:    General: Awake, alert, morbidly obese female, NAD  Eyes: PERRL, EOMI, conjunctive are clear  Cardiovascular: Regular rate and rhythm, no murmurs  Respiratory: Clear to auscultation bilaterally, no wheezing or rales, unlabored breathing  Abdomen: Soft, nontender, positive bowel sounds, no guarding  Neurologic: A&O, CN grossly intact, moves all extremities spontaneously  Musculoskeletal: Normal range of motion, no deformities  Skin: Warm, dry, intact            Procedures:              Results Review:     I reviewed the patient's new clinical results.      Lab Results (last 24 hours)     Procedure Component Value Units Date/Time    Respiratory Panel, PCR (WITHOUT COVID) - Swab, Nasopharynx [[id number]]  (Normal) Collected: 12/10/20 0531    Specimen: Swab from Nasopharynx Updated: 12/10/20 0735     ADENOVIRUS, PCR Not Detected     Coronavirus 229E Not Detected     Coronavirus HKU1 Not Detected     Coronavirus NL63 Not " Detected     Coronavirus OC43 Not Detected     Human Metapneumovirus Not Detected     Human Rhinovirus/Enterovirus Not Detected     Influenza B PCR Not Detected     Parainfluenza Virus 1 Not Detected     Parainfluenza Virus 2 Not Detected     Parainfluenza Virus 3 Not Detected     Parainfluenza Virus 4 Not Detected     Bordetella pertussis pcr Not Detected     Chlamydophila pneumoniae PCR Not Detected     Mycoplasma pneumo by PCR Not Detected     Influenza A PCR Not Detected     RSV, PCR Not Detected     Bordetella parapertussis PCR Not Detected    Narrative:      The coronavirus on the RVP is NOT COVID-19 and is NOT indicative of infection with COVID-19.     Lactate Dehydrogenase [[id number]]  (Abnormal) Collected: 12/10/20 0328    Specimen: Blood Updated: 12/10/20 0611      U/L     Ferritin [[id number]]  (Abnormal) Collected: 12/10/20 0328    Specimen: Blood Updated: 12/10/20 0537     Ferritin 224.40 ng/mL     Narrative:      Results may be falsely decreased if patient taking Biotin.      TSH [[id number]]  (Normal) Collected: 12/10/20 0328    Specimen: Blood Updated: 12/10/20 0537     TSH 1.070 uIU/mL     D-dimer, Quantitative [[id number]]  (Normal) Collected: 12/10/20 0328    Specimen: Blood Updated: 12/10/20 0458     D-Dimer, Quantitative 0.40 mg/L (FEU)     Narrative:      Reference Range  --------------------------------------------------------------------     < 0.50   Negative Predictive Value  0.50-0.59   Indeterminate    >= 0.60   Probable VTE             A very low percentage of patients with DVT may yield D-Dimer results   below the cut-off of 0.50 mg/L FEU.  This is known to be more   prevalent in patients with distal DVT.             Results of this test should always be interpreted in conjunction with   the patient's medical history, clinical presentation and other   findings.  Clinical diagnosis should not be based on the result of   INNOVANCE D-Dimer alone.    CBC Auto Differential [[id number]]   (Abnormal) Collected: 12/10/20 0329    Specimen: Blood Updated: 12/10/20 0443     WBC 5.30 10*3/mm3      RBC 4.91 10*6/mm3      Hemoglobin 12.4 g/dL      Hematocrit 39.3 %      MCV 80.0 fL      MCH 25.3 pg      MCHC 31.6 g/dL      RDW 16.4 %      RDW-SD 46.8 fl      MPV 7.4 fL      Platelets 215 10*3/mm3      Neutrophil % 91.7 %      Lymphocyte % 6.7 %      Monocyte % 1.5 %      Eosinophil % 0.0 %      Basophil % 0.1 %      Neutrophils, Absolute 4.80 10*3/mm3      Lymphocytes, Absolute 0.40 10*3/mm3      Monocytes, Absolute 0.10 10*3/mm3      Eosinophils, Absolute 0.00 10*3/mm3      Basophils, Absolute 0.00 10*3/mm3      nRBC 0.1 /100 WBC     Extra Tubes [[id number]] Collected: 12/09/20 2109    Specimen: Blood from Arm, Right Updated: 12/09/20 2215    Narrative:      The following orders were created for panel order Extra Tubes.  Procedure                               Abnormality         Status                     ---------                               -----------         ------                     Light Blue Top[[id number]]                                   Final result                 Please view results for these tests on the individual orders.    Light Blue Top [[id number]] Collected: 12/09/20 2109    Specimen: Blood from Arm, Right Updated: 12/09/20 2215     Extra Tube hold for add-on     Comment: Auto resulted       Procalcitonin [[id number]]  (Normal) Collected: 12/09/20 2109    Specimen: Blood from Arm, Right Updated: 12/09/20 2141     Procalcitonin 0.20 ng/mL     Narrative:      As a Marker for Sepsis (Non-Neonates):   1. <0.5 ng/mL represents a low risk of severe sepsis and/or septic shock.  1. >2 ng/mL represents a high risk of severe sepsis and/or septic shock.    As a Marker for Lower Respiratory Tract Infections that require antibiotic therapy:  PCT on Admission     Antibiotic Therapy             6-12 Hrs later  > 0.5                Strongly Recommended            >0.25 - <0.5         Recommended  0.1 -  "0.25           Discouraged                   Remeasure/reassess PCT  <0.1                 Strongly Discouraged          Remeasure/reassess PCT      As 28 day mortality risk marker: \"Change in Procalcitonin Result\" (> 80 % or <=80 %) if Day 0 (or Day 1) and Day 4 values are available. Refer to [email or web address]   Change in PCT <=80 %   A decrease of PCT levels below or equal to 80 % defines a positive change in PCT test result representing a higher risk for 28-day all-cause mortality of patients diagnosed with severe sepsis or septic shock.  Change in PCT > 80 %   A decrease of PCT levels of more than 80 % defines a negative change in PCT result representing a lower risk for 28-day all-cause mortality of patients diagnosed with severe sepsis or septic shock.                Results may be falsely decreased if patient taking Biotin.     hCG, Serum, Qualitative [[id number]]  (Normal) Collected: 12/09/20 2109    Specimen: Blood from Arm, Right Updated: 12/09/20 2137     HCG Qualitative Negative    Comprehensive Metabolic Panel [[id number]]  (Abnormal) Collected: 12/09/20 2109    Specimen: Blood from Arm, Right Updated: 12/09/20 2135     Glucose 112 mg/dL      BUN 7 mg/dL      Creatinine 0.74 mg/dL      Sodium 141 mmol/L      Potassium 3.6 mmol/L      Comment: Slight hemolysis detected by analyzer. Results may be affected.        Chloride 105 mmol/L      CO2 26.0 mmol/L      Calcium 9.0 mg/dL      Total Protein 6.9 g/dL      Albumin 3.90 g/dL      ALT (SGPT) 54 U/L      AST (SGOT) 45 U/L      Alkaline Phosphatase 83 U/L      Total Bilirubin 0.3 mg/dL      eGFR Non African Amer 93 mL/min/1.73      Globulin 3.0 gm/dL      A/G Ratio 1.3 g/dL      BUN/Creatinine Ratio 9.5     Anion Gap 10.0 mmol/L     Narrative:      GFR Normal >60  Chronic Kidney Disease <60  Kidney Failure <15      CBC & Differential [[id number]]  (Abnormal) Collected: 12/09/20 2109    Specimen: Blood from Arm, Right Updated: 12/09/20 2116 "    Narrative:      The following orders were created for panel order CBC & Differential.  Procedure                               Abnormality         Status                     ---------                               -----------         ------                     CBC Auto Differential[[id number]]        Abnormal            Final result                 Please view results for these tests on the individual orders.    CBC Auto Differential [[id number]]  (Abnormal) Collected: 12/09/20 2109    Specimen: Blood from Arm, Right Updated: 12/09/20 2116     WBC 4.30 10*3/mm3      RBC 5.43 10*6/mm3      Hemoglobin 13.6 g/dL      Hematocrit 42.7 %      MCV 78.5 fL      MCH 25.0 pg      MCHC 31.8 g/dL      RDW 16.4 %      RDW-SD 45.9 fl      MPV 6.8 fL      Platelets 206 10*3/mm3      Neutrophil % 74.2 %      Lymphocyte % 18.0 %      Monocyte % 6.9 %      Eosinophil % 0.3 %      Basophil % 0.6 %      Neutrophils, Absolute 3.20 10*3/mm3      Lymphocytes, Absolute 0.80 10*3/mm3      Monocytes, Absolute 0.30 10*3/mm3      Eosinophils, Absolute 0.00 10*3/mm3      Basophils, Absolute 0.00 10*3/mm3      nRBC 0.2 /100 WBC     Blood Gas, Arterial - [[id number]]  (Abnormal) Collected: 12/09/20 2032    Specimen: Arterial Blood Updated: 12/09/20 2038     Site Left Radial     Jose Alfredo's Test Positive     pH, Arterial 7.424 pH units      pCO2, Arterial 39.9 mm Hg      pO2, Arterial 63.6 mm Hg      HCO3, Arterial 26.2 mmol/L      Base Excess, Arterial 1.6 mmol/L      Comment: Serial Number: [id number]:  [id number]        O2 Saturation, Arterial 92.4 %      CO2 Content 27.4 mmol/L      Barometric Pressure for Blood Gas --     Comment: N/A        Modality Room Air     FIO2 21 %      Hemodilution No        No results found for: HGBA1C        Results from last 7 days   Lab Units 12/09/20 2032   PH, ARTERIAL pH units 7.424   PO2 ART mm Hg 63.6*   PCO2, ARTERIAL mm Hg 39.9   HCO3 ART mmol/L 26.2     Lab Results   Component Value Date    LIPASE 14  05/21/2020     Lab Results   Component Value Date    CHOL 210 (H) 09/22/2020    TRIG 229 (H) 09/22/2020    HDL 20 (L) 09/22/2020     (H) 09/22/2020       Lab Results   Lab Value Date/Time    FINALDX  09/30/2020 1351     Stomach, sleeve gastrectomy:    Reactive gastropathy and lamina propria edema    No evidence of increased inflammation or H. pylori organisms    No evidence of malignancy    JPR/sms       FINALDX  02/21/2020 1454     Mucosa, stomach, biopsy:    Antral-type mucosa with mild chronic gastritis    No Helicobacter pylori organisms identified on H&E    No intestinal metaplasia or dysplasia identified      ROSENDO/tkd          Microbiology Results (last 10 days)     Procedure Component Value - Date/Time    Respiratory Panel, PCR (WITHOUT COVID) - Swab, Nasopharynx [[id number]]  (Normal) Collected: 12/10/20 0531    Lab Status: Final result Specimen: Swab from Nasopharynx Updated: 12/10/20 0755     ADENOVIRUS, PCR Not Detected     Coronavirus 229E Not Detected     Coronavirus HKU1 Not Detected     Coronavirus NL63 Not Detected     Coronavirus OC43 Not Detected     Human Metapneumovirus Not Detected     Human Rhinovirus/Enterovirus Not Detected     Influenza B PCR Not Detected     Parainfluenza Virus 1 Not Detected     Parainfluenza Virus 2 Not Detected     Parainfluenza Virus 3 Not Detected     Parainfluenza Virus 4 Not Detected     Bordetella pertussis pcr Not Detected     Chlamydophila pneumoniae PCR Not Detected     Mycoplasma pneumo by PCR Not Detected     Influenza A PCR Not Detected     RSV, PCR Not Detected     Bordetella parapertussis PCR Not Detected    Narrative:      The coronavirus on the RVP is NOT COVID-19 and is NOT indicative of infection with COVID-19.     COVID PRE-OP / PRE-PROCEDURE SCREENING ORDER (NO ISOLATION) - Swab, Nasopharynx [[id number]]  (Abnormal) Collected: 12/05/20 1532    Lab Status: Final result Specimen: Swab from Nasopharynx Updated: 12/06/20 2119    Narrative:      The  following orders were created for panel order COVID PRE-OP / PRE-PROCEDURE SCREENING ORDER (NO ISOLATION) - Swab, Nasopharynx.  Procedure                               Abnormality         Status                     ---------                               -----------         ------                     COVID-19 PCR, Existence Before [facility]...[[id number]]  Abnormal            Final result                 Please view results for these tests on the individual orders.    COVID-19 PCR, [facility], NP SWAB IN [facility] VIRAL TRANSPORT MEDIA 24-30 HR TAT - Swab, Nasopharynx [[id number]]  (Abnormal) Collected: 12/05/20 1532    Lab Status: Final result Specimen: Swab from Nasopharynx Updated: 12/06/20 2119     SARS-CoV-2 MAXIM Detected          ECG/EMG Results (most recent)     None               Results for orders placed during the hospital encounter of 11/18/19   Adult Transthoracic Echo Complete W/ Cont if Necessary Per Protocol    Narrative TDS:  Overall LV/RV size and function normal  No significant valvulopathy seen in limited suboptimal views mainly   ascertained by Doppler assessment  IVC not seen  Overall valvular morphology is not well seen  Suboptimal study but grossly LV and RV function are normal         Ct Chest Pulmonary Embolism    Result Date: 12/9/2020  1. Negative for pulmonary embolus. 2. There is moderately extensive areas of groundglass and airspace consolidation in the lungs. This is consistent with pneumonia. The distribution is not specific. Other pathology such as drug toxicity, or connective tissue disorder can cause similar appearance. 2. Mild cardiomegaly.  Electronically Signed By-Gabrielle Washington MD On:12/9/2020 10:32 PM This report was finalized on [id number] by  Gabrielle Washington MD.          Xrays, labs reviewed personally by physician.    Medication Review:   I have reviewed the patient's current medication list      Scheduled Meds  albuterol sulfate HFA, 2 puff, Inhalation, 4x Daily - RT  dexamethasone, 6 mg, Oral,  Daily With Breakfast    Or  dexamethasone, 6 mg, Intravenous, Daily With Breakfast  enoxaparin, 40 mg, Subcutaneous, Q12H  guaiFENesin, 1,200 mg, Oral, Q12H  ketotifen, 1 drop, Both Eyes, BID  levothyroxine, 112 mcg, Oral, Daily  metoprolol succinate XL, 50 mg, Oral, Daily  sodium chloride, 10 mL, Intravenous, Q12H        Meds Infusions       Meds PRN  •  acetaminophen **OR** acetaminophen **OR** acetaminophen  •  albuterol sulfate HFA  •  benzonatate  •  guaiFENesin-dextromethorphan  •  ipratropium-albuterol  •  melatonin  •  nitroglycerin  •  ondansetron **OR** ondansetron  •  [COMPLETED] Insert peripheral IV **AND** sodium chloride  •  sodium chloride        Assessment/Plan   Assessment/Plan     Active Hospital Problems    Diagnosis  POA   • **Pneumonia due to COVID-19 virus [U07.1, J12.89]  Yes   • Morbid obesity with BMI of 50.0-59.9, adult (CMS/[facility]) [E66.01, Z68.43]  Not Applicable   • Hypertension [I10]  Yes   • Learning disability [F81.9]  Yes   • Hypothyroidism [E03.9]  Yes      Resolved Hospital Problems   No resolved problems to display.       MEDICAL DECISION MAKING COMPLEXITY BY PROBLEM:     Acute hypoxic respiratory failure secondary to COVID 19 Infection  -CXR reviewed  -LDH, Ferritin, D dimer, and procalcitonin now  -CBC, CMP, CK, CRP, Ferritin daily  -Every 48 hours LDH, D dimer, Fibrinogen  -Dexamethasone 6 mg IV/p.o. daily  -Tessalon Perles as needed  -Robitussin-DM as needed  -RVP  -Continuous cardiac monitor and pulse ox  -IS  -Oxygen supplementation, wean as tolerated to keep oxygen sats >90%  -No nebulized medications  -Wean off to room air today and if remains on room air and afebrile till tomorrow then likely discharge home tomorrow     Hypothyroidism  -TSH normal  -Continue levothyroxine     Essential Hypertension, Chronic, Controlled  -Continue home metoprolol  - Monitor with routine vital signs      Obesity  -encourage lifestyle modifications    VTE Prophylaxis -   Mechanical Order  History:     None      Pharmalogical Order History:      Ordered     Dose Route Frequency Stop    12/10/20 0115  enoxaparin (LOVENOX) syringe 40 mg      40 mg SC Every 12 Hours Scheduled --                  Code Status -   Code Status and Medical Interventions:   Ordered at: 12/10/20 0115     Code Status:    CPR     Medical Interventions (Level of Support Prior to Arrest):    Full       This patient has been examined wearing appropriate Personal Protective Equipment on 12/10/20        Discharge Planning  Likely home tomorrow if better        Electronically signed by Jasson Fabian DO, 12/10/20, 10:41 EST.  [facility]ist Team

## 2020-12-10 NOTE — H&P
[facility] Medicine Services      Patient Name: Cindy Thompson  : 1990  MRN: [id number]  Primary Care Physician: Dea Delaney APRN  Date of admission: 2020    Patient Care Team:  Dea Delaney APRN as PCP - General (Nurse Practitioner)  Provider, No Known          Subjective   History Present Illness     Chief Complaint:   Chief Complaint   Patient presents with   • Dizziness     Ms. Thompson is a 30 y.o. female who presents to [facility] ED with a history of hypothyroidism, uterine cancer, and BEBA complaining of shortness of breath.  Patient states for the last few days she has become increasingly short of air.  She states she has had a fever of approximately 101 degrees at home with chills and a cough with increased sputum production.  She denies chest pain, abdominal pain, nausea, and vomiting.  There are no known exacerbating or relieving factors noted at this time.     In the ED, glucose 112, ALT 54, AST 45, RDW 16.4.  ABG showed pH 7.424, CO2 39.9, O2 63.6, HCO3 26.2, base excess 1.6.  Patient Covid positive on 2020.  CT PE protocol shows negative for pulmonary embolus but moderately extensive areas of groundglass and airspace consolidation in the lungs consistent with pneumonia the distribution is nonspecific other pathology such as drug toxicity or connective tissue disorder can cause similar appearance and mild cardiomegaly.  Patient admitted for further evaluation and treatment.      Review of Systems   Constitution: Positive for chills and fever.   Cardiovascular: Negative for chest pain.   Respiratory: Positive for cough, shortness of breath and sputum production.    Gastrointestinal: Negative for abdominal pain, nausea and vomiting.   Genitourinary: Negative for dysuria.   All other systems reviewed and are negative.        Personal History     Past Medical History:   Past Medical History:   Diagnosis Date   • Anesthesia complication     lips  swollen after surgeries, ? mouth gaurd, has trouble with respirations during surgery   • Depression    • Disease of thyroid gland    • Hypertension    • Learning disabilities    • Low back pain    • Obesity    • BEBA (obstructive sleep apnea)     uses CPAP   • Snoring    • Uterine cancer (CMS/HCC)     [facility]- Dr. Eva Belle       Surgical History:      Past Surgical History:   Procedure Laterality Date   • DILATATION AND CURETTAGE     • ENDOSCOPY N/A 2/21/2020    Procedure: ESOPHAGOGASTRODUODENOSCOPY WITH BIOPSY,;  Surgeon: Elle Bill MD;  Location: [facility] ENDOSCOPY;  Service: General;  Laterality: N/A;   • GASTRIC SLEEVE LAPAROSCOPIC N/A 9/30/2020    Procedure: GASTRIC SLEEVE LAPAROSCOPIC;  Surgeon: Elle Bill MD;  Location: [facility] MAIN OR;  Service: Bariatric;  Laterality: N/A;       Family History: family history includes Arthritis in her father and mother; Cancer in her father, maternal grandmother, and paternal grandmother; Depression in her sister; Diabetes in her maternal grandmother; Early death in her brother; Emphysema in her paternal grandfather; Heart attack in her maternal grandfather; Heart disease in her maternal grandfather; Hyperlipidemia in her father; Learning disabilities in her sister; Miscarriages / Stillbirths in her sister; Stroke in her father. Otherwise pertinent FHx was reviewed and unremarkable.     Social History:  reports that she quit smoking about 8 years ago. Her smoking use included cigarettes. She smoked 0.00 packs per day for 0.00 years. She has never used smokeless tobacco. She reports that she does not drink alcohol or use drugs.      Medications:  Prior to Admission medications    Medication Sig Start Date End Date Taking? Authorizing Provider   acetaminophen (TYLENOL) 650 MG 8 hr tablet 650 mg. 11/12/19   Emergency, Nurse Noemi, RN   HYDROcodone-acetaminophen (NORCO) 5-325 MG per tablet Take 1 tablet by mouth Every 4 (Four) Hours As Needed for Moderate Pain . 10/1/20    Elle Bill MD   levothyroxine (Synthroid) 112 MCG tablet Take 1 tablet by mouth Daily. 9/23/20   Dea Delaney APRN   metoprolol succinate XL (TOPROL-XL) 50 MG 24 hr tablet TAKE 1 TABLET BY MOUTH DAILY 9/10/20   Dea Delaney APRN   olopatadine (Pataday) 0.1 % ophthalmic solution Apply 1 drop to eye(s) as directed by provider 2 (Two) Times a Day. 9/23/20   Dea Delaney APRN   ondansetron (Zofran) 8 MG tablet Take 1 tablet by mouth Every 8 (Eight) Hours As Needed for Nausea or Vomiting. 11/5/20   Marsha Massey APRN   polyethylene glycol (MIRALAX) 17 GM/SCOOP powder Take 17 g by mouth Daily. 11/5/20   Marsha Massey APRN   traMADol (ULTRAM) 50 MG tablet Take 50 mg by mouth Every 12 (Twelve) Hours As Needed. for pain 7/9/20   ProviderSebas MD       Allergies:  No Known Allergies    Objective   Objective     Vital Signs  Temp:  [101 °F (38.3 °C)] 101 °F (38.3 °C)  Heart Rate:  [115-127] 118  Resp:  [16-20] 20  BP: (115-133)/(73-91) 115/73  SpO2:  [90 %-93 %] 93 %  on  Flow (L/min):  [2] 2;   Device (Oxygen Therapy): nasal cannula  Body mass index is 57.67 kg/m².    Physical Exam  Vitals signs reviewed.   Constitutional:       Appearance: Normal appearance. She is obese.   HENT:      Head: Normocephalic and atraumatic.      Nose: Nose normal.      Mouth/Throat:      Mouth: Mucous membranes are moist.      Pharynx: Oropharynx is clear.   Eyes:      Conjunctiva/sclera: Conjunctivae normal.      Pupils: Pupils are equal, round, and reactive to light.   Neck:      Musculoskeletal: Normal range of motion and neck supple.   Cardiovascular:      Rate and Rhythm: Normal rate and regular rhythm.      Pulses: Normal pulses.      Heart sounds: Normal heart sounds.   Pulmonary:      Effort: Pulmonary effort is normal.      Breath sounds: Wheezing and rales present.   Abdominal:      General: Bowel sounds are normal.      Palpations: Abdomen is soft.   Musculoskeletal: Normal range of motion.   Skin:      General: Skin is warm and dry.      Capillary Refill: Capillary refill takes less than 2 seconds.   Neurological:      General: No focal deficit present.      Mental Status: She is alert and oriented to person, place, and time.   Psychiatric:         Mood and Affect: Mood normal.         Behavior: Behavior normal.         Thought Content: Thought content normal.         Judgment: Judgment normal.         Results Review:  I have personally reviewed most recent lab results and radiology images and interpretations and agree with findings, most notably: Radiology results and ABG.    Results from last 7 days   Lab Units 12/09/20  2109   WBC 10*3/mm3 4.30   HEMOGLOBIN g/dL 13.6   HEMATOCRIT % 42.7   PLATELETS 10*3/mm3 206     Results from last 7 days   Lab Units 12/09/20  2109   SODIUM mmol/L 141   POTASSIUM mmol/L 3.6   CHLORIDE mmol/L 105   CO2 mmol/L 26.0   BUN mg/dL 7   CREATININE mg/dL 0.74   GLUCOSE mg/dL 112*   CALCIUM mg/dL 9.0   ALT (SGPT) U/L 54*   AST (SGOT) U/L 45*   PROCALCITONIN ng/mL 0.20     Estimated Creatinine Clearance: 167.2 mL/min (by C-G formula based on SCr of 0.74 mg/dL).  Brief Urine Lab Results  (Last result in the past 365 days)      Color   Clarity   Blood   Leuk Est   Nitrite   Protein   CREAT   Urine HCG        09/30/20 1033               Negative           Microbiology Results (last 10 days)     Procedure Component Value - Date/Time    COVID PRE-OP / PRE-PROCEDURE SCREENING ORDER (NO ISOLATION) - Swab, Nasopharynx [[id number]]  (Abnormal) Collected: 12/05/20 1532    Lab Status: Final result Specimen: Swab from Nasopharynx Updated: 12/06/20 2119    Narrative:      The following orders were created for panel order COVID PRE-OP / PRE-PROCEDURE SCREENING ORDER (NO ISOLATION) - Swab, Nasopharynx.  Procedure                               Abnormality         Status                     ---------                               -----------         ------                     COVID-19 PCRMASHA  LABS...[[id number]]  Abnormal            Final result                 Please view results for these tests on the individual orders.    COVID-19 PCR, [facility], NP SWAB IN [facility] VIRAL TRANSPORT MEDIA 24-30 HR TAT - Swab, Nasopharynx [[id number]]  (Abnormal) Collected: 12/05/20 1532    Lab Status: Final result Specimen: Swab from Nasopharynx Updated: 12/06/20 2119     SARS-CoV-2 MAXIM Detected          ECG/EMG Results (most recent)     None               Results for orders placed during the hospital encounter of 11/18/19   Adult Transthoracic Echo Complete W/ Cont if Necessary Per Protocol    Narrative TDS:  Overall LV/RV size and function normal  No significant valvulopathy seen in limited suboptimal views mainly   ascertained by Doppler assessment  IVC not seen  Overall valvular morphology is not well seen  Suboptimal study but grossly LV and RV function are normal         Ct Chest Pulmonary Embolism    Result Date: 12/9/2020  1. Negative for pulmonary embolus. 2. There is moderately extensive areas of groundglass and airspace consolidation in the lungs. This is consistent with pneumonia. The distribution is not specific. Other pathology such as drug toxicity, or connective tissue disorder can cause similar appearance. 2. Mild cardiomegaly.  Electronically Signed By-Gabrielle Washington MD On:12/9/2020 10:32 PM This report was finalized on [id number] by  Gabrielle Washington MD.        Estimated Creatinine Clearance: 167.2 mL/min (by C-G formula based on SCr of 0.74 mg/dL).    Assessment/Plan   Assessment/Plan       Active Hospital Problems    Diagnosis  POA   • **Pneumonia due to COVID-19 virus [U07.1, J12.89]  Yes     Priority: High   • Morbid obesity with BMI of 50.0-59.9, adult (CMS/[facility]) [E66.01, Z68.43]  Not Applicable   • Hypertension [I10]  Yes   • Learning disability [F81.9]  Yes   • Hypothyroidism [E03.9]  Yes      Resolved Hospital Problems   No resolved problems to display.     COVID 19 Infection  -CXR reviewed  -LDH,  Ferritin, D dimer, and procalcitonin now  -CBC, CMP, CK, CRP, Ferritin daily  -Every 48 hours LDH, D dimer, Fibrinogen  -Dexamethasone 6 mg IV/p.o. daily  -Tessalon Perles as needed  -Robitussin-DM as needed  -RVP  -Continuous cardiac monitor and pulse ox  -IS  -Oxygen supplementation, wean as tolerated to keep oxygen sats >90%  -No nebulized medications    Hypothyroidism  -TSH in am  -Continue levothyroxine    Essential Hypertension, Chronic, Controlled  -Continue home metoprolol  - Monitor with routine vital signs     ?  Alle  -Continue olopatadine eyedrops    Obesity  -encourage lifestyle modifications      VTE Prophylaxis -   Mechanical Order History:     None      Pharmalogical Order History:     Lovenox          CODE STATUS:    Code Status and Medical Interventions:   Ordered at: 12/10/20 0115     Code Status:    CPR     Medical Interventions (Level of Support Prior to Arrest):    Full       This patient has been examined wearing appropriate Personal Protective Equipment. 12/09/20      I discussed the patient's findings and my recommendations with patient.      Signature:Electronically signed by FATOUMATA Quezada, 12/10/20, 4:16 AM EST.      [facility] Hospitalist Team

## 2020-12-10 NOTE — PLAN OF CARE
Goal Outcome Evaluation:  Plan of Care Reviewed With: patient  Progress: no change  Outcome Summary: Pt tested positive for covid 12/5. Increasing shortness of breath. Currently on 2L NC. Will receive steroids. VSS. Will continue to monitor.

## 2020-12-10 NOTE — PLAN OF CARE
Goal Outcome Evaluation:  Plan of Care Reviewed With: patient  Progress: no change   Patient on 1 L NC.  Vitals stable.  Continue to monitor

## 2020-12-11 ENCOUNTER — READMISSION MANAGEMENT (OUTPATIENT)
Dept: CALL CENTER | Facility: HOSPITAL | Age: 30
End: 2020-12-11

## 2020-12-11 VITALS
TEMPERATURE: 97.4 F | WEIGHT: 292.11 LBS | OXYGEN SATURATION: 92 % | RESPIRATION RATE: 18 BRPM | BODY MASS INDEX: 49.87 KG/M2 | DIASTOLIC BLOOD PRESSURE: 73 MMHG | SYSTOLIC BLOOD PRESSURE: 108 MMHG | HEART RATE: 60 BPM | HEIGHT: 64 IN

## 2020-12-11 LAB
ALBUMIN SERPL-MCNC: 3.5 G/DL (ref 3.5–5.2)
ALBUMIN/GLOB SERPL: 1.2 G/DL
ALP SERPL-CCNC: 69 U/L (ref 39–117)
ALT SERPL W P-5'-P-CCNC: 30 U/L (ref 1–33)
ANION GAP SERPL CALCULATED.3IONS-SCNC: 7 MMOL/L (ref 5–15)
AST SERPL-CCNC: 16 U/L (ref 1–32)
BASOPHILS # BLD AUTO: 0 10*3/MM3 (ref 0–0.2)
BASOPHILS NFR BLD AUTO: 0 % (ref 0–1.5)
BILIRUB SERPL-MCNC: 0.2 MG/DL (ref 0–1.2)
BUN SERPL-MCNC: 9 MG/DL (ref 6–20)
BUN/CREAT SERPL: 15.8 (ref 7–25)
CALCIUM SPEC-SCNC: 9.2 MG/DL (ref 8.6–10.5)
CHLORIDE SERPL-SCNC: 107 MMOL/L (ref 98–107)
CK SERPL-CCNC: 11 U/L (ref 20–180)
CO2 SERPL-SCNC: 29 MMOL/L (ref 22–29)
CREAT SERPL-MCNC: 0.57 MG/DL (ref 0.57–1)
CRP SERPL-MCNC: 3.12 MG/DL (ref 0–0.5)
D DIMER PPP FEU-MCNC: 0.22 MG/L (FEU) (ref 0–0.59)
DEPRECATED RDW RBC AUTO: 45.9 FL (ref 37–54)
EOSINOPHIL # BLD AUTO: 0 10*3/MM3 (ref 0–0.4)
EOSINOPHIL NFR BLD AUTO: 0 % (ref 0.3–6.2)
ERYTHROCYTE [DISTWIDTH] IN BLOOD BY AUTOMATED COUNT: 16.4 % (ref 12.3–15.4)
FERRITIN SERPL-MCNC: 270.5 NG/ML (ref 13–150)
FIBRINOGEN PPP-MCNC: 525 MG/DL (ref 210–450)
GFR SERPL CREATININE-BSD FRML MDRD: 126 ML/MIN/1.73
GLOBULIN UR ELPH-MCNC: 2.9 GM/DL
GLUCOSE SERPL-MCNC: 155 MG/DL (ref 65–99)
HCT VFR BLD AUTO: 38.6 % (ref 34–46.6)
HGB BLD-MCNC: 12.3 G/DL (ref 12–15.9)
LDH SERPL-CCNC: 187 U/L (ref 135–214)
LYMPHOCYTES # BLD AUTO: 0.4 10*3/MM3 (ref 0.7–3.1)
LYMPHOCYTES NFR BLD AUTO: 4.6 % (ref 19.6–45.3)
MCH RBC QN AUTO: 25.2 PG (ref 26.6–33)
MCHC RBC AUTO-ENTMCNC: 31.7 G/DL (ref 31.5–35.7)
MCV RBC AUTO: 79.3 FL (ref 79–97)
MONOCYTES # BLD AUTO: 0.4 10*3/MM3 (ref 0.1–0.9)
MONOCYTES NFR BLD AUTO: 4.9 % (ref 5–12)
NEUTROPHILS NFR BLD AUTO: 7.1 10*3/MM3 (ref 1.7–7)
NEUTROPHILS NFR BLD AUTO: 90.5 % (ref 42.7–76)
NRBC BLD AUTO-RTO: 0 /100 WBC (ref 0–0.2)
PLATELET # BLD AUTO: 273 10*3/MM3 (ref 140–450)
PMV BLD AUTO: 7.1 FL (ref 6–12)
POTASSIUM SERPL-SCNC: 4.3 MMOL/L (ref 3.5–5.2)
PROT SERPL-MCNC: 6.4 G/DL (ref 6–8.5)
RBC # BLD AUTO: 4.87 10*6/MM3 (ref 3.77–5.28)
SODIUM SERPL-SCNC: 143 MMOL/L (ref 136–145)
WBC # BLD AUTO: 7.9 10*3/MM3 (ref 3.4–10.8)

## 2020-12-11 PROCEDURE — 99217 PR OBSERVATION CARE DISCHARGE MANAGEMENT: CPT | Performed by: INTERNAL MEDICINE

## 2020-12-11 PROCEDURE — 83615 LACTATE (LD) (LDH) ENZYME: CPT | Performed by: STUDENT IN AN ORGANIZED HEALTH CARE EDUCATION/TRAINING PROGRAM

## 2020-12-11 PROCEDURE — 94618 PULMONARY STRESS TESTING: CPT

## 2020-12-11 PROCEDURE — 85025 COMPLETE CBC W/AUTO DIFF WBC: CPT | Performed by: STUDENT IN AN ORGANIZED HEALTH CARE EDUCATION/TRAINING PROGRAM

## 2020-12-11 PROCEDURE — 85384 FIBRINOGEN ACTIVITY: CPT | Performed by: STUDENT IN AN ORGANIZED HEALTH CARE EDUCATION/TRAINING PROGRAM

## 2020-12-11 PROCEDURE — 80053 COMPREHEN METABOLIC PANEL: CPT | Performed by: STUDENT IN AN ORGANIZED HEALTH CARE EDUCATION/TRAINING PROGRAM

## 2020-12-11 PROCEDURE — 85379 FIBRIN DEGRADATION QUANT: CPT | Performed by: STUDENT IN AN ORGANIZED HEALTH CARE EDUCATION/TRAINING PROGRAM

## 2020-12-11 PROCEDURE — 82550 ASSAY OF CK (CPK): CPT | Performed by: STUDENT IN AN ORGANIZED HEALTH CARE EDUCATION/TRAINING PROGRAM

## 2020-12-11 PROCEDURE — 94799 UNLISTED PULMONARY SVC/PX: CPT

## 2020-12-11 PROCEDURE — 82728 ASSAY OF FERRITIN: CPT | Performed by: STUDENT IN AN ORGANIZED HEALTH CARE EDUCATION/TRAINING PROGRAM

## 2020-12-11 PROCEDURE — 25010000002 ENOXAPARIN PER 10 MG: Performed by: STUDENT IN AN ORGANIZED HEALTH CARE EDUCATION/TRAINING PROGRAM

## 2020-12-11 PROCEDURE — 86140 C-REACTIVE PROTEIN: CPT | Performed by: STUDENT IN AN ORGANIZED HEALTH CARE EDUCATION/TRAINING PROGRAM

## 2020-12-11 PROCEDURE — 94760 N-INVAS EAR/PLS OXIMETRY 1: CPT

## 2020-12-11 PROCEDURE — 96372 THER/PROPH/DIAG INJ SC/IM: CPT

## 2020-12-11 PROCEDURE — G0378 HOSPITAL OBSERVATION PER HR: HCPCS

## 2020-12-11 PROCEDURE — 63710000001 ONDANSETRON PER 8 MG: Performed by: STUDENT IN AN ORGANIZED HEALTH CARE EDUCATION/TRAINING PROGRAM

## 2020-12-11 RX ORDER — DEXAMETHASONE 6 MG/1
6 TABLET ORAL
Qty: 5 TABLET | Refills: 0 | Status: SHIPPED | OUTPATIENT
Start: 2020-12-12 | End: 2020-12-17

## 2020-12-11 RX ORDER — BENZONATATE 100 MG/1
100 CAPSULE ORAL 3 TIMES DAILY PRN
Qty: 20 CAPSULE | Refills: 0 | Status: SHIPPED | OUTPATIENT
Start: 2020-12-11 | End: 2021-05-28

## 2020-12-11 RX ORDER — GUAIFENESIN 600 MG/1
1200 TABLET, EXTENDED RELEASE ORAL EVERY 12 HOURS SCHEDULED
Start: 2020-12-11 | End: 2021-05-28

## 2020-12-11 RX ORDER — ALBUTEROL SULFATE 90 UG/1
2 AEROSOL, METERED RESPIRATORY (INHALATION) EVERY 6 HOURS PRN
Qty: 6.7 G | Refills: 0 | Status: SHIPPED | OUTPATIENT
Start: 2020-12-11 | End: 2021-05-28

## 2020-12-11 RX ADMIN — METOPROLOL SUCCINATE 50 MG: 50 TABLET, EXTENDED RELEASE ORAL at 08:04

## 2020-12-11 RX ADMIN — DEXAMETHASONE 6 MG: 6 TABLET ORAL at 08:04

## 2020-12-11 RX ADMIN — ALBUTEROL SULFATE 2 PUFF: 90 AEROSOL, METERED RESPIRATORY (INHALATION) at 11:07

## 2020-12-11 RX ADMIN — ONDANSETRON HYDROCHLORIDE 4 MG: 4 TABLET, FILM COATED ORAL at 08:04

## 2020-12-11 RX ADMIN — KETOTIFEN FUMARATE 1 DROP: 0.35 SOLUTION/ DROPS OPHTHALMIC at 08:06

## 2020-12-11 RX ADMIN — LEVOTHYROXINE SODIUM 112 MCG: 0.11 TABLET ORAL at 08:04

## 2020-12-11 RX ADMIN — ACETAMINOPHEN 650 MG: 325 TABLET, FILM COATED ORAL at 11:40

## 2020-12-11 RX ADMIN — Medication 10 ML: at 08:05

## 2020-12-11 RX ADMIN — GUAIFENESIN 1200 MG: 600 TABLET, EXTENDED RELEASE ORAL at 08:04

## 2020-12-11 RX ADMIN — ALBUTEROL SULFATE 2 PUFF: 90 AEROSOL, METERED RESPIRATORY (INHALATION) at 06:57

## 2020-12-11 RX ADMIN — ENOXAPARIN SODIUM 40 MG: 40 INJECTION SUBCUTANEOUS at 08:04

## 2020-12-11 NOTE — DISCHARGE SUMMARY
[facility] Medicine Services  DISCHARGE SUMMARY        Prepared For PCP:  Dea Delaney APRN    Patient Name: Cinyd Thompson  : 1990  MRN: [id number]      Date of Admission:   2020    Date of Discharge:  2020    Length of stay:  LOS: 0 days     Hospital Course     Presenting Problem:   Hypoxia [R09.02]  Elevated LFTs [R79.89]  Pneumonia due to COVID-19 virus [U07.1, J12.89]      Active Hospital Problems    Diagnosis  POA   • **Pneumonia due to COVID-19 virus [U07.1, J12.89]  Yes   • Morbid obesity with BMI of 50.0-59.9, adult (CMS/[facility]) [E66.01, Z68.43]  Not Applicable   • Hypertension [I10]  Yes   • Learning disability [F81.9]  Yes   • Hypothyroidism [E03.9]  Yes      Resolved Hospital Problems   No resolved problems to display.           Hospital Course:  Cindy Thompson is a 30 y.o. female who presents to [facility] ED with a history of hypothyroidism, uterine cancer, and BEBA complaining of shortness of breath.  Patient states for the last few days she has become increasingly short of air.  She states she has had a fever of approximately 101 degrees at home with chills and a cough with increased sputum production. Patient Covid positive on 2020.  CT PE protocol shows negative for pulmonary embolus but moderately extensive areas of groundglass and airspace consolidation in the lungs consistent with pneumonia the distribution is nonspecific other pathology such as drug toxicity or connective tissue disorder can cause similar appearance and mild cardiomegaly.  Patient admitted for further evaluation and treatment.  Patient was started on dexamethasone.  She did require 1 to 2 L nasal cannula initially but then was weaned off to room air the next day.  She has remained afebrile and on room air for 24 hours, walking pulse ox done and patient does not need home oxygen.  She has clinically improved and wants to go home today.  Patient is stable to discharge home  today with follow-up with PCP as an outpatient.        Recommendation for Outpatient Providers:       Repeat chest x-ray in 1 week.      Reasons For Change In Medications and Indications for New Medications:    Dexamethasone for 5 more days.    Day of Discharge     HPI:   Patient seen examined the day of discharge.  She feels much better, breathing well on room air.  Wants to go home today.    Vital Signs:   Temp:  [96 °F (35.6 °C)-97.8 °F (36.6 °C)] 97.4 °F (36.3 °C)  Heart Rate:  [37-74] 74  Resp:  [15-21] 16  BP: ()/(58-74) 113/73     Physical Exam:  General: Awake, alert, morbidly obese female, NAD  Eyes: PERRL, EOMI, conjunctive are clear  Cardiovascular: Regular rate and rhythm, no murmurs  Respiratory: Clear to auscultation bilaterally, no wheezing or rales, unlabored breathing  Abdomen: Soft, nontender, positive bowel sounds, no guarding  Neurologic: A&O, CN grossly intact, moves all extremities spontaneously  Musculoskeletal: Normal range of motion, no deformities  Skin: Warm, dry, intact    Pertinent  and/or Most Recent Results     Results from last 7 days   Lab Units 12/11/20  0612 12/10/20  0329 12/09/20  2109   WBC 10*3/mm3 7.90 5.30 4.30   HEMOGLOBIN g/dL 12.3 12.4 13.6   HEMATOCRIT % 38.6 39.3 42.7   PLATELETS 10*3/mm3 273 215 206   SODIUM mmol/L 143  --  141   POTASSIUM mmol/L 4.3  --  3.6   CHLORIDE mmol/L 107  --  105   CO2 mmol/L 29.0  --  26.0   BUN mg/dL 9  --  7   CREATININE mg/dL 0.57  --  0.74   GLUCOSE mg/dL 155*  --  112*   CALCIUM mg/dL 9.2  --  9.0     Results from last 7 days   Lab Units 12/11/20 0612 12/09/20 2109   BILIRUBIN mg/dL 0.2 0.3   ALK PHOS U/L 69 83   ALT (SGPT) U/L 30 54*   AST (SGOT) U/L 16 45*           Invalid input(s): TG, LDLCALC, LDLREALC  Results from last 7 days   Lab Units 12/10/20  0328 12/09/20  2109   TSH uIU/mL 1.070  --    PROCALCITONIN ng/mL  --  0.20       Brief Urine Lab Results  (Last result in the past 365 days)      Color   Clarity   Blood   Leuk  Est   Nitrite   Protein   CREAT   Urine HCG        09/30/20 1033               Negative           Microbiology Results Abnormal     Procedure Component Value - Date/Time    Respiratory Panel, PCR (WITHOUT COVID) - Swab, Nasopharynx [[id number]]  (Normal) Collected: 12/10/20 0531    Lab Status: Final result Specimen: Swab from Nasopharynx Updated: 12/10/20 0735     ADENOVIRUS, PCR Not Detected     Coronavirus 229E Not Detected     Coronavirus HKU1 Not Detected     Coronavirus NL63 Not Detected     Coronavirus OC43 Not Detected     Human Metapneumovirus Not Detected     Human Rhinovirus/Enterovirus Not Detected     Influenza B PCR Not Detected     Parainfluenza Virus 1 Not Detected     Parainfluenza Virus 2 Not Detected     Parainfluenza Virus 3 Not Detected     Parainfluenza Virus 4 Not Detected     Bordetella pertussis pcr Not Detected     Chlamydophila pneumoniae PCR Not Detected     Mycoplasma pneumo by PCR Not Detected     Influenza A PCR Not Detected     RSV, PCR Not Detected     Bordetella parapertussis PCR Not Detected    Narrative:      The coronavirus on the RVP is NOT COVID-19 and is NOT indicative of infection with COVID-19.           Ct Abdomen Pelvis With Contrast    Result Date: 11/24/2020  Impression:  1. Ill-defined stranding within the lower pelvis surrounding the uterine cervix may be related to previous radiation therapy. No drainable fluid collection or abscess is seen. 2. Small fluid is seen within the endometrial canal. No focal well-defined endometrial lesion is seen on this examination. Markers, clips, or radiation implants are seen within the uterine cervix. 3. No evidence of bowel obstruction or active bowel inflammation. 4. No evidence of metastatic disease within the abdomen or pelvis. 5. Surgical changes of the stomach.    Electronically Signed By-Esperanza Linares MD On:11/24/2020 11:33 AM This report was finalized on [id number] by  Esperanza Linares MD.    Ct Chest Pulmonary  Embolism    Result Date: 12/9/2020  Impression: 1. Negative for pulmonary embolus. 2. There is moderately extensive areas of groundglass and airspace consolidation in the lungs. This is consistent with pneumonia. The distribution is not specific. Other pathology such as drug toxicity, or connective tissue disorder can cause similar appearance. 2. Mild cardiomegaly.  Electronically Signed By-Gabrielle Washington MD On:12/9/2020 10:32 PM This report was finalized on [id number] by  Gabrielle Washington MD.                Results for orders placed during the hospital encounter of 11/18/19   Adult Transthoracic Echo Complete W/ Cont if Necessary Per Protocol    Narrative TDS:  Overall LV/RV size and function normal  No significant valvulopathy seen in limited suboptimal views mainly   ascertained by Doppler assessment  IVC not seen  Overall valvular morphology is not well seen  Suboptimal study but grossly LV and RV function are normal                 Test Results Pending at Discharge        Procedures Performed           Consults:   Consults     Date and Time Order Name Status Description    12/9/2020 2313 Hospitalist (on-call MD unless specified) Completed             Discharge Details        Discharge Medications      New Medications      Instructions Start Date   albuterol sulfate  (90 Base) MCG/ACT inhaler  Commonly known as: PROVENTIL HFA;VENTOLIN HFA;PROAIR HFA   2 puffs, Inhalation, Every 6 Hours PRN      benzonatate 100 MG capsule  Commonly known as: TESSALON   100 mg, Oral, 3 Times Daily PRN      dexamethasone 6 MG tablet  Commonly known as: DECADRON   6 mg, Oral, Daily With Breakfast   Start Date: December 12, 2020     guaiFENesin 600 MG 12 hr tablet  Commonly known as: MUCINEX   1,200 mg, Oral, Every 12 Hours Scheduled         Continue These Medications      Instructions Start Date   acetaminophen 650 MG 8 hr tablet  Commonly known as: TYLENOL   650 mg, Oral, Every 8 Hours PRN      ibuprofen 200 MG  tablet  Commonly known as: ADVIL,MOTRIN   200 mg, Oral, Every 6 Hours PRN      levothyroxine 112 MCG tablet  Commonly known as: Synthroid   112 mcg, Oral, Daily      metoprolol succinate XL 50 MG 24 hr tablet  Commonly known as: TOPROL-XL   50 mg, Oral, Daily      olopatadine 0.1 % ophthalmic solution  Commonly known as: Pataday   1 drop, Ophthalmic, 2 Times Daily      ondansetron 8 MG tablet  Commonly known as: Zofran   8 mg, Oral, Every 8 Hours PRN      traMADol 50 MG tablet  Commonly known as: ULTRAM   50 mg, Oral, Every 12 Hours PRN, for pain             No Known Allergies      Discharge Disposition:  Home or Self Care    Diet:  Hospital:  Diet Order   Procedures   • Diet Regular         Discharge Activity:         CODE STATUS:    Code Status and Medical Interventions:   Ordered at: 12/10/20 0115     Code Status:    CPR     Medical Interventions (Level of Support Prior to Arrest):    Full         Follow-up Appointments  Future Appointments   Date Time Provider Department Center   1/22/2021  3:00 PM Marsha Massey, [facility] None             Condition on Discharge:      Stable      This patient has been examined wearing appropriate Personal Protective Equipment on 12/11/20      Electronically signed by Jasson Fabian DO, 12/11/20, 10:11 AM EST.      Time: I spent  26  minutes on this discharge activity which included face-to-face encounter with the patient/reviewing the data in the system/coordination of the care with the nursing staff as well as consultants/documentation/entering orders.

## 2020-12-11 NOTE — PROGRESS NOTES
Exercise Oximetry    Patient Name:Cindy Thompson   MRN: [id number]   Date: 12/11/20             ROOM AIR BASELINE   SpO2% 96% at rest in bed   Heart Rate    Blood Pressure      EXERCISE ON ROOM AIR SpO2% EXERCISE ON O2 @  LPM SpO2%   1 MINUTE 92 1 MINUTE    2 MINUTES 97 2 MINUTES    3 MINUTES 99  3 MINUTES    4 MINUTES 93 4 MINUTES    5 MINUTES 93 5 MINUTES    6 MINUTES 92 6 MINUTES               Distance Walked   Distance Walked   Dyspnea (Jackelin Scale)   Dyspnea (Jackelin Scale)   Fatigue (Jackelin Scale) Fatigue (Jackelin Scale)   SpO2% Post Exercise   SpO2% Post Exercise   HR Post Exercise   HR Post Exercise   Time to Recovery   Time to Recovery     Comments:  Pt was at rest in bed, I placed her on room air with saturation of 96% after 5 min. at rest, once walking at bed side in room pt complained of dizziness at minute 3 I had her slow down et hold onto the bed rail dizziness rated a 6 on a 1-10 scale, pt. Continued to feel dizzy but continued walking at a moderate pace until the end of the test.

## 2020-12-11 NOTE — PLAN OF CARE
Goal Outcome Evaluation:  Plan of Care Reviewed With: patient  Progress: improving  Outcome Summary: RA. Discharge pending

## 2020-12-11 NOTE — PLAN OF CARE
Problem: Adult Inpatient Plan of Care  Goal: Plan of Care Review  Outcome: Ongoing, Progressing  Flowsheets  Taken 12/11/2020 0350 by Gal Pearson, RN  Plan of Care Reviewed With: patient  Taken 12/10/2020 0259 by Dea Arteaga, RN  Outcome Summary: Pt tested positive for covid 12/5. Increasing shortness of breath. Currently on 2L NC. Will receive steroids. VSS. Will continue to monitor.   Goal Outcome Evaluation:  Plan of Care Reviewed With: patient  Progress: no change

## 2020-12-12 ENCOUNTER — TRANSITIONAL CARE MANAGEMENT TELEPHONE ENCOUNTER (OUTPATIENT)
Dept: CALL CENTER | Facility: HOSPITAL | Age: 30
End: 2020-12-12

## 2020-12-12 NOTE — OUTREACH NOTE
Prep Survey      Responses   [facility] facility patient discharged from?  [place]   Is LACE score < 7 ?  No   Eligibility  [facility]   Hospital  [place]   Date of Admission  12/09/20   Date of Discharge  12/11/20   Discharge Disposition  Home or Self Care   Discharge diagnosis  hypoxia & PNA d/t COVID-19   Does the patient have one of the following disease processes/diagnoses(primary or secondary)?  COVID-19   Does the patient have Home health ordered?  No   Is there a DME ordered?  No   Prep survey completed?  Yes          Aleyda Pisano RN

## 2020-12-12 NOTE — OUTREACH NOTE
Call Center TCM Note      Responses   [facility], [place] patient discharged from?  [place]   Does the patient have one of the following disease processes/diagnoses(primary or secondary)?  COVID-19   COVID-19 underlying condition?  None   TCM attempt successful?  Yes   Call start time  1057   Call end time  1059   Discharge diagnosis  hypoxia & PNA d/t COVID-19   Meds reviewed with patient/caregiver?  Yes   Is the patient having any side effects they believe may be caused by any medication additions or changes?  No   Does the patient have all medications ordered at discharge?  Yes   Is the patient taking all medications as directed (includes completed medication regime)?  Yes   Does the patient have a primary care provider?   Yes   Comments regarding PCP  patient will call to make a f/u appt once she feels better   Does the patient have an appointment with their PCP or specialist within 7 days of discharge?  No   Nursing Interventions  Advised patient to make appointment   Has the patient kept scheduled appointments due by today?  N/A   Psychosocial issues?  No   Did the patient receive a copy of their discharge instructions?  Yes   Did the patient receive a copy of COVID-19 specific instructions?  Yes   Nursing interventions  Reviewed instructions with patient   What is the patient's perception of their health status since discharge?  Improving   Does the patient have any of the following symptoms?  None   Nursing Interventions  Nurse provided patient education   Pulse Ox monitoring  None [does not have an O2 sensor at home]   Is the patient/caregiver able to teach back steps to recovery at home?  Set small, achievable goals for return to baseline health, Rest and rebuild strength, gradually increase activity   Is the patient/caregiver able to teach back the hierarchy of who to call/visit for symptoms/problems? PCP, Specialist, Home health nurse, Urgent Care, ED, 911  Yes   TCM call completed?  Yes   Wrap up additional  comments  States she is doing better, a little short of breath but improving, no questions or concerns at this time.          Kerri Batista RN    12/12/2020, 10:59 EST

## 2020-12-13 ENCOUNTER — READMISSION MANAGEMENT (OUTPATIENT)
Dept: CALL CENTER | Facility: HOSPITAL | Age: 30
End: 2020-12-13

## 2020-12-13 NOTE — OUTREACH NOTE
COVID-19 Week 1 Survey      Responses   [facility] patient discharged from?  [place]   Does the patient have one of the following disease processes/diagnoses(primary or secondary)?  COVID-19   COVID-19 underlying condition?  None   Call Number  Call 2   Week 1 Call successful?  Yes   Call start time  1148   Call end time  1149   Discharge diagnosis  hypoxia & PNA d/t COVID-19   Person spoke with today (if not patient) and relationship  Jan   Meds reviewed with patient/caregiver?  Yes   Is the patient having any side effects they believe may be caused by any medication additions or changes?  No   Does the patient have all medications ordered at discharge?  Yes   Is the patient taking all medications as directed (includes completed medication regime)?  Yes   Does the patient have a primary care provider?   Yes   Comments regarding PCP  patient will call to make a f/u appt once she feels better   Does the patient have an appointment with their PCP or specialist within 7 days of discharge?  Yes   Psychosocial issues?  No   Did the patient receive a copy of their discharge instructions?  Yes   Did the patient receive a copy of COVID-19 specific instructions?  Yes   Nursing interventions  Reviewed instructions with patient   What is the patient's perception of their health status since discharge?  Improving   Does the patient have any of the following symptoms?  None   Pulse Ox monitoring  None   Is the patient/caregiver able to teach back steps to recovery at home?  Set small, achievable goals for return to baseline health, Rest and rebuild strength, gradually increase activity, Eat a well-balance diet   Is the patient/caregiver able to teach back the hierarchy of who to call/visit for symptoms/problems? PCP, Specialist, Home health nurse, Urgent Care, ED, 911  Yes   COVID-19 call completed?  Yes   Wrap up additional comments  spouse states patient is doing well.  She is resting during this call.  He denies any  symptoms at this time.  Denies needs during this call.           Elvira Veliz RN

## 2020-12-14 ENCOUNTER — READMISSION MANAGEMENT (OUTPATIENT)
Dept: CALL CENTER | Facility: HOSPITAL | Age: 30
End: 2020-12-14

## 2020-12-14 NOTE — OUTREACH NOTE
COVID-19 Week 1 Survey      Responses   [facility], [place] patient discharged from?  [place]   Does the patient have one of the following disease processes/diagnoses(primary or secondary)?  COVID-19   COVID-19 underlying condition?  None   Call Number  Call 3   Week 1 Call successful?  Yes   Call start time  1025   Call end time  1028   Discharge diagnosis  hypoxia & PNA d/t COVID-19   Is the patient taking all medications as directed (includes completed medication regime)?  Yes   Does the patient have an appointment with their PCP or specialist within 7 days of discharge?  No   What is preventing the patient from scheduling follow up appointments within 7 days of discharge?  Haven't had time   Nursing Interventions  Verified appointment date/time/provider, Educated patient on importance of making appointment   Has the patient kept scheduled appointments due by today?  N/A   Has home health visited the patient within 72 hours of discharge?  N/A   Psychosocial issues?  No   What is the patient's perception of their health status since discharge?  Improving   Does the patient have any of the following symptoms?  Cough   Nursing Interventions  Nurse provided patient education   Pulse Ox monitoring  None   Is the patient/caregiver able to teach back steps to recovery at home?  Rest and rebuild strength, gradually increase activity, Set small, achievable goals for return to baseline health   Is the patient/caregiver able to teach back the hierarchy of who to call/visit for symptoms/problems? PCP, Specialist, Home health nurse, Urgent Care, ED, 911  Yes   Wrap up additional comments  Pt sattes she is doing well andf has a cough. She may go out to drugstore and buy a pulse ox. Enc her to f/u with her PCP          Thea Rosenthal RN

## 2020-12-14 NOTE — PROGRESS NOTES
Case Management Discharge Note                Selected Continued Care - Discharged on 12/11/2020 Admission date: 12/9/2020 - Discharge disposition: Home or Self Care     Final Discharge Disposition Code: 01 - home or self-care

## 2020-12-15 ENCOUNTER — TELEMEDICINE (OUTPATIENT)
Dept: FAMILY MEDICINE CLINIC | Facility: CLINIC | Age: 30
End: 2020-12-15

## 2020-12-15 DIAGNOSIS — U07.1 COVID-19: Primary | ICD-10-CM

## 2020-12-15 PROCEDURE — 99213 OFFICE O/P EST LOW 20 MIN: CPT | Performed by: NURSE PRACTITIONER

## 2020-12-15 NOTE — PROGRESS NOTES
Subjective   Cindy Thompson is a 30 y.o. female.     You have chosen to receive care through a telehealth visit.  Do you consent to use a video/audio connection for your medical care today? Yes    Pt is following up via video visit on her recent covid infection.  She was diagnosed on 12/5 after going to the ER due to an exposure.  She reports that she initially had a HA and loss of taste and smell.  She also had some chills.   She was discharged home.   She states that she developed SOA and cough and went back to [facility] on 12/9.  She was found to have PNA on a CT scan and was given dexamethasone.   She was discharged on 12/11 and was told to complete 5 more days of the steroid.  She reports that she is feeling good at this time.  Denies any CP, SOA, fever, or chills.   She was told to have a repeat CXR 1 week after discharge.  She has no complaints at this time.        The following portions of the patient's history were reviewed and updated as appropriate: allergies, current medications, past family history, past medical history, past social history, past surgical history and problem list.    Review of Systems   Constitutional: Negative for appetite change, chills, fatigue and fever.   HENT: Negative for congestion, ear pain, hearing loss, postnasal drip, rhinorrhea, sinus pressure, sore throat, swollen glands and trouble swallowing.    Respiratory: Negative for cough, chest tightness, shortness of breath and wheezing.    Cardiovascular: Negative for chest pain and palpitations.   Gastrointestinal: Negative for abdominal pain.   Neurological: Negative for dizziness, weakness, numbness and headache.       Objective   [facility] 12/09/2020   Physical Exam  Constitutional:       Appearance: Normal appearance. She is obese.   HENT:      Head: Normocephalic and atraumatic.   Pulmonary:      Effort: Pulmonary effort is normal. No respiratory distress.   Neurological:      General: No focal deficit present.      Mental Status: She is  alert and oriented to person, place, and time.   Psychiatric:         Mood and Affect: Mood normal.         Behavior: Behavior normal.         Thought Content: Thought content normal.         Judgment: Judgment normal.           Assessment/Plan     Diagnoses and all orders for this visit:    1. COVID-19 (Primary)  Comments:  resolved  symptoms improved  finish dexamethason  repeat CXR 1 wk per hospital recommendation  Orders:  -     XR Chest PA & Lateral; Future

## 2020-12-18 ENCOUNTER — READMISSION MANAGEMENT (OUTPATIENT)
Dept: CALL CENTER | Facility: HOSPITAL | Age: 30
End: 2020-12-18

## 2020-12-18 ENCOUNTER — HOSPITAL ENCOUNTER (OUTPATIENT)
Dept: GENERAL RADIOLOGY | Facility: HOSPITAL | Age: 30
Discharge: HOME | End: 2020-12-18
Admitting: NURSE PRACTITIONER

## 2020-12-18 DIAGNOSIS — U07.1 COVID-19: ICD-10-CM

## 2020-12-18 PROCEDURE — 71046 X-RAY EXAM CHEST 2 VIEWS: CPT

## 2020-12-18 NOTE — OUTREACH NOTE
COVID-19 Week 2 Survey      Responses   [facility] patient discharged from?  [place]   Does the patient have one of the following disease processes/diagnoses(primary or secondary)?  COVID-19   COVID-19 underlying condition?  None   Call Number  Call 1   COVID-19 Week 2: Call 1 attempt successful?  Yes   Call start time  1008   Call end time  1011   Meds reviewed with patient/caregiver?  Yes   Is the patient having any side effects they believe may be caused by any medication additions or changes?  No   Does the patient have all medications ordered at discharge?  Yes   Is the patient taking all medications as directed (includes completed medication regime)?  Yes   Does the patient have a primary care provider?   Yes   Has the patient kept scheduled appointments due by today?  Yes   Comments  States had chest xray today-waiting for results.   Has home health visited the patient within 72 hours of discharge?  N/A   Psychosocial issues?  No   Did the patient receive a copy of their discharge instructions?  Yes   Did the patient receive a copy of COVID-19 specific instructions?  Yes   What is the patient's perception of their health status since discharge?  Improving   Does the patient have any of the following symptoms?  None   Nursing Interventions  Nurse provided patient education   Pulse Ox monitoring  None   If the patient is a current smoker, are they able to teach back resources for cessation?  Not a smoker   Is the patient/caregiver able to teach back the hierarchy of who to call/visit for symptoms/problems? PCP, Specialist, Home health nurse, Urgent Care, ED, 911  Yes   COVID-19 call completed?  Yes   Wrap up additional comments  States is doing well-reports cough has resolved. Denies any needs today.          Heidi Malagon RN

## 2020-12-21 ENCOUNTER — READMISSION MANAGEMENT (OUTPATIENT)
Dept: CALL CENTER | Facility: HOSPITAL | Age: 30
End: 2020-12-21

## 2020-12-21 NOTE — OUTREACH NOTE
COVID-19 Week 2 Survey      Responses   [facility] patient discharged from?  [place]   Does the patient have one of the following disease processes/diagnoses(primary or secondary)?  COVID-19   COVID-19 underlying condition?  None   Call Number  Call 2   COVID-19 Week 2: Call 1 attempt successful?  Yes   Call start time  0847   Call end time  0848   Discharge diagnosis  hypoxia & PNA d/t COVID-19   Meds reviewed with patient/caregiver?  Yes   Is the patient taking all medications as directed (includes completed medication regime)?  Yes   Comments regarding PCP  12/15/20   Has the patient kept scheduled appointments due by today?  Yes   What is the patient's perception of their health status since discharge?  Improving   Does the patient have any of the following symptoms?  Loss of taste/smell   Nursing Interventions  Nurse provided patient education   Pulse Ox monitoring  None   Is the patient/caregiver able to teach back steps to recovery at home?  Rest and rebuild strength, gradually increase activity, Eat a well-balance diet   COVID-19 call completed?  Yes          Hali Mariscal RN

## 2020-12-28 ENCOUNTER — READMISSION MANAGEMENT (OUTPATIENT)
Dept: CALL CENTER | Facility: HOSPITAL | Age: 30
End: 2020-12-28

## 2020-12-28 NOTE — OUTREACH NOTE
COVID-19 Week 3 Survey      Responses   [facility] patient discharged from?  [place]   Does the patient have one of the following disease processes/diagnoses(primary or secondary)?  COVID-19   COVID-19 underlying condition?  None   Call Number  Call 1   COVID-19 Week 3: Call 1 attempt successful?  Yes   Call end time  0841   Discharge diagnosis  hypoxia & PNA d/t COVID-19   What is the patient's perception of their health status since discharge?  Improving   Does the patient have any of the following symptoms?  None   Pulse Ox monitoring  None   COVID-19 call completed?  Yes   Wrap up additional comments  Doing well.  Denies covid symptoms at this time.          Abigail Kemp, RN

## 2020-12-31 ENCOUNTER — APPOINTMENT (OUTPATIENT)
Dept: GENERAL RADIOLOGY | Facility: HOSPITAL | Age: 30
End: 2020-12-31

## 2020-12-31 ENCOUNTER — HOSPITAL ENCOUNTER (EMERGENCY)
Facility: HOSPITAL | Age: 30
Discharge: HOME | End: 2020-12-31
Admitting: EMERGENCY MEDICINE

## 2020-12-31 VITALS
HEIGHT: 64 IN | DIASTOLIC BLOOD PRESSURE: 90 MMHG | HEART RATE: 102 BPM | BODY MASS INDEX: 49.49 KG/M2 | OXYGEN SATURATION: 93 % | RESPIRATION RATE: 16 BRPM | WEIGHT: 289.9 LBS | TEMPERATURE: 97.6 F | SYSTOLIC BLOOD PRESSURE: 122 MMHG

## 2020-12-31 DIAGNOSIS — S80.01XA CONTUSION OF RIGHT KNEE, INITIAL ENCOUNTER: ICD-10-CM
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
## 2020-12-31 DIAGNOSIS — S92.425A CLOSED NONDISPLACED FRACTURE OF DISTAL PHALANX OF LEFT GREAT TOE, INITIAL ENCOUNTER: ICD-10-CM

## 2020-12-31 DIAGNOSIS — M25.561 ACUTE PAIN OF RIGHT KNEE: ICD-10-CM

## 2020-12-31 PROCEDURE — 73562 X-RAY EXAM OF KNEE 3: CPT

## 2020-12-31 PROCEDURE — 99283 EMERGENCY DEPT VISIT LOW MDM: CPT

## 2020-12-31 PROCEDURE — 73660 X-RAY EXAM OF TOE(S): CPT

## 2021-01-08 ENCOUNTER — OFFICE (OUTPATIENT)
Dept: URBAN - METROPOLITAN AREA CLINIC 64 | Facility: CLINIC | Age: 31
End: 2021-01-08

## 2021-01-08 VITALS
DIASTOLIC BLOOD PRESSURE: 88 MMHG | SYSTOLIC BLOOD PRESSURE: 134 MMHG | HEART RATE: 74 BPM | WEIGHT: 293 LBS | HEIGHT: 64 IN

## 2021-01-08 DIAGNOSIS — K59.00 CONSTIPATION, UNSPECIFIED: ICD-10-CM

## 2021-01-08 DIAGNOSIS — R10.9 UNSPECIFIED ABDOMINAL PAIN: ICD-10-CM

## 2021-01-08 PROCEDURE — 99204 OFFICE O/P NEW MOD 45 MIN: CPT | Performed by: NURSE PRACTITIONER

## 2021-01-08 RX ORDER — SORBITOL SOLUTION 70 %
SOLUTION, ORAL MISCELLANEOUS
Qty: 100 | Refills: 0 | Status: ACTIVE
Start: 2021-01-08

## 2021-01-08 RX ORDER — MAGNESIUM CITRATE 1.75 G/29.6ML
LIQUID ORAL
Qty: 1 | Refills: 0 | Status: ACTIVE
Start: 2021-01-08

## 2021-01-20 ENCOUNTER — OFFICE VISIT (OUTPATIENT)
Dept: BARIATRICS/WEIGHT MGMT | Facility: CLINIC | Age: 31
End: 2021-01-20

## 2021-01-20 DIAGNOSIS — E66.01 OBESITY, CLASS III, BMI 40-49.9 (MORBID OBESITY) (HCC): Primary | ICD-10-CM

## 2021-01-20 PROCEDURE — 99443 PR PHYS/QHP TELEPHONE EVALUATION 21-30 MIN: CPT | Performed by: NURSE PRACTITIONER

## 2021-01-20 NOTE — PROGRESS NOTES
[facility]  [address] [place] [address]  [address] [place] [address]  Dept: [phone]  1/20/2021      Cindy Thompson.  [id number]  [id number]  1990  female    You have chosen to receive care through a telephone call. Do you consent to use a telephone call for your medical care today? Yes    [facility] Post-Op Bariatric Surgery:   Cindy Thompson is status post procedure listed above  HPI:     Wt Readings from Last 10 Encounters:   12/31/20 132 kg (289 lb 14.5 oz)   12/11/20 132 kg (292 lb 1.8 oz)   12/05/20 134 kg (296 lb 1.2 oz)   12/03/20 127 kg (280 lb)   11/24/20 134 kg (296 lb)   11/05/20 (!) 138 kg (304 lb 6.4 oz)   10/05/20 (!) 140 kg (309 lb 6.4 oz)   09/30/20 (!) 144 kg (317 lb 0.3 oz)   09/25/20 (!) 147 kg (323 lb)   09/23/20 (!) 146 kg (321 lb)    289 pounds    Today's weight is 289   pounds, today's BMI is 49.76 @ has a  loss of 0 pounds since the last visit and@ weight loss since surgery is 34 pounds. The patient reports a decreased portion size and loss of appetite.      Cindy Thompson denies nausea or vomiting/ GERD     Diet and Exercise: Diet history reviewed and discussed with the patient. Weight loss/gains to date discussed with the patient. The patient states they are eating 30 grams of protein per day. She reports eating 2 meals per day, a typical portion size of 1/2  cup, eating 0 snacks per day, drinking 6 or more 8-oz. glasses of water per day, no carbonated beverage consumption and exercising regularly.     Breakfast: none recently   Lunch: eggs  Dinner: spaghetti   Snacks: none   Drinks: apple juice and water and milk , no carbonation  Exercise: walking sometimes 30 minutes at a time         Supplements: none.   No multivitamin     Review of Systems   Constitutional: Positive for activity change and appetite change.   Respiratory: Negative.    Cardiovascular: Negative.    Gastrointestinal: Negative.     Musculoskeletal: Positive for back pain.       Patient Active Problem List   Diagnosis   • Uterine cancer (CMS/HCC)   • Hypertension   • Depression   • Learning disability   • Abdominal pain   • Abnormal urinalysis   • Arthropathy   • B12 deficiency   • Former smoker   • Headache, migraine   • Hypothyroidism   • Microscopic hematuria   • Obesity   • Oligomenorrhea   • Other general symptoms and signs   • Stage 1 chronic kidney disease   • Tooth disorder   • Sore throat   • Pre-operative exam   • Morbid obesity with BMI of 50.0-59.9, adult (CMS/HCC)   • Pneumonia due to COVID-19 virus       Past Medical History:   Diagnosis Date   • Anesthesia complication     lips swollen after surgeries, ? mouth gaurd, has trouble with respirations during surgery   • Depression    • Disease of thyroid gland    • Hypertension    • Learning disabilities    • Low back pain    • Obesity    • BEBA (obstructive sleep apnea)     uses CPAP   • Snoring    • Uterine cancer (CMS/HCC)     [facility]- Dr. Eva Belle       The following portions of the patient's history were reviewed and updated as appropriate: allergies, current medications, past family history, past medical history, past social history, past surgical history and problem list.    Height 64 inches, weight 289 pounds, BMI 49.76    Physical Exam  Cardiovascular:      Rate and Rhythm: Normal rate and regular rhythm.      Comments: Per patient  Pulmonary:      Effort: Pulmonary effort is normal.      Breath sounds: Normal breath sounds.      Comments: Per patient  Abdominal:      General: Abdomen is flat. Bowel sounds are normal.      Palpations: Abdomen is soft.      Comments: Per patient   Neurological:      General: No focal deficit present.      Mental Status: She is alert and oriented to person, place, and time.   Psychiatric:         Mood and Affect: Mood normal.         Behavior: Behavior normal.         Thought Content: Thought content normal.         Judgment: Judgment  normal.     limited due to telephone call     Assessment:   Post-op, the patient is not loosing any weight. She is not getting her protein in and is not exercising. Encourage pt to get 60-80 grams of protein in a day and exercise 20-30 minutes 2-3 days a week including strength training.  She is also not taking a MVI daily. Encourage daily multi vitamin also. Pt also encouraged to follow up with [facility] for dietary counseling.     Pt had COVID 19 and developed pneumonia after this. Pt states she is feeling better now but still very tired.     Plan:     Encouraged patient to be sure to get plenty of lean protein per day through small frequent meals all with a protein source.   Activity restrictions: none.   Recommended patient be sure to get at least 70 grams of protein per day by eating small, frequent meals all with high lean protein choices. Be sure to limit/cut back on daily carbohydrate intake. Discussed with the patient the recommended amount of water per day to intake- half of body weight in ounces. Reviewed vitamin requirements. Be sure to do routine exercise, 150 minutes per week minimum, including both cardio and strength training.     Instructions / Recommendations: dietary counseling recommended, recommended a daily protein intake of  grams, vitamin supplement(s) recommended, recommended exercising at least 150 minutes per week, behavior modifications recommended and instructed to call the office for concerns, questions, or problems.     The patient was instructed to follow up in 3 months.     The patient was counseled regarding. Total time spent face to face was 30 minutes and 25 minutes was spent counseling.     Marsha Massey APRERIN  [facility]

## 2021-01-28 VITALS — BODY MASS INDEX: 49.34 KG/M2 | HEIGHT: 64 IN | WEIGHT: 289 LBS

## 2021-03-08 ENCOUNTER — APPOINTMENT (OUTPATIENT)
Dept: GENERAL RADIOLOGY | Facility: HOSPITAL | Age: 31
End: 2021-03-08

## 2021-03-08 ENCOUNTER — HOSPITAL ENCOUNTER (EMERGENCY)
Facility: HOSPITAL | Age: 31
Discharge: HOME | End: 2021-03-08
Admitting: EMERGENCY MEDICINE

## 2021-03-08 VITALS
DIASTOLIC BLOOD PRESSURE: 88 MMHG | TEMPERATURE: 98 F | RESPIRATION RATE: 18 BRPM | BODY MASS INDEX: 49.51 KG/M2 | HEIGHT: 64 IN | OXYGEN SATURATION: 97 % | SYSTOLIC BLOOD PRESSURE: 130 MMHG | WEIGHT: 290 LBS | HEART RATE: 106 BPM

## 2021-03-08 DIAGNOSIS — R07.9 RIGHT-SIDED CHEST PAIN: ICD-10-CM

## 2021-03-08 DIAGNOSIS — M79.621 PAIN IN RIGHT UPPER ARM: Primary | ICD-10-CM

## 2021-03-08 LAB
ALBUMIN SERPL-MCNC: 4 G/DL (ref 3.5–5.2)
ALBUMIN/GLOB SERPL: 1.3 G/DL
ALP SERPL-CCNC: 84 U/L (ref 39–117)
ALT SERPL W P-5'-P-CCNC: 14 U/L (ref 1–33)
ANION GAP SERPL CALCULATED.3IONS-SCNC: 14 MMOL/L (ref 5–15)
AST SERPL-CCNC: 16 U/L (ref 1–32)
BASOPHILS # BLD AUTO: 0.1 10*3/MM3 (ref 0–0.2)
BASOPHILS NFR BLD AUTO: 0.8 % (ref 0–1.5)
BILIRUB SERPL-MCNC: 0.3 MG/DL (ref 0–1.2)
BUN SERPL-MCNC: 8 MG/DL (ref 6–20)
BUN/CREAT SERPL: 11.4 (ref 7–25)
CALCIUM SPEC-SCNC: 9.3 MG/DL (ref 8.6–10.5)
CHLORIDE SERPL-SCNC: 103 MMOL/L (ref 98–107)
CO2 SERPL-SCNC: 22 MMOL/L (ref 22–29)
CREAT SERPL-MCNC: 0.7 MG/DL (ref 0.57–1)
DEPRECATED RDW RBC AUTO: 47.7 FL (ref 37–54)
EOSINOPHIL # BLD AUTO: 0.2 10*3/MM3 (ref 0–0.4)
EOSINOPHIL NFR BLD AUTO: 1.4 % (ref 0.3–6.2)
ERYTHROCYTE [DISTWIDTH] IN BLOOD BY AUTOMATED COUNT: 16.2 % (ref 12.3–15.4)
ETHANOL UR QL: 0.09 %
GFR SERPL CREATININE-BSD FRML MDRD: 100 ML/MIN/1.73
GLOBULIN UR ELPH-MCNC: 3 GM/DL
GLUCOSE SERPL-MCNC: 138 MG/DL (ref 65–99)
HCT VFR BLD AUTO: 38.3 % (ref 34–46.6)
HGB BLD-MCNC: 13 G/DL (ref 12–15.9)
LIPASE SERPL-CCNC: 18 U/L (ref 13–60)
LYMPHOCYTES # BLD AUTO: 1 10*3/MM3 (ref 0.7–3.1)
LYMPHOCYTES NFR BLD AUTO: 9.3 % (ref 19.6–45.3)
MCH RBC QN AUTO: 27.8 PG (ref 26.6–33)
MCHC RBC AUTO-ENTMCNC: 33.9 G/DL (ref 31.5–35.7)
MCV RBC AUTO: 82 FL (ref 79–97)
MONOCYTES # BLD AUTO: 0.5 10*3/MM3 (ref 0.1–0.9)
MONOCYTES NFR BLD AUTO: 4.4 % (ref 5–12)
NEUTROPHILS NFR BLD AUTO: 84.1 % (ref 42.7–76)
NEUTROPHILS NFR BLD AUTO: 9.3 10*3/MM3 (ref 1.7–7)
NRBC BLD AUTO-RTO: 0.1 /100 WBC (ref 0–0.2)
NT-PROBNP SERPL-MCNC: 14.5 PG/ML (ref 0–450)
PLATELET # BLD AUTO: 353 10*3/MM3 (ref 140–450)
PMV BLD AUTO: 6.1 FL (ref 6–12)
POTASSIUM SERPL-SCNC: 3.1 MMOL/L (ref 3.5–5.2)
PROT SERPL-MCNC: 7 G/DL (ref 6–8.5)
RBC # BLD AUTO: 4.67 10*6/MM3 (ref 3.77–5.28)
SODIUM SERPL-SCNC: 139 MMOL/L (ref 136–145)
TROPONIN T SERPL-MCNC: <0.01 NG/ML (ref 0–0.03)
WBC # BLD AUTO: 11.1 10*3/MM3 (ref 3.4–10.8)
WHOLE BLOOD HOLD SPECIMEN: NORMAL

## 2021-03-08 PROCEDURE — 84484 ASSAY OF TROPONIN QUANT: CPT | Performed by: PHYSICIAN ASSISTANT

## 2021-03-08 PROCEDURE — 93005 ELECTROCARDIOGRAM TRACING: CPT

## 2021-03-08 PROCEDURE — 80053 COMPREHEN METABOLIC PANEL: CPT | Performed by: PHYSICIAN ASSISTANT

## 2021-03-08 PROCEDURE — 82077 ASSAY SPEC XCP UR&BREATH IA: CPT | Performed by: PHYSICIAN ASSISTANT

## 2021-03-08 PROCEDURE — 99283 EMERGENCY DEPT VISIT LOW MDM: CPT

## 2021-03-08 PROCEDURE — 71045 X-RAY EXAM CHEST 1 VIEW: CPT

## 2021-03-08 PROCEDURE — 96374 THER/PROPH/DIAG INJ IV PUSH: CPT

## 2021-03-08 PROCEDURE — 83690 ASSAY OF LIPASE: CPT | Performed by: PHYSICIAN ASSISTANT

## 2021-03-08 PROCEDURE — 85025 COMPLETE CBC W/AUTO DIFF WBC: CPT | Performed by: PHYSICIAN ASSISTANT

## 2021-03-08 PROCEDURE — 83880 ASSAY OF NATRIURETIC PEPTIDE: CPT | Performed by: PHYSICIAN ASSISTANT

## 2021-03-08 PROCEDURE — 25010000002 KETOROLAC TROMETHAMINE PER 15 MG: Performed by: PHYSICIAN ASSISTANT

## 2021-03-08 RX ORDER — LIDOCAINE 50 MG/G
1 PATCH TOPICAL ONCE
Status: DISCONTINUED | OUTPATIENT
Start: 2021-03-08 | End: 2021-03-09 | Stop reason: HOSPADM

## 2021-03-08 RX ORDER — KETOROLAC TROMETHAMINE 15 MG/ML
15 INJECTION, SOLUTION INTRAMUSCULAR; INTRAVENOUS ONCE
Status: COMPLETED | OUTPATIENT
Start: 2021-03-08 | End: 2021-03-08

## 2021-03-08 RX ORDER — SODIUM CHLORIDE 0.9 % (FLUSH) 0.9 %
10 SYRINGE (ML) INJECTION AS NEEDED
Status: DISCONTINUED | OUTPATIENT
Start: 2021-03-08 | End: 2021-03-09 | Stop reason: HOSPADM

## 2021-03-08 RX ADMIN — KETOROLAC TROMETHAMINE 15 MG: 15 INJECTION, SOLUTION INTRAMUSCULAR; INTRAVENOUS at 21:13

## 2021-03-08 RX ADMIN — LIDOCAINE 1 PATCH: 50 PATCH TOPICAL at 21:13

## 2021-03-08 RX ADMIN — SODIUM CHLORIDE 500 ML: 9 INJECTION, SOLUTION INTRAVENOUS at 21:10

## 2021-03-09 NOTE — ED PROVIDER NOTES
Subjective   History:  Patient is a 30-year-old female who presents to the ER with complaints of right-sided chest pain and upper arm pain she reports earlier tonight she was having stomach pain so she started drinking alcohol and taking shots to help her stomach pain and then she had sharp right-sided chest pain and upper arm pain took Motrin without relief.  She reports in the past she has had episodes where she has been drinking alcohol she has had right upper arm pain and right-sided chest pain.  But it is always gone away on its own and she is never needed to be evaluated in the ER for it.  Denies any nausea vomiting neck pain left arm pain no current stomach pain.  No inciting injury no numbness tingling.  The pain is not reproducible.    Onset: 1 day  Location: Right-sided chest and right upper arm  Duration: Constant  Character: Sharp pain   Aggravating/Alleviating factors: None  Radiation none  Severity: Moderate            Review of Systems   Constitutional: Negative for chills, diaphoresis, fatigue and fever.   HENT: Negative.    Respiratory: Negative for cough, choking and shortness of breath.    Cardiovascular: Positive for chest pain. Negative for palpitations and leg swelling.   Gastrointestinal: Negative for abdominal pain, nausea and vomiting.   Genitourinary: Negative.    Musculoskeletal:        Right upper arm pain   Skin: Negative.    Neurological: Negative.    Hematological: Negative.    Psychiatric/Behavioral: Negative.        Past Medical History:   Diagnosis Date   • Anesthesia complication     lips swollen after surgeries, ? mouth gaurd, has trouble with respirations during surgery   • Depression    • Disease of thyroid gland    • Hypertension    • Learning disabilities    • Low back pain    • Obesity    • BEBA (obstructive sleep apnea)     uses CPAP   • Snoring    • Uterine cancer (CMS/HCC)     [facility]- Dr. Eva Belle       No Known Allergies    Past Surgical History:   Procedure  Laterality Date   • DILATATION AND CURETTAGE     • ENDOSCOPY N/A 2020    Procedure: ESOPHAGOGASTRODUODENOSCOPY WITH BIOPSY,;  Surgeon: Elle Bill MD;  Location: [facility] ENDOSCOPY;  Service: General;  Laterality: N/A;   • GASTRIC SLEEVE LAPAROSCOPIC N/A 2020    Procedure: GASTRIC SLEEVE LAPAROSCOPIC;  Surgeon: Elle Bill MD;  Location: [facility] MAIN OR;  Service: Bariatric;  Laterality: N/A;       Family History   Problem Relation Age of Onset   • Cancer Father    • Hyperlipidemia Father    • Stroke Father    • Arthritis Father         N/A   • Depression Sister         N/A   • Learning disabilities Sister    • Diabetes Maternal Grandmother    • Cancer Maternal Grandmother         N/A   • Heart attack Maternal Grandfather    • Heart disease Maternal Grandfather    • Cancer Paternal Grandmother         Bone Cancer   • Emphysema Paternal Grandfather    • Arthritis Mother         N/A   • Early death Brother         He  in his crib   • Miscarriages / Stillbirths Sister         Miscarriages       Social History     Socioeconomic History   • Marital status:      Spouse name: Not on file   • Number of children: Not on file   • Years of education: Not on file   • Highest education level: Not on file   Tobacco Use   • Smoking status: Former Smoker     Packs/day: 0.00     Years: 0.00     Pack years: 0.00     Types: Cigarettes     Quit date:      Years since quittin.1   • Smokeless tobacco: Never Used   Vaping Use   • Vaping Use: Never used   Substance and Sexual Activity   • Alcohol use: No     Comment: used to drink weekly   • Drug use: No   • Sexual activity: Defer     Partners: Male     Birth control/protection: None           Objective   Physical Exam  Vitals reviewed.   Constitutional:       Appearance: She is well-developed.   HENT:      Head: Normocephalic and atraumatic.   Eyes:      Pupils: Pupils are equal, round, and reactive to light.   Cardiovascular:      Rate and Rhythm: Normal rate  and regular rhythm.   Pulmonary:      Effort: Pulmonary effort is normal.      Breath sounds: Normal breath sounds. No decreased breath sounds, wheezing, rhonchi or rales.   Chest:      Chest wall: No mass, deformity, tenderness or crepitus.   Musculoskeletal:         General: Normal range of motion.      Cervical back: Normal range of motion.   Skin:     General: Skin is warm and dry.   Neurological:      General: No focal deficit present.      Mental Status: She is alert and oriented to person, place, and time.   Psychiatric:         Mood and Affect: Mood is anxious.         Behavior: Behavior normal.         Procedures           ED Course  ED Course as of Mar 08 2221   Mon Mar 08, 2021   2113 EKG interpreted by ER physician reviewed by myself.  Sinus tachycardia rate of 108 nonspecific T wave abnormalities in anterior leads.  No significant change from previous.    [MG]      ED Course User Index  [MG] Cindy Bee PA-C          XR Chest 1 View    Result Date: 3/8/2021    1.  Stable cardiomegaly.  No acute cardiac pulmonary disease.   Electronically Signed By-Baltazar Pratt MD On:3/8/2021 9:27 PM This report was finalized on [id number] by  Baltazar Pratt MD.    Labs Reviewed   COMPREHENSIVE METABOLIC PANEL - Abnormal; Notable for the following components:       Result Value    Glucose 138 (*)     Potassium 3.1 (*)     All other components within normal limits    Narrative:     GFR Normal >60  Chronic Kidney Disease <60  Kidney Failure <15     CBC WITH AUTO DIFFERENTIAL - Abnormal; Notable for the following components:    WBC 11.10 (*)     RDW 16.2 (*)     Neutrophil % 84.1 (*)     Lymphocyte % 9.3 (*)     Monocyte % 4.4 (*)     Neutrophils, Absolute 9.30 (*)     All other components within normal limits   LIPASE - Normal   TROPONIN (IN-HOUSE) - Normal    Narrative:     Troponin T Reference Range:  <= 0.03 ng/mL-   Negative for AMI  >0.03 ng/mL-     Abnormal for myocardial necrosis.  Clinicians would have to  utilize clinical acumen, EKG, Troponin and serial changes to determine if it is an Acute Myocardial Infarction or myocardial injury due to an underlying chronic condition.       Results may be falsely decreased if patient taking Biotin.     BNP (IN-HOUSE) - Normal    Narrative:     Among patients with dyspnea, NT-proBNP is highly sensitive for the detection of acute congestive heart failure. In addition NT-proBNP of <300 pg/ml effectively rules out acute congestive heart failure with 99% negative predictive value.    Results may be falsely decreased if patient taking Biotin.     ETHANOL    Narrative:     Plasma Ethanol Clinical Symptoms:    ETOH (%)               Clinical Symptom  .01-.05              No apparent influence  .03-.12              Euphoria, Diminished judgment and attention   .09-.25              Impaired comprehension, Muscle incoordination  .18-.30              Confusion, Staggered gait, Slurred speech  .25-.40              Markedly decreased response to stimuli, unable to stand or                        walk, vomitting, sleep or stupor  .35-.50              Comatose, Anesthesia, Subnormal body temperature       CBC AND DIFFERENTIAL    Narrative:     The following orders were created for panel order CBC & Differential.  Procedure                               Abnormality         Status                     ---------                               -----------         ------                     CBC Auto Differential[[id number]]        Abnormal            Final result                 Please view results for these tests on the individual orders.   EXTRA TUBES    Narrative:     The following orders were created for panel order Extra Tubes.  Procedure                               Abnormality         Status                     ---------                               -----------         ------                     Light Blue Top[[id number]]                                   Final result                 Please  view results for these tests on the individual orders.   LIGHT BLUE TOP     Medications   sodium chloride 0.9 % flush 10 mL (has no administration in time range)   lidocaine (LIDODERM) 5 % 1 patch (1 patch Transdermal Medication Applied 3/8/21 2113)   sodium chloride 0.9 % bolus 500 mL (500 mL Intravenous New Bag 3/8/21 2110)   ketorolac (TORADOL) injection 15 mg (15 mg Intravenous Given 3/8/21 2113)                                       MDM  Number of Diagnoses or Management Options  Pain in right upper arm  Right-sided chest pain  Diagnosis management comments: I examined the patient using the appropriate personal protective equipment.      DISPOSITION:   Chart Review:  Comorbidity:  has a past medical history of Anesthesia complication, Depression, Disease of thyroid gland, Hypertension, Learning disabilities, Low back pain, Obesity, BEBA (obstructive sleep apnea), Snoring, and Uterine cancer (CMS/HCC).  Imaging: Was interpreted by physician and reviewed by myself:  XR Chest 1 View    Result Date: 3/8/2021    1.  Stable cardiomegaly.  No acute cardiac pulmonary disease.   Electronically Signed By-Baltazar Pratt MD On:3/8/2021 9:27 PM This report was finalized on [id number] by  Baltazar Pratt MD.      Disposition/Treatment:    Patient is 30-year-old female presents to the ER after she was reinitiated right-sided chest pain right upper arm pain she was given Toradol and lidocaine patch with complete resolution of her pain.  Patient's lab work was fairly unremarkable although she had a slightly decrease in potassium at 3.1.  Patient was told to follow-up with her primary care provider for recheck.  She was stable at time of discharge return precaution follow-up return provided she was in agreement plan.    Discussed with the patient her drinking habits she said that after her uterine cancer and radiation and chemotherapy she has had lower abdominal pain.  She has had several work-ups for this and they have not  found anything.  She medicates with alcohol which is more effective than narcotic pain medication.       Amount and/or Complexity of Data Reviewed  Clinical lab tests: reviewed  Tests in the radiology section of CPT®: reviewed  Tests in the medicine section of CPT®: reviewed    Patient Progress  Patient progress: stable      Final diagnoses:   Pain in right upper arm   Right-sided chest pain            Cindy Bee PA-C  03/08/21 [id number]

## 2021-03-09 NOTE — ED NOTES
Pt c/o right sided chest pain with radiation into the right arm x30 minutes. Denies shortness of breath, dizziness or previous cardiac hx       Consuelo Vyas RN  03/08/21 6936

## 2021-03-09 NOTE — DISCHARGE INSTRUCTIONS
Return to the ER for any worsening symptoms.  Follow-up with your primary care provider tomorrow for further work-up and management

## 2021-03-11 LAB — QT INTERVAL: 329 MS

## 2021-03-27 ENCOUNTER — APPOINTMENT (OUTPATIENT)
Dept: GENERAL RADIOLOGY | Facility: HOSPITAL | Age: 31
End: 2021-03-27

## 2021-03-27 ENCOUNTER — HOSPITAL ENCOUNTER (EMERGENCY)
Facility: HOSPITAL | Age: 31
Discharge: HOME | End: 2021-03-27
Attending: EMERGENCY MEDICINE | Admitting: EMERGENCY MEDICINE

## 2021-03-27 VITALS
DIASTOLIC BLOOD PRESSURE: 78 MMHG | WEIGHT: 290 LBS | TEMPERATURE: 98.5 F | HEIGHT: 64 IN | RESPIRATION RATE: 16 BRPM | SYSTOLIC BLOOD PRESSURE: 114 MMHG | HEART RATE: 90 BPM | BODY MASS INDEX: 49.51 KG/M2 | OXYGEN SATURATION: 100 %

## 2021-03-27 DIAGNOSIS — M79.601 RIGHT ARM PAIN: Primary | ICD-10-CM

## 2021-03-27 DIAGNOSIS — F10.10 ALCOHOL ABUSE: ICD-10-CM

## 2021-03-27 LAB
ALBUMIN SERPL-MCNC: 4 G/DL (ref 3.5–5.2)
ALBUMIN/GLOB SERPL: 1.5 G/DL
ALP SERPL-CCNC: 81 U/L (ref 39–117)
ALT SERPL W P-5'-P-CCNC: 15 U/L (ref 1–33)
ANION GAP SERPL CALCULATED.3IONS-SCNC: 15 MMOL/L (ref 5–15)
AST SERPL-CCNC: 15 U/L (ref 1–32)
BASOPHILS # BLD AUTO: 0 10*3/MM3 (ref 0–0.2)
BASOPHILS NFR BLD AUTO: 0.5 % (ref 0–1.5)
BILIRUB SERPL-MCNC: 0.2 MG/DL (ref 0–1.2)
BUN SERPL-MCNC: 10 MG/DL (ref 6–20)
BUN/CREAT SERPL: 15.2 (ref 7–25)
CALCIUM SPEC-SCNC: 9.3 MG/DL (ref 8.6–10.5)
CHLORIDE SERPL-SCNC: 105 MMOL/L (ref 98–107)
CO2 SERPL-SCNC: 22 MMOL/L (ref 22–29)
CREAT SERPL-MCNC: 0.66 MG/DL (ref 0.57–1)
DEPRECATED RDW RBC AUTO: 45.5 FL (ref 37–54)
EOSINOPHIL # BLD AUTO: 0.2 10*3/MM3 (ref 0–0.4)
EOSINOPHIL NFR BLD AUTO: 2.5 % (ref 0.3–6.2)
ERYTHROCYTE [DISTWIDTH] IN BLOOD BY AUTOMATED COUNT: 15.6 % (ref 12.3–15.4)
ETHANOL UR QL: 0.1 %
GFR SERPL CREATININE-BSD FRML MDRD: 107 ML/MIN/1.73
GLOBULIN UR ELPH-MCNC: 2.6 GM/DL
GLUCOSE SERPL-MCNC: 117 MG/DL (ref 65–99)
HCT VFR BLD AUTO: 38.3 % (ref 34–46.6)
HGB BLD-MCNC: 12.8 G/DL (ref 12–15.9)
LYMPHOCYTES # BLD AUTO: 1 10*3/MM3 (ref 0.7–3.1)
LYMPHOCYTES NFR BLD AUTO: 15.1 % (ref 19.6–45.3)
MCH RBC QN AUTO: 27.5 PG (ref 26.6–33)
MCHC RBC AUTO-ENTMCNC: 33.5 G/DL (ref 31.5–35.7)
MCV RBC AUTO: 82 FL (ref 79–97)
MONOCYTES # BLD AUTO: 0.4 10*3/MM3 (ref 0.1–0.9)
MONOCYTES NFR BLD AUTO: 6.2 % (ref 5–12)
NEUTROPHILS NFR BLD AUTO: 4.8 10*3/MM3 (ref 1.7–7)
NEUTROPHILS NFR BLD AUTO: 75.7 % (ref 42.7–76)
NRBC BLD AUTO-RTO: 0 /100 WBC (ref 0–0.2)
PLATELET # BLD AUTO: 271 10*3/MM3 (ref 140–450)
PMV BLD AUTO: 6.5 FL (ref 6–12)
POTASSIUM SERPL-SCNC: 3.6 MMOL/L (ref 3.5–5.2)
PROT SERPL-MCNC: 6.6 G/DL (ref 6–8.5)
RBC # BLD AUTO: 4.67 10*6/MM3 (ref 3.77–5.28)
SODIUM SERPL-SCNC: 142 MMOL/L (ref 136–145)
WBC # BLD AUTO: 6.3 10*3/MM3 (ref 3.4–10.8)

## 2021-03-27 PROCEDURE — 80053 COMPREHEN METABOLIC PANEL: CPT | Performed by: EMERGENCY MEDICINE

## 2021-03-27 PROCEDURE — 99283 EMERGENCY DEPT VISIT LOW MDM: CPT

## 2021-03-27 PROCEDURE — 85025 COMPLETE CBC W/AUTO DIFF WBC: CPT | Performed by: EMERGENCY MEDICINE

## 2021-03-27 PROCEDURE — 73060 X-RAY EXAM OF HUMERUS: CPT

## 2021-03-27 PROCEDURE — 82077 ASSAY SPEC XCP UR&BREATH IA: CPT | Performed by: EMERGENCY MEDICINE

## 2021-04-02 DIAGNOSIS — E03.9 HYPOTHYROIDISM, UNSPECIFIED TYPE: ICD-10-CM

## 2021-04-02 RX ORDER — LEVOTHYROXINE SODIUM 0.1 MG/1
100 TABLET ORAL DAILY
Qty: 90 TABLET | OUTPATIENT
Start: 2021-04-02

## 2021-04-02 RX ORDER — LEVOTHYROXINE SODIUM 112 UG/1
112 TABLET ORAL DAILY
Qty: 30 TABLET | Refills: 0 | Status: SHIPPED | OUTPATIENT
Start: 2021-04-02 | End: 2021-04-02

## 2021-04-02 RX ORDER — LEVOTHYROXINE SODIUM 112 UG/1
112 TABLET ORAL DAILY
Qty: 90 TABLET | Refills: 0 | Status: SHIPPED | OUTPATIENT
Start: 2021-04-02 | End: 2021-05-03 | Stop reason: SDUPTHER

## 2021-04-09 ENCOUNTER — TELEPHONE (OUTPATIENT)
Dept: FAMILY MEDICINE CLINIC | Facility: CLINIC | Age: 31
End: 2021-04-09

## 2021-04-09 NOTE — TELEPHONE ENCOUNTER
Caller: Cindy Thompson    Relationship to patient: Self    Best call back number: [phone]    Chief complaint: STITCH REMOVAL      Additional notes:PATIENT HAS 8 STITCHES IN LEFT ARM. SHE GOT THEM ON 04/04 BUT THE ER DIDN'T TELL HER WHEN TO HAVE THEM REMOVED.    PLEASE CALL AND SCHEDULE.

## 2021-04-13 ENCOUNTER — OFFICE VISIT (OUTPATIENT)
Dept: FAMILY MEDICINE CLINIC | Facility: CLINIC | Age: 31
End: 2021-04-13

## 2021-04-13 VITALS
HEART RATE: 69 BPM | WEIGHT: 293 LBS | OXYGEN SATURATION: 97 % | SYSTOLIC BLOOD PRESSURE: 126 MMHG | DIASTOLIC BLOOD PRESSURE: 86 MMHG | TEMPERATURE: 97.1 F | BODY MASS INDEX: 50.64 KG/M2

## 2021-04-13 DIAGNOSIS — S41.112A LACERATION OF LEFT UPPER EXTREMITY, INITIAL ENCOUNTER: Primary | ICD-10-CM

## 2021-04-13 DIAGNOSIS — F33.2 SEVERE EPISODE OF RECURRENT MAJOR DEPRESSIVE DISORDER, WITHOUT PSYCHOTIC FEATURES (HCC): ICD-10-CM

## 2021-04-13 PROCEDURE — 99214 OFFICE O/P EST MOD 30 MIN: CPT | Performed by: NURSE PRACTITIONER

## 2021-04-13 RX ORDER — ESCITALOPRAM OXALATE 10 MG/1
10 TABLET ORAL DAILY
COMMUNITY
End: 2021-11-19

## 2021-04-13 RX ORDER — GABAPENTIN 400 MG/1
400 CAPSULE ORAL 3 TIMES DAILY
COMMUNITY
End: 2021-11-19

## 2021-04-13 RX ORDER — LAMOTRIGINE 25 MG/1
25 TABLET ORAL 2 TIMES DAILY
COMMUNITY
End: 2021-11-19

## 2021-04-13 NOTE — PROGRESS NOTES
Subjective   Cindy Thompson is a 30 y.o. female.     Pt is here today for a suture removal on left arm.  She states that on 4/4 she cut herself purposefully as she was drunk.  She went to [facility] and had sutures placed.  She checked herself into [facility] after that for suicidal ideation and self harm.   She was admitted for 1 wk.  She was started on gabapentin, lamictal, and lexapro for bipolar disorder.   She has only been on he meds about 1 week.  Her mood has improved some.  She will [facility]  Denies SI or HI.  Pt states that she started drinking daily about 1 mo ago.   Last time she drank was on Friday.   Has some dizziness, but not bad.  She received 8 sutures.   Denies fever or signs of infection.         The following portions of the patient's history were reviewed and updated as appropriate: allergies, current medications, past family history, past medical history, past social history, past surgical history and problem list.    Review of Systems   Constitutional: Negative for appetite change, chills, fatigue and fever.   Respiratory: Negative for cough, chest tightness, shortness of breath and wheezing.    Cardiovascular: Negative for chest pain and palpitations.   Skin: Negative for rash and skin lesions.   Neurological: Positive for dizziness. Negative for weakness, numbness and headache.   Psychiatric/Behavioral: Positive for depressed mood. Negative for self-injury and suicidal ideas.       Objective   /86 (BP Location: Right arm, Patient Position: Sitting, Cuff Size: Adult)   Pulse 69   Temp 97.1 °F (36.2 °C) (Tympanic)   Wt 134 kg (295 lb)   SpO2 97%   BMI 50.64 kg/m²     Physical Exam  Constitutional:       Appearance: Normal appearance. She is obese.   HENT:      Head: Normocephalic and atraumatic.   Cardiovascular:      Rate and Rhythm: Normal rate and regular rhythm.      Heart sounds: No murmur heard.     Pulmonary:      Effort: Pulmonary effort is  normal. No respiratory distress.      Breath sounds: Normal breath sounds.   Musculoskeletal:         General: Normal range of motion.   Skin:     General: Skin is warm and dry.      Findings: Lesion (8 sutures removed from left wrist- wound slightly opened but healing well. steristrips applied. ) present.   Neurological:      General: No focal deficit present.      Mental Status: She is alert and oriented to person, place, and time.   Psychiatric:         Mood and Affect: Mood normal.         Behavior: Behavior normal.         Thought Content: Thought content normal.         Judgment: Judgment normal.           Assessment/Plan     Diagnoses and all orders for this visit:    1. Laceration of left upper extremity, initial encounter (Primary)  Comments:  sutures plaved at [facility]  8 sutures removed today  tolerated well  no s/s of infection  steri strips applied  wash with water and mild soap  educ on s/s inf  Orders:  -     Suture Removal    2. Severe episode of recurrent major depressive disorder, without psychotic features (CMS/HCC)  Comments:  chronic issue  recent self harm and [facility] admission  doing well at this time  denies SI or HI  f/u psych        Suture Removal    Date/Time: 4/13/2021 3:19 PM  Performed by: Dea Delaney APRN  Authorized by: Dea Delaney APRN   Body area: upper extremity  Location details: left lower arm  Wound Appearance: clean  Sutures Removed: 8  Post-removal: Steri-Strips applied  Patient tolerance: patient tolerated the procedure well with no immediate complications

## 2021-04-13 NOTE — PATIENT INSTRUCTIONS
Keep wound clean and dry  Call for signs of infection- redness, warmth, drainage, fever  Follow up with counseling  Call for issues or concerns

## 2021-05-02 DIAGNOSIS — E03.9 HYPOTHYROIDISM, UNSPECIFIED TYPE: ICD-10-CM

## 2021-05-02 RX ORDER — LEVOTHYROXINE SODIUM 0.1 MG/1
100 TABLET ORAL DAILY
Qty: 30 TABLET | Refills: 2 | Status: CANCELLED | OUTPATIENT
Start: 2021-05-02

## 2021-05-03 RX ORDER — LEVOTHYROXINE SODIUM 112 UG/1
112 TABLET ORAL DAILY
Qty: 90 TABLET | Refills: 0 | Status: SHIPPED | OUTPATIENT
Start: 2021-05-03 | End: 2021-11-19

## 2021-05-03 RX ORDER — LEVOTHYROXINE SODIUM 0.1 MG/1
100 TABLET ORAL DAILY
Qty: 30 TABLET | Refills: 2 | OUTPATIENT
Start: 2021-05-03

## 2021-05-27 NOTE — PROGRESS NOTES
Subjective   Cindy Thompson is a 30 y.o. female.     Patient is here today for 1 month follow-up on bipolar disorder, anxiety, and depression.  She had an episode when she was intoxicated where she tried slitting her wrists.  She spent some time in [facility].  She will be seeing a psychiatrist and therapist [facility] in June.  She is currently on Lexapro 10 mg daily, gabapentin 400 mg 3 times a day, Lamictal 25 mg daily.  Patient reports that she is doing well on the medication.  Denies any SI or HI.  Has occasional dizziness.  She has not drank alcohol in over a month.   She is feeling better overall.   No issues or concerns at this time.        The following portions of the patient's history were reviewed and updated as appropriate: allergies, current medications, past family history, past medical history, past social history, past surgical history and problem list.    Review of Systems   Constitutional: Negative for chills, fatigue and fever.   Respiratory: Negative for chest tightness and shortness of breath.    Cardiovascular: Negative for chest pain and palpitations.   Gastrointestinal: Negative for abdominal pain, nausea and vomiting.   Neurological: Positive for dizziness. Negative for headache.   Psychiatric/Behavioral: Negative for self-injury, suicidal ideas, depressed mood and stress. The patient is not nervous/anxious.        Objective   /84 (BP Location: Left arm, Patient Position: Sitting, Cuff Size: Adult)   Pulse 90   Temp 97.8 °F (36.6 °C) (Tympanic)   Wt 133 kg (294 lb)   SpO2 94%   BMI 50.46 kg/m²   Physical Exam  Constitutional:       Appearance: Normal appearance. She is obese.   HENT:      Head: Normocephalic and atraumatic.   Eyes:      Extraocular Movements: Extraocular movements intact.   Pulmonary:      Effort: Pulmonary effort is normal. No respiratory distress.   Musculoskeletal:         General: Normal range of motion.   Skin:     General: Skin is warm and dry.    Neurological:      General: No focal deficit present.      Mental Status: She is alert and oriented to person, place, and time.   Psychiatric:         Mood and Affect: Mood normal.         Behavior: Behavior normal.         Thought Content: Thought content normal.         Judgment: Judgment normal.           Assessment/Plan     Diagnoses and all orders for this visit:    1. Severe episode of recurrent major depressive disorder, without psychotic features (CMS/HCC) (Primary)  Comments:  improved  cont gabapentin, lamictal, and lexapro  has appt with psych in june through [facility]  denies SI or HI  denies feel down or hopeless

## 2021-05-28 ENCOUNTER — OFFICE VISIT (OUTPATIENT)
Dept: FAMILY MEDICINE CLINIC | Facility: CLINIC | Age: 31
End: 2021-05-28

## 2021-05-28 VITALS
BODY MASS INDEX: 50.46 KG/M2 | SYSTOLIC BLOOD PRESSURE: 117 MMHG | WEIGHT: 293 LBS | OXYGEN SATURATION: 94 % | HEART RATE: 90 BPM | DIASTOLIC BLOOD PRESSURE: 84 MMHG | TEMPERATURE: 97.8 F

## 2021-05-28 DIAGNOSIS — F33.2 SEVERE EPISODE OF RECURRENT MAJOR DEPRESSIVE DISORDER, WITHOUT PSYCHOTIC FEATURES (HCC): Primary | ICD-10-CM

## 2021-05-28 PROCEDURE — 99213 OFFICE O/P EST LOW 20 MIN: CPT | Performed by: NURSE PRACTITIONER

## 2021-06-20 ENCOUNTER — HOSPITAL ENCOUNTER (EMERGENCY)
Facility: HOSPITAL | Age: 31
Discharge: HOME | End: 2021-06-20
Attending: EMERGENCY MEDICINE | Admitting: EMERGENCY MEDICINE

## 2021-06-20 VITALS
TEMPERATURE: 97.5 F | SYSTOLIC BLOOD PRESSURE: 169 MMHG | RESPIRATION RATE: 18 BRPM | WEIGHT: 293 LBS | DIASTOLIC BLOOD PRESSURE: 77 MMHG | HEART RATE: 70 BPM | HEIGHT: 64 IN | OXYGEN SATURATION: 98 % | BODY MASS INDEX: 50.02 KG/M2

## 2021-06-20 DIAGNOSIS — J02.9 VIRAL PHARYNGITIS: Primary | ICD-10-CM

## 2021-06-20 DIAGNOSIS — L55.9 SUNBURN: ICD-10-CM

## 2021-06-20 LAB — S PYO AG THROAT QL: NEGATIVE

## 2021-06-20 PROCEDURE — 87651 STREP A DNA AMP PROBE: CPT | Performed by: EMERGENCY MEDICINE

## 2021-06-20 PROCEDURE — 99283 EMERGENCY DEPT VISIT LOW MDM: CPT

## 2021-06-20 NOTE — DISCHARGE INSTRUCTIONS
Rest, drink plenty of fluids, Tylenol or Motrin for discomfort.  Return for increased pain, fever, shortness of breath or any other concerns.  Consider daily antihistamine

## 2021-06-20 NOTE — ED PROVIDER NOTES
Subjective   History of Present Illness  Sore throat  30-year-old female complains sore throat sunburn.  She reports no fevers.  States [place]  She reports no shortness of breath or significant cough or vomiting.  She reports no known ill contacts.  Reports scratchy sore throat tonight.  Review of Systems   Constitutional: Positive for chills. Negative for fever.   HENT: Positive for sore throat.    Eyes: Negative.    Respiratory: Negative.    Cardiovascular: Negative.    Gastrointestinal: Negative.    Genitourinary: Negative.    Musculoskeletal: Negative.    Skin: Negative.    Neurological: Negative.    Psychiatric/Behavioral: Negative.        Past Medical History:   Diagnosis Date   • Anesthesia complication     lips swollen after surgeries, ? mouth gaurd, has trouble with respirations during surgery   • Depression    • Disease of thyroid gland    • Hypertension    • Learning disabilities    • Low back pain    • Obesity    • BEBA (obstructive sleep apnea)     uses CPAP   • Snoring    • Uterine cancer (CMS/HCC)     [facility]- Dr. Eva Belle       No Known Allergies    Past Surgical History:   Procedure Laterality Date   • DILATATION AND CURETTAGE     • ENDOSCOPY N/A 2/21/2020    Procedure: ESOPHAGOGASTRODUODENOSCOPY WITH BIOPSY,;  Surgeon: Elle Bill MD;  Location: [facility] ENDOSCOPY;  Service: General;  Laterality: N/A;   • GASTRIC SLEEVE LAPAROSCOPIC N/A 9/30/2020    Procedure: GASTRIC SLEEVE LAPAROSCOPIC;  Surgeon: Elle Bill MD;  Location: [facility] MAIN OR;  Service: Bariatric;  Laterality: N/A;       Family History   Problem Relation Age of Onset   • Cancer Father    • Hyperlipidemia Father    • Stroke Father    • Arthritis Father         N/A   • Depression Sister         N/A   • Learning disabilities Sister    • Diabetes Maternal Grandmother    • Cancer Maternal Grandmother         N/A   • Heart attack Maternal Grandfather    • Heart  disease Maternal Grandfather    • Cancer Paternal Grandmother         Bone Cancer   • Emphysema Paternal Grandfather    • Arthritis Mother         N/A   • Early death Brother         He  in his crib   • Miscarriages / Stillbirths Sister         Miscarriages       Social History     Socioeconomic History   • Marital status:      Spouse name: Not on file   • Number of children: Not on file   • Years of education: Not on file   • Highest education level: Not on file   Tobacco Use   • Smoking status: Former Smoker     Packs/day: 0.00     Years: 0.00     Pack years: 0.00     Types: Cigarettes     Quit date:      Years since quittin.4   • Smokeless tobacco: Never Used   Vaping Use   • Vaping Use: Never used   Substance and Sexual Activity   • Alcohol use: No     Comment: used to drink weekly   • Drug use: No   • Sexual activity: Defer     Partners: Male     Birth control/protection: None     Prior to Admission medications    Medication Sig Start Date End Date Taking? Authorizing Provider   acetaminophen (TYLENOL) 650 MG 8 hr tablet Take 650 mg by mouth Every 8 (Eight) Hours As Needed. 19   Emergency, Nurse Noemi, RN   escitalopram (LEXAPRO) 10 MG tablet Take 10 mg by mouth Daily.    ProviderSebas MD   gabapentin (NEURONTIN) 400 MG capsule Take 400 mg by mouth 3 (Three) Times a Day.    ProviderSebas MD   ibuprofen (ADVIL,MOTRIN) 200 MG tablet Take 200 mg by mouth Every 6 (Six) Hours As Needed for Mild Pain .    ProviderSebas MD   lamoTRIgine (LaMICtal) 25 MG tablet Take 25 mg by mouth 2 (Two) Times a Day.    ProviderSebas MD   levothyroxine (SYNTHROID, LEVOTHROID) 112 MCG tablet Take 1 tablet by mouth Daily. 5/3/21   Dea Delaney APRN   metoprolol succinate XL (TOPROL-XL) 50 MG 24 hr tablet TAKE 1 TABLET BY MOUTH DAILY 9/10/20   Dea Delaney APRN     BP (!) 140/101 (BP Location: Left arm, Patient Position: Sitting)   Pulse 109   Temp 98.6 °F (37 °C)  "(Oral)   Resp 16   Ht 162.6 cm (64\")   Wt 134 kg (295 lb 10.2 oz)   LMP  (Approximate)   SpO2 96%   Breastfeeding No   BMI 50.75 kg/m²   I examined the patient using the appropriate personal protective equipment.          Objective   Physical Exam  General: Well-developed well-appearing, no acute distress, alert and appropriate  Eyes:  sclera nonicteric  HEENT: Mucous membranes moist, no mucosal swelling, no significant erythema, no tonsillomegaly or exudate  Neck: Supple, no nuchal rigidity, no lymphadenopathy  Respirations: Respirations nonlabored, equal breath sounds bilaterally, clear lungs  Heart regular rate and rhythm, no murmurs rubs or gallops,   Abdomen soft nontender nondistended, no hepatosplenomegaly, no hernia, no mass, normal bowel sounds, no CVA tenderness  Extremities no clubbing cyanosis or edema, calves are symmetric and nontender  Neuro cranial nerves grossly intact, no focal limb deficits  Psych oriented, pleasant affect  Skin mild sunburn, no skin breakdown  Procedures           ED Course            Results for orders placed or performed during the hospital encounter of 06/20/21   Rapid Strep A Screen - Swab, Throat    Specimen: Throat; Swab   Result Value Ref Range    Strep A Ag Negative Negative                                       MDM  Patient well-appearing without signs of systemic toxicity.  Her strep screen was negative.  She is in no respiratory distress.  She has mild sunburn she is discharged good condition was given warning signs for return.  Final diagnoses:   Viral pharyngitis   Sunburn       ED Disposition  ED Disposition     ED Disposition Condition Comment    Discharge Stable           Dea Delaney, APRN  [address]  [facility] [place] [facility] [address] [phone]    Schedule an appointment as soon as possible for a visit in 3 days           Medication List      No changes were made to your prescriptions during this visit.          Sd Curtis, " MD  06/20/21 8602

## 2021-07-19 ENCOUNTER — HOSPITAL ENCOUNTER (EMERGENCY)
Facility: HOSPITAL | Age: 31
Discharge: HOME | End: 2021-07-19
Admitting: EMERGENCY MEDICINE

## 2021-07-19 ENCOUNTER — APPOINTMENT (OUTPATIENT)
Dept: GENERAL RADIOLOGY | Facility: HOSPITAL | Age: 31
End: 2021-07-19

## 2021-07-19 VITALS
OXYGEN SATURATION: 98 % | WEIGHT: 293 LBS | HEIGHT: 64 IN | RESPIRATION RATE: 18 BRPM | BODY MASS INDEX: 50.02 KG/M2 | SYSTOLIC BLOOD PRESSURE: 122 MMHG | DIASTOLIC BLOOD PRESSURE: 87 MMHG | HEART RATE: 77 BPM | TEMPERATURE: 98.9 F

## 2021-07-19 DIAGNOSIS — S46.911A STRAIN OF RIGHT SHOULDER, INITIAL ENCOUNTER: ICD-10-CM

## 2021-07-19 DIAGNOSIS — M79.601 RIGHT ARM PAIN: Primary | ICD-10-CM

## 2021-07-19 DIAGNOSIS — E66.01 MORBID OBESITY (HCC): ICD-10-CM

## 2021-07-19 PROCEDURE — 73030 X-RAY EXAM OF SHOULDER: CPT

## 2021-07-19 PROCEDURE — 99283 EMERGENCY DEPT VISIT LOW MDM: CPT

## 2021-07-19 RX ORDER — IBUPROFEN 600 MG/1
600 TABLET ORAL ONCE
Status: COMPLETED | OUTPATIENT
Start: 2021-07-19 | End: 2021-07-19

## 2021-07-19 RX ORDER — METHOCARBAMOL 500 MG/1
500 TABLET, FILM COATED ORAL ONCE
Status: COMPLETED | OUTPATIENT
Start: 2021-07-19 | End: 2021-07-19

## 2021-07-19 RX ORDER — DICLOFENAC SODIUM 75 MG/1
75 TABLET, DELAYED RELEASE ORAL 2 TIMES DAILY PRN
Qty: 10 TABLET | Refills: 0 | Status: SHIPPED | OUTPATIENT
Start: 2021-07-19 | End: 2021-07-24

## 2021-07-19 RX ADMIN — METHOCARBAMOL 500 MG: 500 TABLET, FILM COATED ORAL at 21:08

## 2021-07-19 RX ADMIN — IBUPROFEN 600 MG: 600 TABLET, FILM COATED ORAL at 21:08

## 2021-07-20 NOTE — ED NOTES
Pt reports she has been told she has an extra bone in her R arm     Renetta Hernandez RN  07/19/21 2017

## 2021-07-20 NOTE — ED PROVIDER NOTES
Subjective   Patient is a 30-year-old morbidly obese female who presents with right shoulder pain.  She states it has been hurting for about 3 weeks.  She states that she did not do anything to injure it.  She rates the pain a 6/10 she states is worse with movement.  He denies any numbness or tingling she denies any neck pain.          Review of Systems   Constitutional: Negative for chills, fatigue and fever.   HENT: Negative for congestion, tinnitus and trouble swallowing.    Eyes: Negative for photophobia, discharge and redness.   Respiratory: Negative for cough and shortness of breath.    Cardiovascular: Negative for chest pain and palpitations.   Gastrointestinal: Negative for abdominal pain, diarrhea, nausea and vomiting.   Genitourinary: Negative for dysuria, frequency and urgency.   Musculoskeletal: Negative for back pain, joint swelling and myalgias.        Shoulder pain   Skin: Negative for rash.   Neurological: Negative for dizziness and headaches.   Psychiatric/Behavioral: Negative for confusion.   All other systems reviewed and are negative.      Past Medical History:   Diagnosis Date   • Anesthesia complication     lips swollen after surgeries, ? mouth gaurd, has trouble with respirations during surgery   • Depression    • Disease of thyroid gland    • Hypertension    • Learning disabilities    • Low back pain    • Obesity    • BEBA (obstructive sleep apnea)     uses CPAP   • Snoring    • Uterine cancer (CMS/HCC)     [facility]- Dr. Eva Belle       No Known Allergies    Past Surgical History:   Procedure Laterality Date   • DILATATION AND CURETTAGE     • ENDOSCOPY N/A 2/21/2020    Procedure: ESOPHAGOGASTRODUODENOSCOPY WITH BIOPSY,;  Surgeon: Elle Bill MD;  Location: [facility] ENDOSCOPY;  Service: General;  Laterality: N/A;   • GASTRIC SLEEVE LAPAROSCOPIC N/A 9/30/2020    Procedure: GASTRIC SLEEVE LAPAROSCOPIC;  Surgeon: Elle Bill MD;  Location: [facility] MAIN OR;  Service: Bariatric;  Laterality:  N/A;       Family History   Problem Relation Age of Onset   • Cancer Father    • Hyperlipidemia Father    • Stroke Father    • Arthritis Father         N/A   • Depression Sister         N/A   • Learning disabilities Sister    • Diabetes Maternal Grandmother    • Cancer Maternal Grandmother         N/A   • Heart attack Maternal Grandfather    • Heart disease Maternal Grandfather    • Cancer Paternal Grandmother         Bone Cancer   • Emphysema Paternal Grandfather    • Arthritis Mother         N/A   • Early death Brother         He  in his crib   • Miscarriages / Stillbirths Sister         Miscarriages       Social History     Socioeconomic History   • Marital status:      Spouse name: Not on file   • Number of children: Not on file   • Years of education: Not on file   • Highest education level: Not on file   Tobacco Use   • Smoking status: Former Smoker     Packs/day: 0.00     Years: 0.00     Pack years: 0.00     Types: Cigarettes     Quit date:      Years since quittin.5   • Smokeless tobacco: Never Used   Vaping Use   • Vaping Use: Never used   Substance and Sexual Activity   • Alcohol use: No     Comment: used to drink weekly   • Drug use: No   • Sexual activity: Defer     Partners: Male     Birth control/protection: None           Objective   Physical Exam  Vitals reviewed.   Constitutional:       Appearance: She is well-developed.   HENT:      Head: Normocephalic and atraumatic.      Right Ear: External ear normal.      Left Ear: External ear normal.      Nose: Nose normal.      Mouth/Throat:      Mouth: Mucous membranes are moist.      Pharynx: Oropharynx is clear.   Eyes:      Conjunctiva/sclera: Conjunctivae normal.      Pupils: Pupils are equal, round, and reactive to light.   Cardiovascular:      Rate and Rhythm: Normal rate and regular rhythm.      Pulses: Normal pulses.      Heart sounds: Normal heart sounds.   Pulmonary:      Effort: Pulmonary effort is normal. No respiratory  "distress.      Breath sounds: Normal breath sounds. No wheezing.   Musculoskeletal:         General: No deformity.      Right shoulder: Tenderness present. No swelling, deformity, bony tenderness or crepitus. Decreased range of motion. Decreased strength. Normal pulse.      Left shoulder: Normal.      Cervical back: Normal range of motion and neck supple.   Skin:     General: Skin is warm and dry.      Capillary Refill: Capillary refill takes less than 2 seconds.   Neurological:      Mental Status: She is alert and oriented to person, place, and time.      GCS: GCS eye subscore is 4. GCS verbal subscore is 5. GCS motor subscore is 6.      Cranial Nerves: No cranial nerve deficit.      Sensory: No sensory deficit.      Deep Tendon Reflexes: Reflexes normal.   Psychiatric:         Attention and Perception: Attention normal.         Mood and Affect: Mood normal.         Speech: Speech normal.         Behavior: Behavior normal. Behavior is cooperative.         Procedures           ED Course      /87 (BP Location: Left arm, Patient Position: Sitting)   Pulse 77   Temp 98.9 °F (37.2 °C) (Oral)   Resp 18   Ht 162.6 cm (64\")   Wt 133 kg (293 lb)   SpO2 98%   Breastfeeding No   BMI 50.29 kg/m²   Labs Reviewed - No data to display  Medications   methocarbamol (ROBAXIN) tablet 500 mg (500 mg Oral Given 7/19/21 2108)   ibuprofen (ADVIL,MOTRIN) tablet 600 mg (600 mg Oral Given 7/19/21 2108)     X-ray of the right shoulder is within normal limits reviewed by myself read by the ER physician                                     MDM  Number of Diagnoses or Management Options  Morbid obesity (CMS/HCC)  Right arm pain  Strain of right shoulder, initial encounter  Diagnosis management comments: Patient was found to have normal x-ray she was treated with Robaxin and return here in the emergency room.  She was sent home with some Voltaren.  She was told to follow-up with orthopedic doctor and to wear the sling for comfort " she verbalized understood and her family at bedside    Risk of Complications, Morbidity, and/or Mortality  Presenting problems: minimal  Diagnostic procedures: minimal  Management options: minimal    Patient Progress  Patient progress: improved      Final diagnoses:   Right arm pain   Morbid obesity (CMS/HCC)   Strain of right shoulder, initial encounter       ED Disposition  ED Disposition     ED Disposition Condition Comment    Discharge Stable           Dea Delaney, APRN  [address] [facility]  [address]  [place] [address]  [phone]    In 3 days      Geraldo Rodrigues II, MD  [address] [facility]  [address]  [place] [address]  [phone]    In 3 days  If symptoms worsen, As needed         Medication List      New Prescriptions    diclofenac 75 MG EC tablet  Commonly known as: VOLTAREN  Take 1 tablet by mouth 2 (Two) Times a Day As Needed (pain) for up to 5 days.           Where to Get Your Medications      These medications were sent to [id number], [place] - [address] AT SEC OF [facility] [address] - [phone]  - [phone] FX  [address] [facility] [address] [place] [address]    Phone: [phone]   · diclofenac 75 MG EC tablet          Maria Esther Santana, APRN  07/19/21 8878

## 2021-07-20 NOTE — ED NOTES
Pt reports she has R arm pain for 3 weeks but today it has gotten much worse. Pt denies any trauma to the arm.      Renetta Hernandez RN  07/19/21 2016

## 2021-08-27 ENCOUNTER — OFFICE VISIT (OUTPATIENT)
Dept: FAMILY MEDICINE CLINIC | Facility: CLINIC | Age: 31
End: 2021-08-27

## 2021-08-27 VITALS
TEMPERATURE: 98.4 F | WEIGHT: 293 LBS | OXYGEN SATURATION: 95 % | DIASTOLIC BLOOD PRESSURE: 83 MMHG | BODY MASS INDEX: 50.53 KG/M2 | HEART RATE: 90 BPM | SYSTOLIC BLOOD PRESSURE: 132 MMHG

## 2021-08-27 DIAGNOSIS — R20.2 NUMBNESS AND TINGLING OF UPPER EXTREMITY: Primary | ICD-10-CM

## 2021-08-27 DIAGNOSIS — R20.0 NUMBNESS AND TINGLING OF UPPER EXTREMITY: Primary | ICD-10-CM

## 2021-08-27 DIAGNOSIS — M79.10 MYALGIA: ICD-10-CM

## 2021-08-27 PROCEDURE — 99213 OFFICE O/P EST LOW 20 MIN: CPT | Performed by: PHYSICIAN ASSISTANT

## 2021-08-27 NOTE — PROGRESS NOTES
Subjective   Cindy Thompson is a 31 y.o. female.     Pt states she started having pain in upper outer aspect of right arm years ago and it used to be intermittent.  She states that now her pain is more constant and involves the entire arm and now into hand.  She gets some numbness and pain in her right hand.  She is now also starting to get symptoms in her left arm and hand.  She denies any neck pain.  No known injury.         The following portions of the patient's history were reviewed and updated as appropriate: allergies, current medications, past family history, past medical history, past social history, past surgical history and problem list.  Past Medical History:   Diagnosis Date   • Anesthesia complication     lips swollen after surgeries, ? mouth gaurd, has trouble with respirations during surgery   • Depression    • Disease of thyroid gland    • Hypertension    • Learning disabilities    • Low back pain    • Obesity    • BEBA (obstructive sleep apnea)     uses CPAP   • Snoring    • Uterine cancer (CMS/HCC)     [facility]- Dr. Eva Belle     Past Surgical History:   Procedure Laterality Date   • DILATATION AND CURETTAGE     • ENDOSCOPY N/A 2/21/2020    Procedure: ESOPHAGOGASTRODUODENOSCOPY WITH BIOPSY,;  Surgeon: Elle Bill MD;  Location: [facility] ENDOSCOPY;  Service: General;  Laterality: N/A;   • GASTRIC SLEEVE LAPAROSCOPIC N/A 9/30/2020    Procedure: GASTRIC SLEEVE LAPAROSCOPIC;  Surgeon: Elle Bill MD;  Location: [facility] MAIN OR;  Service: Bariatric;  Laterality: N/A;     Family History   Problem Relation Age of Onset   • Cancer Father    • Hyperlipidemia Father    • Stroke Father    • Arthritis Father         N/A   • Depression Sister         N/A   • Learning disabilities Sister    • Diabetes Maternal Grandmother    • Cancer Maternal Grandmother         N/A   • Heart attack Maternal Grandfather    • Heart disease Maternal Grandfather    • Cancer Paternal Grandmother         Bone Cancer   • Emphysema  Paternal Grandfather    • Arthritis Mother         N/A   • Early death Brother         He  in his crib   • Miscarriages / Stillbirths Sister         Miscarriages     Social History     Socioeconomic History   • Marital status:      Spouse name: Not on file   • Number of children: Not on file   • Years of education: Not on file   • Highest education level: Not on file   Tobacco Use   • Smoking status: Former Smoker     Packs/day: 0.00     Years: 0.00     Pack years: 0.00     Types: Cigarettes     Quit date:      Years since quittin.6   • Smokeless tobacco: Never Used   Vaping Use   • Vaping Use: Never used   Substance and Sexual Activity   • Alcohol use: No     Comment: used to drink weekly   • Drug use: No   • Sexual activity: Defer     Partners: Male     Birth control/protection: None         Current Outpatient Medications:   •  acetaminophen (TYLENOL) 650 MG 8 hr tablet, Take 650 mg by mouth Every 8 (Eight) Hours As Needed., Disp: , Rfl:   •  escitalopram (LEXAPRO) 10 MG tablet, Take 10 mg by mouth Daily., Disp: , Rfl:   •  gabapentin (NEURONTIN) 400 MG capsule, Take 400 mg by mouth 3 (Three) Times a Day., Disp: , Rfl:   •  lamoTRIgine (LaMICtal) 25 MG tablet, Take 25 mg by mouth 2 (Two) Times a Day., Disp: , Rfl:   •  levothyroxine (SYNTHROID, LEVOTHROID) 112 MCG tablet, Take 1 tablet by mouth Daily., Disp: 90 tablet, Rfl: 0  •  metoprolol succinate XL (TOPROL-XL) 50 MG 24 hr tablet, TAKE 1 TABLET BY MOUTH DAILY, Disp: 30 tablet, Rfl: 2    Review of Systems   Constitutional: Negative for activity change, appetite change, chills, diaphoresis, fatigue, fever, unexpected weight gain and unexpected weight loss.   Respiratory: Negative for chest tightness and shortness of breath.    Cardiovascular: Negative for chest pain, palpitations and leg swelling.   Musculoskeletal: Positive for arthralgias and myalgias. Negative for neck pain and neck stiffness.   Neurological: Positive for numbness. Negative  for dizziness, weakness, light-headedness, headache and confusion.     /83 (BP Location: Left arm, Patient Position: Sitting, Cuff Size: Large Adult)   Pulse 90   Temp 98.4 °F (36.9 °C) (Temporal)   Wt 134 kg (294 lb 6.4 oz)   SpO2 95%   BMI 50.53 kg/m²       Objective   Physical Exam  Vitals and nursing note reviewed.   Constitutional:       Appearance: Normal appearance. She is obese.   Neck:      Vascular: No carotid bruit.   Cardiovascular:      Rate and Rhythm: Normal rate and regular rhythm.      Heart sounds: Normal heart sounds.   Pulmonary:      Effort: Pulmonary effort is normal.      Breath sounds: Normal breath sounds.   Musculoskeletal:         General: Tenderness present.      Right shoulder: Decreased range of motion.      Left shoulder: Decreased range of motion.      Right upper arm: Tenderness present. No bony tenderness.      Left upper arm: Tenderness present. No bony tenderness.      Right forearm: Tenderness present.      Left forearm: Tenderness present.      Right wrist: Tenderness present. Decreased range of motion.      Left wrist: Tenderness present. Decreased range of motion.      Right hand: No swelling. Normal range of motion. Normal strength. Normal capillary refill. Normal pulse.      Left hand: No swelling. Normal range of motion. Normal strength. Normal capillary refill. Normal pulse.      Cervical back: Normal range of motion and neck supple. No rigidity or tenderness.   Lymphadenopathy:      Cervical: No cervical adenopathy.   Skin:     General: Skin is warm and dry.      Capillary Refill: Capillary refill takes less than 2 seconds.   Neurological:      Mental Status: She is alert and oriented to person, place, and time.   Psychiatric:         Mood and Affect: Mood normal.         Behavior: Behavior normal.         Thought Content: Thought content normal.         Procedures     Assessment/Plan   Diagnoses and all orders for this visit:    1. Numbness and tingling of  upper extremity (Primary)  -     EMG 2 Limbs; Future    2. Myalgia  -     EMG 2 Limbs; Future  -     diclofenac (VOLTAREN) 50 MG EC tablet; Take 1 tablet by mouth 2 (Two) Times a Day As Needed (arm pain).  Dispense: 60 tablet; Refill: 0        Will try on diclofenac and pt to also get EMG bilateral upper extremities done.  Will call with results.

## 2021-10-22 ENCOUNTER — HOSPITAL ENCOUNTER (OUTPATIENT)
Dept: NEUROLOGY | Facility: HOSPITAL | Age: 31
Discharge: HOME | End: 2021-10-22
Admitting: PHYSICIAN ASSISTANT

## 2021-10-22 DIAGNOSIS — M79.10 MYALGIA: ICD-10-CM

## 2021-10-22 DIAGNOSIS — R20.2 NUMBNESS AND TINGLING OF UPPER EXTREMITY: ICD-10-CM

## 2021-10-22 DIAGNOSIS — R20.0 NUMBNESS AND TINGLING OF UPPER EXTREMITY: ICD-10-CM

## 2021-10-22 PROCEDURE — 95910 NRV CNDJ TEST 7-8 STUDIES: CPT

## 2021-10-22 PROCEDURE — 95910 NRV CNDJ TEST 7-8 STUDIES: CPT | Performed by: PSYCHIATRY & NEUROLOGY

## 2021-10-22 PROCEDURE — 95886 MUSC TEST DONE W/N TEST COMP: CPT | Performed by: PSYCHIATRY & NEUROLOGY

## 2021-10-22 PROCEDURE — 95886 MUSC TEST DONE W/N TEST COMP: CPT

## 2021-10-25 DIAGNOSIS — M79.602 BILATERAL ARM PAIN: Primary | ICD-10-CM

## 2021-10-25 DIAGNOSIS — M79.601 BILATERAL ARM PAIN: Primary | ICD-10-CM

## 2021-10-29 ENCOUNTER — HOSPITAL ENCOUNTER (OUTPATIENT)
Dept: GENERAL RADIOLOGY | Facility: HOSPITAL | Age: 31
Discharge: HOME | End: 2021-10-29
Admitting: PHYSICIAN ASSISTANT

## 2021-10-29 DIAGNOSIS — M79.602 BILATERAL ARM PAIN: ICD-10-CM

## 2021-10-29 DIAGNOSIS — M79.601 BILATERAL ARM PAIN: ICD-10-CM

## 2021-10-29 PROCEDURE — 72040 X-RAY EXAM NECK SPINE 2-3 VW: CPT

## 2021-11-18 ENCOUNTER — TELEPHONE (OUTPATIENT)
Dept: FAMILY MEDICINE CLINIC | Facility: CLINIC | Age: 31
End: 2021-11-18

## 2021-11-18 NOTE — TELEPHONE ENCOUNTER
I will have to see what all it states. I am not sure if it will require an appt separate for him. She can bring it for me to look at.

## 2021-11-18 NOTE — TELEPHONE ENCOUNTER
Caller: Cindy Thompson    Relationship: Self    Best call back number: [phone]    Who are you requesting to speak with (clinical staff, provider,  specific staff member): NURSE     What was the call regarding: PATIENT HAS AN APPT TOMORROW TO SEE RENAE SERRANO FOR PAPERWORK TO BE A  BUT ALSO NEEDS TO HAVE FORM FILLED OUT FOR HER  AND WANTS TO KNOW IF SHE CAN BRING THE FORM WITH HER. PLEASE ADVISE.     Do you require a callback: YES

## 2021-11-19 ENCOUNTER — LAB (OUTPATIENT)
Dept: FAMILY MEDICINE CLINIC | Facility: CLINIC | Age: 31
End: 2021-11-19

## 2021-11-19 ENCOUNTER — OFFICE VISIT (OUTPATIENT)
Dept: FAMILY MEDICINE CLINIC | Facility: CLINIC | Age: 31
End: 2021-11-19

## 2021-11-19 VITALS
SYSTOLIC BLOOD PRESSURE: 135 MMHG | TEMPERATURE: 97.8 F | OXYGEN SATURATION: 96 % | HEART RATE: 87 BPM | WEIGHT: 292 LBS | BODY MASS INDEX: 50.12 KG/M2 | DIASTOLIC BLOOD PRESSURE: 84 MMHG

## 2021-11-19 DIAGNOSIS — I10 HYPERTENSION, UNSPECIFIED TYPE: ICD-10-CM

## 2021-11-19 DIAGNOSIS — Z02.82 ENCOUNTER FOR ADOPTION REFERRAL: Primary | ICD-10-CM

## 2021-11-19 DIAGNOSIS — E03.9 HYPOTHYROIDISM, UNSPECIFIED TYPE: ICD-10-CM

## 2021-11-19 DIAGNOSIS — F33.1 MODERATE EPISODE OF RECURRENT MAJOR DEPRESSIVE DISORDER (HCC): ICD-10-CM

## 2021-11-19 LAB
ALBUMIN SERPL-MCNC: 4.2 G/DL (ref 3.5–5.2)
ALBUMIN/GLOB SERPL: 1.4 G/DL
ALP SERPL-CCNC: 92 U/L (ref 39–117)
ALT SERPL W P-5'-P-CCNC: 12 U/L (ref 1–33)
ANION GAP SERPL CALCULATED.3IONS-SCNC: 7.2 MMOL/L (ref 5–15)
AST SERPL-CCNC: 13 U/L (ref 1–32)
BILIRUB SERPL-MCNC: 0.2 MG/DL (ref 0–1.2)
BUN SERPL-MCNC: 13 MG/DL (ref 6–20)
BUN/CREAT SERPL: 18.3 (ref 7–25)
CALCIUM SPEC-SCNC: 9.7 MG/DL (ref 8.6–10.5)
CHLORIDE SERPL-SCNC: 104 MMOL/L (ref 98–107)
CHOLEST SERPL-MCNC: 259 MG/DL (ref 0–200)
CO2 SERPL-SCNC: 31.8 MMOL/L (ref 22–29)
CREAT SERPL-MCNC: 0.71 MG/DL (ref 0.57–1)
GFR SERPL CREATININE-BSD FRML MDRD: 97 ML/MIN/1.73
GLOBULIN UR ELPH-MCNC: 3.1 GM/DL
GLUCOSE SERPL-MCNC: 100 MG/DL (ref 65–99)
HDLC SERPL-MCNC: 28 MG/DL (ref 40–60)
LDLC SERPL CALC-MCNC: 186 MG/DL (ref 0–100)
LDLC/HDLC SERPL: 6.57 {RATIO}
POTASSIUM SERPL-SCNC: 4.4 MMOL/L (ref 3.5–5.2)
PROT SERPL-MCNC: 7.3 G/DL (ref 6–8.5)
SODIUM SERPL-SCNC: 143 MMOL/L (ref 136–145)
TRIGL SERPL-MCNC: 235 MG/DL (ref 0–150)
TSH SERPL DL<=0.05 MIU/L-ACNC: 2.95 UIU/ML (ref 0.27–4.2)
VLDLC SERPL-MCNC: 45 MG/DL (ref 5–40)

## 2021-11-19 PROCEDURE — 80061 LIPID PANEL: CPT | Performed by: NURSE PRACTITIONER

## 2021-11-19 PROCEDURE — 36415 COLL VENOUS BLD VENIPUNCTURE: CPT

## 2021-11-19 PROCEDURE — 80053 COMPREHEN METABOLIC PANEL: CPT | Performed by: NURSE PRACTITIONER

## 2021-11-19 PROCEDURE — 99214 OFFICE O/P EST MOD 30 MIN: CPT | Performed by: NURSE PRACTITIONER

## 2021-11-19 PROCEDURE — 84443 ASSAY THYROID STIM HORMONE: CPT | Performed by: NURSE PRACTITIONER

## 2021-11-19 RX ORDER — ESCITALOPRAM OXALATE 10 MG/1
10 TABLET ORAL DAILY
Qty: 90 TABLET | OUTPATIENT
Start: 2021-11-19

## 2021-11-19 RX ORDER — ESCITALOPRAM OXALATE 10 MG/1
10 TABLET ORAL DAILY
Qty: 30 TABLET | Refills: 2 | Status: SHIPPED | OUTPATIENT
Start: 2021-11-19

## 2021-11-19 RX ORDER — IBUPROFEN 200 MG
200 TABLET ORAL AS NEEDED
COMMUNITY

## 2021-11-19 NOTE — PATIENT INSTRUCTIONS
Check labs- I will call in levothyroxine depending on results  Restart lexapro  Make appt with psychiatry  Work on diet and exercise

## 2021-11-19 NOTE — PROGRESS NOTES
Subjective     Cindy Thompson is a 31 y.o. female.     Pt is here today to have a visit for becoming a .  She is needing to have paperwork completed.  Pt has a history of bipolar, anxiety, and depression.   [facility]  She stopped all of her meds a couple of months ago due to having issues getting in with the psychiatrist.  She states that she is feeling great at this time.  Denies any SI or HI.   She has not drank alcohol since 04/2021, when she had a stent of cutting herself while intoxicated.  Denies recreational drug use.  Pt had uterine cancer and went through radiation causing infertility.  She is working as a  for the holidays.  She is not exercising regularly.  She had a gastric sleeve last year in Sept.  She went from 375lbs to 292lbs.   She has not been eating a well balanced diet.   She is not smoking cigarettes             The following portions of the patient's history were reviewed and updated as appropriate: allergies, current medications, past family history, past medical history, past social history, past surgical history and problem list.    Review of Systems   Constitutional: Negative for chills, fatigue and fever.   HENT: Negative for congestion and sore throat.    Respiratory: Negative for chest tightness and shortness of breath.    Cardiovascular: Negative for chest pain and palpitations.   Gastrointestinal: Negative for abdominal pain, nausea and vomiting.   Neurological: Negative for dizziness and headache.   Psychiatric/Behavioral: Negative for self-injury, suicidal ideas, depressed mood and stress. The patient is not nervous/anxious.         Stable but would like to restart lexapro       Objective     /84 (BP Location: Right arm, Patient Position: Sitting, Cuff Size: Adult)   Pulse 87   Temp 97.8 °F (36.6 °C) (Tympanic)   Wt 132 kg (292 lb)   SpO2 96%   BMI 50.12 kg/m²     Current Outpatient Medications on  File Prior to Visit   Medication Sig Dispense Refill   • ibuprofen (ADVIL,MOTRIN) 200 MG tablet Take 200 mg by mouth As Needed for Mild Pain .     • [DISCONTINUED] acetaminophen (TYLENOL) 650 MG 8 hr tablet Take 650 mg by mouth Every 8 (Eight) Hours As Needed.     • [DISCONTINUED] diclofenac (VOLTAREN) 50 MG EC tablet Take 1 tablet by mouth 2 (Two) Times a Day As Needed (arm pain). 60 tablet 0   • [DISCONTINUED] escitalopram (LEXAPRO) 10 MG tablet Take 10 mg by mouth Daily.     • [DISCONTINUED] gabapentin (NEURONTIN) 400 MG capsule Take 400 mg by mouth 3 (Three) Times a Day.     • [DISCONTINUED] lamoTRIgine (LaMICtal) 25 MG tablet Take 25 mg by mouth 2 (Two) Times a Day.     • [DISCONTINUED] levothyroxine (SYNTHROID, LEVOTHROID) 112 MCG tablet Take 1 tablet by mouth Daily. 90 tablet 0   • [DISCONTINUED] metoprolol succinate XL (TOPROL-XL) 50 MG 24 hr tablet TAKE 1 TABLET BY MOUTH DAILY 30 tablet 2     No current facility-administered medications on file prior to visit.        Physical Exam  Vitals reviewed.   Constitutional:       General: She is not in acute distress.     Appearance: Normal appearance. She is well-developed. She is not diaphoretic.   HENT:      Head: Normocephalic and atraumatic.   Eyes:      General:         Right eye: No discharge.         Left eye: No discharge.      Conjunctiva/sclera: Conjunctivae normal.   Cardiovascular:      Rate and Rhythm: Normal rate and regular rhythm.   Pulmonary:      Effort: Pulmonary effort is normal. No respiratory distress.      Breath sounds: Normal breath sounds. No wheezing or rales.   Abdominal:      General: Abdomen is flat.      Palpations: Abdomen is soft.   Musculoskeletal:         General: Normal range of motion.      Cervical back: Normal range of motion and neck supple.   Skin:     General: Skin is warm and dry.   Neurological:      General: No focal deficit present.      Mental Status: She is alert and oriented to person, place, and time.    Psychiatric:         Mood and Affect: Mood normal.         Behavior: Behavior normal.         Thought Content: Thought content normal.         Judgment: Judgment normal.           Assessment/Plan     Diagnoses and all orders for this visit:    1. Encounter for adoption referral (Primary)  Comments:  encouraged pt to follow up with psych on mental health conditions  paperwork completed    2. Moderate episode of recurrent major depressive disorder (HCC)  Comments:  off meds  stable  has bipolar  will restart lexapro  f.u 1 mo  Orders:  -     escitalopram (Lexapro) 10 MG tablet; Take 1 tablet by mouth Daily.  Dispense: 30 tablet; Refill: 2    3. Hypothyroidism, unspecified type  Comments:  stopped levothyroxine 112  check TSH level  encouraged to stay on meds  will restart once level results  Orders:  -     TSH; Future    4. Hypertension, unspecified type  Comments:  off meds  stable  will monitor  check labs  encouraged exercise  Orders:  -     Lipid panel; Future  -     Comprehensive metabolic panel; Future

## 2021-11-21 RX ORDER — ATORVASTATIN CALCIUM 10 MG/1
10 TABLET, FILM COATED ORAL DAILY
Qty: 30 TABLET | Refills: 2 | Status: SHIPPED | OUTPATIENT
Start: 2021-11-21 | End: 2023-02-06

## 2021-11-22 RX ORDER — ATORVASTATIN CALCIUM 10 MG/1
10 TABLET, FILM COATED ORAL DAILY
Qty: 90 TABLET | OUTPATIENT
Start: 2021-11-22

## 2021-12-03 ENCOUNTER — APPOINTMENT (OUTPATIENT)
Dept: NEUROLOGY | Facility: HOSPITAL | Age: 31
End: 2021-12-03

## 2021-12-03 DIAGNOSIS — M79.601 PAIN IN BOTH UPPER EXTREMITIES: Primary | ICD-10-CM

## 2021-12-03 DIAGNOSIS — M79.602 PAIN IN BOTH UPPER EXTREMITIES: Primary | ICD-10-CM

## 2021-12-14 ENCOUNTER — OFFICE VISIT (OUTPATIENT)
Dept: ORTHOPEDIC SURGERY | Facility: CLINIC | Age: 31
End: 2021-12-14

## 2021-12-14 VITALS
RESPIRATION RATE: 18 BRPM | SYSTOLIC BLOOD PRESSURE: 136 MMHG | BODY MASS INDEX: 50.02 KG/M2 | WEIGHT: 293 LBS | OXYGEN SATURATION: 97 % | HEART RATE: 81 BPM | HEIGHT: 64 IN | DIASTOLIC BLOOD PRESSURE: 86 MMHG

## 2021-12-14 DIAGNOSIS — M79.601 BILATERAL ARM PAIN: Primary | ICD-10-CM

## 2021-12-14 DIAGNOSIS — M75.81 TENDINITIS OF RIGHT ROTATOR CUFF: ICD-10-CM

## 2021-12-14 DIAGNOSIS — M79.602 BILATERAL ARM PAIN: Primary | ICD-10-CM

## 2021-12-14 DIAGNOSIS — M75.21 BICEPS TENDINITIS OF RIGHT UPPER EXTREMITY: ICD-10-CM

## 2021-12-14 PROCEDURE — 76942 ECHO GUIDE FOR BIOPSY: CPT | Performed by: FAMILY MEDICINE

## 2021-12-14 PROCEDURE — 99203 OFFICE O/P NEW LOW 30 MIN: CPT | Performed by: FAMILY MEDICINE

## 2021-12-14 PROCEDURE — 20550 NJX 1 TENDON SHEATH/LIGAMENT: CPT | Performed by: FAMILY MEDICINE

## 2021-12-14 NOTE — PROGRESS NOTES
Primary Care Sports Medicine Office Visit Note     Patient ID: Cindy Thompson is a 31 y.o. female.    Chief Complaint:  Chief Complaint   Patient presents with   • Upper Extremity Issue     new pt c/o bilateral arm pain no known injury has had emg xrays      HPI:    Ms. Cindy Thompson is a 31 y.o. female who presents to the clinic today for bilateral upper extremity pain. Pt states that she has had intermittent R upper arm pain for near 12 years. She cites a remote injury in 2009 when she fell while carrying a radio. L arm just started to have similar pain near 3 months ago. Pain is described as achy, whole arm. Occasional hand numbness as well. Whole hand goes numb. She denies any weakness. Has seen multiple physicians about this, has had XR of bilateral shoulders, c-spine, and has had EMG that was essentially normal. She denies the ability to provoke or reproduce pain. Unsure of what causes pain. Has difficulty describing her pain.     Past Medical History:   Diagnosis Date   • Anesthesia complication     lips swollen after surgeries, ? mouth gaurd, has trouble with respirations during surgery   • Depression    • Disease of thyroid gland    • Hypertension    • Learning disabilities    • Low back pain    • Obesity    • BEBA (obstructive sleep apnea)     uses CPAP   • Snoring    • Uterine cancer (HCC)     [facility]- Dr. Eva Belle       Past Surgical History:   Procedure Laterality Date   • DILATATION AND CURETTAGE     • ENDOSCOPY N/A 2/21/2020    Procedure: ESOPHAGOGASTRODUODENOSCOPY WITH BIOPSY,;  Surgeon: Elle Bill MD;  Location: [facility] ENDOSCOPY;  Service: General;  Laterality: N/A;   • GASTRIC SLEEVE LAPAROSCOPIC N/A 9/30/2020    Procedure: GASTRIC SLEEVE LAPAROSCOPIC;  Surgeon: Elle Bill MD;  Location: [facility] MAIN OR;  Service: Bariatric;  Laterality: N/A;       Family History   Problem Relation Age of Onset   • Cancer Father    • Hyperlipidemia Father    • Stroke Father    • Arthritis Father         N/A   •  "Depression Sister         N/A   • Learning disabilities Sister    • Diabetes Maternal Grandmother    • Cancer Maternal Grandmother         N/A   • Heart attack Maternal Grandfather    • Heart disease Maternal Grandfather    • Cancer Paternal Grandmother         Bone Cancer   • Emphysema Paternal Grandfather    • Arthritis Mother         N/A   • Early death Brother         He  in his crib   • Miscarriages / Stillbirths Sister         Miscarriages     Social History     Occupational History   • Not on file   Tobacco Use   • Smoking status: Former Smoker     Packs/day: 0.25     Years: 1.00     Pack years: 0.25     Types: Cigarettes     Quit date:      Years since quittin.9   • Smokeless tobacco: Never Used   Vaping Use   • Vaping Use: Never used   Substance and Sexual Activity   • Alcohol use: No     Comment: used to drink weekly   • Drug use: No   • Sexual activity: Defer     Partners: Male     Birth control/protection: None      Review of Systems   Constitutional: Negative for activity change and fever.   Musculoskeletal: Positive for arthralgias.   Skin: Negative for color change and rash.   Neurological: Negative for weakness.     Objective:    /86   Pulse 81   Resp 18   Ht 162.6 cm (64\")   Wt 134 kg (295 lb 6.4 oz)   SpO2 97%   BMI 50.71 kg/m²     Physical Examination:  Physical Exam  Vitals and nursing note reviewed.   Constitutional:       General: She is not in acute distress.     Appearance: She is well-developed. She is not diaphoretic.   HENT:      Head: Normocephalic and atraumatic.   Eyes:      Conjunctiva/sclera: Conjunctivae normal.   Pulmonary:      Effort: Pulmonary effort is normal. No respiratory distress.   Skin:     General: Skin is warm.      Capillary Refill: Capillary refill takes less than 2 seconds.   Neurological:      Mental Status: She is alert.       Right Shoulder Exam     Tenderness   The patient is experiencing tenderness in the biceps tendon.    Range of Motion "   Active abduction: normal   Extension: normal   External rotation: normal   Forward flexion: normal   Internal rotation 0 degrees:  Mid thoracic normal     Muscle Strength   External rotation: 5/5   Supraspinatus: 5/5   Subscapularis: 5/5   Biceps: 5/5     Tests   Apprehension: negative  Tobin test: negative    Other   Erythema: absent  Sensation: normal  Pulse: present    Comments:  Negative Alivia, negative resisted external rotation, positive liftoff.  Negative scarf.  Positive Neer.  Positive speeds/Yergason's.    Cervical range of motion is full with no tenderness to palpation to the bony midline or paraspinal cervical musculature.  Spurling's maneuver to the right is negative.      Left Shoulder Exam     Tenderness   The patient is experiencing tenderness in the biceps tendon.    Range of Motion   Active abduction: normal   Extension: normal   External rotation: normal   Forward flexion: normal   Internal rotation 0 degrees:  Mid thoracic normal     Muscle Strength   External rotation: 5/5   Supraspinatus: 5/5   Subscapularis: 5/5   Biceps: 5/5     Tests   Apprehension: negative  Tobin test: negative    Other   Erythema: absent  Sensation: normal  Pulse: present     Comments:  Negative Alivia, negative resisted external rotation, positive liftoff.  Negative scarf.  Positive Neer.  Positive speeds/Yergason's.    Cervical range of motion is full with no tenderness to palpation to the bony midline or paraspinal cervical musculature.  Spurling's maneuver to the left is negative        Imaging and other tests:  XR bilateral humerus today yields no acute bony abnormity.     Bilateral shoulder XR dated 7/19/21 yields no acute bony abnormity.    Cervical spine XR dated 10/29/21 yields no acute bony abnormity.    EMG of RENATO on 10/22/21 reveals the following:  Impression:  This is a normal study of the upper extremities.  There is no evidence of focal median or ulnar neuropathy or neurogenic change in the muscles  "examined in the bilateral C5-T1 myotomes.    Assessment and Plan:    1. Bilateral arm pain  - XR Humerus 2 View Bilateral    2. Biceps tendinitis of right upper extremity    3. Tendinitis of right rotator cuff    I discussed pathology and treatment options with the patient today.  She has a very difficult time explaining her symptomatology, and has relatively negative testing to this point.  She seems to have a moderate amount of biceps tendon pain generation today on examination, and mild correlative subscapularis pain as well.  We discussed treatment options, she elects to proceed with corticosteroid injection to the right biceps tenosynovium.  This was done under ultrasound guidance.  The patient tolerated this procedure well without complaint or problem.  RTC in 2-3 months or sooner if pain persists or worsens.    Hardeep DE LEON \"Chance\" Nigel COLVIN DO, CAQSM  12/15/21  13:03 EST    Disclaimer: Please note that areas of this note were completed with computer voice recognition software.  Quite often unanticipated grammatical, syntax, homophones, and other interpretive errors are inadvertently transcribed by the computer software. Please excuse any errors that have escaped final proofreading.  "

## 2021-12-15 RX ORDER — TRIAMCINOLONE ACETONIDE 40 MG/ML
80 INJECTION, SUSPENSION INTRA-ARTICULAR; INTRAMUSCULAR
Status: COMPLETED | OUTPATIENT
Start: 2021-12-15 | End: 2021-12-15

## 2021-12-15 RX ADMIN — TRIAMCINOLONE ACETONIDE 80 MG: 40 INJECTION, SUSPENSION INTRA-ARTICULAR; INTRAMUSCULAR at 13:05

## 2021-12-15 NOTE — PROGRESS NOTES
"Procedure   Injection Tendon or Ligament    Date/Time: 12/15/2021 1:05 PM  Performed by: Hardeep Manning II, DO  Authorized by: Hardeep Manning II, DO   Preparation: Patient was prepped and draped in the usual sterile fashion.  Local anesthesia used: no    Anesthesia:  Local anesthesia used: no    Sedation:  Patient sedated: no    Patient tolerance: patient tolerated the procedure well with no immediate complications  Comments: An ultrasound-guided right biceps tendon sheath injection was performed today.  After risks and benefits were discussed, and patient was identified, the right anterior shoulder was prepped and draped in usual fashion.  Alcohol and Betadine was used to cleanse the skin.  Ethyl chloride spray was used for skin anesthesia.  To ensure accuracy and precision of medication delivery, a 22-gauge 1/2 inch needle was then used with ultrasound guidance to infiltrate the biceps tendon sheath.  2 cc of 1% lidocaine without epinephrine and 2 cc of Kenalog 40 mg was then injected into the sheath. Please see saved ultrasound images.  The needle was then removed without complication.  Blood loss negligible.  Patient admits to near immediate pain relief with gentle range of motion post injection.     Hardeep DE LOEN \"Chance\" Nigel COLVIN DO, CAQSM  12/15/21  13:05 EST  Medications administered: 80 mg triamcinolone acetonide 40 MG/ML           "

## 2021-12-16 ENCOUNTER — PATIENT ROUNDING (BHMG ONLY) (OUTPATIENT)
Dept: ORTHOPEDIC SURGERY | Facility: CLINIC | Age: 31
End: 2021-12-16

## 2021-12-16 NOTE — PROGRESS NOTES
A My-Chart message has been sent to the patient for PATIENT ROUNDING with [facility]

## 2022-01-12 ENCOUNTER — TELEPHONE (OUTPATIENT)
Dept: FAMILY MEDICINE CLINIC | Facility: CLINIC | Age: 32
End: 2022-01-12

## 2022-01-12 NOTE — TELEPHONE ENCOUNTER
Caller: Cindy Thompson    Relationship: Self    Best call back number: [phone]    What medication are you requesting: COLD RELIEF    What are your current symptoms: COUGH, CONGESTION, SORE THROAT, HARD TO BREATH    How long have you been experiencing symptoms: 4 DAYS    Have you had these symptoms before:    [x] Yes  [] No    Have you been treated for these symptoms before:   [x] Yes  [] No    If a prescription is needed, what is your preferred pharmacy and phone number:    [id number] [place], [place] - [address] AT [facility] [phone] [facility] [phone] FX        Additional notes: PATIENT IS REQUESTING COLD MEDICINE. PLEASE ADVISE

## 2022-02-08 ENCOUNTER — TELEPHONE (OUTPATIENT)
Dept: BARIATRICS/WEIGHT MGMT | Facility: CLINIC | Age: 32
End: 2022-02-08

## 2022-02-08 ENCOUNTER — OFFICE VISIT (OUTPATIENT)
Dept: ORTHOPEDIC SURGERY | Facility: CLINIC | Age: 32
End: 2022-02-08

## 2022-02-08 VITALS
HEIGHT: 64 IN | BODY MASS INDEX: 50.02 KG/M2 | OXYGEN SATURATION: 96 % | RESPIRATION RATE: 18 BRPM | WEIGHT: 293 LBS | HEART RATE: 81 BPM

## 2022-02-08 DIAGNOSIS — M75.21 BICEPS TENDINITIS OF RIGHT SHOULDER: Primary | ICD-10-CM

## 2022-02-08 PROCEDURE — 99213 OFFICE O/P EST LOW 20 MIN: CPT | Performed by: FAMILY MEDICINE

## 2022-02-08 NOTE — PROGRESS NOTES
Primary Care Sports Medicine Office Visit Note     Patient ID: Cindy Thompson is a 31 y.o. female.    Chief Complaint:  Chief Complaint   Patient presents with   • Right Shoulder - Follow-up   • Left Shoulder - Follow-up     HPI:    Ms. Cindy Thompson is a 31 y.o. female who presents to the clinic today for follow up evaluation of Bilateral shoulder pain. Pt was seen and evaluated 3 weeks ago, and we felt to have moderate biceps pathology, and was afforded corticosteroid injection at that time but unfortunately, this did not provider her much relief. Since, then, she has continued to have posterior rotator cuff type shoulder pain, to bilateral shoulders.  She is taking Advil currently over-the-counter.    Past Medical History:   Diagnosis Date   • Anesthesia complication     lips swollen after surgeries, ? mouth gaurd, has trouble with respirations during surgery   • Depression    • Disease of thyroid gland    • Hypertension    • Learning disabilities    • Low back pain    • Obesity    • BEBA (obstructive sleep apnea)     uses CPAP   • Snoring    • Uterine cancer (HCC)     [facility]- Dr. Eva Belle       Past Surgical History:   Procedure Laterality Date   • DILATATION AND CURETTAGE     • ENDOSCOPY N/A 2/21/2020    Procedure: ESOPHAGOGASTRODUODENOSCOPY WITH BIOPSY,;  Surgeon: Elle Bill MD;  Location: [facility] ENDOSCOPY;  Service: General;  Laterality: N/A;   • GASTRIC SLEEVE LAPAROSCOPIC N/A 9/30/2020    Procedure: GASTRIC SLEEVE LAPAROSCOPIC;  Surgeon: Elle Bill MD;  Location: [facility] MAIN OR;  Service: Bariatric;  Laterality: N/A;       Family History   Problem Relation Age of Onset   • Cancer Father    • Hyperlipidemia Father    • Stroke Father    • Arthritis Father         N/A   • Depression Sister         N/A   • Learning disabilities Sister    • Diabetes Maternal Grandmother    • Cancer Maternal Grandmother         N/A   • Heart attack Maternal Grandfather    • Heart disease Maternal Grandfather    • Cancer  "Paternal Grandmother         Bone Cancer   • Emphysema Paternal Grandfather    • Arthritis Mother         N/A   • Early death Brother         He  in his crib   • Miscarriages / Stillbirths Sister         Miscarriages     Social History     Occupational History   • Not on file   Tobacco Use   • Smoking status: Former Smoker     Packs/day: 0.25     Years: 1.00     Pack years: 0.25     Types: Cigarettes     Quit date:      Years since quitting: 10.1   • Smokeless tobacco: Never Used   Vaping Use   • Vaping Use: Never used   Substance and Sexual Activity   • Alcohol use: No     Comment: used to drink weekly   • Drug use: No   • Sexual activity: Defer     Partners: Male     Birth control/protection: None      Review of Systems   Constitutional: Negative for activity change and fever.   Musculoskeletal: Positive for arthralgias.   Skin: Negative for color change and rash.   Neurological: Negative for weakness.     Objective:    Pulse 81   Resp 18   Ht 162.6 cm (64\")   Wt 134 kg (295 lb)   SpO2 96%   BMI 50.64 kg/m²     Physical Examination:  Physical Exam  Vitals and nursing note reviewed.   Constitutional:       General: She is not in acute distress.     Appearance: She is well-developed. She is not diaphoretic.   HENT:      Head: Normocephalic and atraumatic.   Eyes:      Conjunctiva/sclera: Conjunctivae normal.   Pulmonary:      Effort: Pulmonary effort is normal. No respiratory distress.   Skin:     General: Skin is warm.      Capillary Refill: Capillary refill takes less than 2 seconds.   Neurological:      Mental Status: She is alert.       Right Shoulder Exam     Tenderness   The patient is experiencing no tenderness.    Range of Motion   Active abduction: normal   Extension: normal   External rotation: normal   Forward flexion: normal   Internal rotation 0 degrees:  Mid thoracic normal     Muscle Strength   External rotation: 5/5   Supraspinatus: 5/5   Subscapularis: 5/5   Biceps: 4/5     Other " "  Erythema: absent  Sensation: normal  Pulse: present    Comments:  Negative Alivia, negative resisted external rotation, negative liftoff.  Negative Seneca's, negative scarf.  Negative Neer.  Strongly positive speeds/Yergason's.        Imaging and other tests:  No new imaging today.    Assessment and Plan:    1. Biceps tendinitis of right shoulder    I discussed pathology with the patient, unfortunately she has not had much improvement.  I recommend she continue home stretching and strengthening exercise program that we discussed, and attempt over-the-counter Rx alternatives pain cream.  RTC in 4 to 6 weeks if no improvement.    Hardeep DE LEON \"Chance\" Nigel COLVIN DO, CAQSM  02/15/22  10:27 EST    Disclaimer: Please note that areas of this note were completed with computer voice recognition software.  Quite often unanticipated grammatical, syntax, homophones, and other interpretive errors are inadvertently transcribed by the computer software. Please excuse any errors that have escaped final proofreading.  "

## 2022-02-08 NOTE — TELEPHONE ENCOUNTER
Questions about vitamins/Arm pain    Cindy wanted to know what kind of vitamins that she should be taking as she has had lots of arm apin and numbness, seen many doctors and they all told her to see her bariatric physician. I explained that she should be taking a bariatric vitamin as was taught in Pre Op education, and stated in binder.  I also asked if I could make her an appointment as we have not seen her since her 4 month follow up (over a year) and she could use labs to see what if any deficiency she may have.  I explained that we need to see our patients once yearly to order labs, prescriptions and she stated she was not looking for a prescription and as I tried to explain she hung up.  2/28/22 MN

## 2022-07-28 ENCOUNTER — HOSPITAL ENCOUNTER (EMERGENCY)
Facility: HOSPITAL | Age: 32
Discharge: LEFT BEFORE DISCHARGE | End: 2022-07-28

## 2022-07-28 VITALS
SYSTOLIC BLOOD PRESSURE: 126 MMHG | TEMPERATURE: 98.4 F | RESPIRATION RATE: 18 BRPM | HEIGHT: 64 IN | DIASTOLIC BLOOD PRESSURE: 86 MMHG | HEART RATE: 78 BPM | WEIGHT: 293 LBS | BODY MASS INDEX: 50.02 KG/M2 | OXYGEN SATURATION: 98 %

## 2022-07-28 PROCEDURE — 99211 OFF/OP EST MAY X REQ PHY/QHP: CPT

## 2022-10-05 ENCOUNTER — HOSPITAL ENCOUNTER (EMERGENCY)
Facility: HOSPITAL | Age: 32
Discharge: LEFT BEFORE DISCHARGE | End: 2022-10-06
Attending: EMERGENCY MEDICINE

## 2022-10-05 VITALS
WEIGHT: 293 LBS | RESPIRATION RATE: 18 BRPM | OXYGEN SATURATION: 98 % | DIASTOLIC BLOOD PRESSURE: 94 MMHG | HEIGHT: 64 IN | SYSTOLIC BLOOD PRESSURE: 136 MMHG | TEMPERATURE: 98.3 F | BODY MASS INDEX: 50.02 KG/M2 | HEART RATE: 75 BPM

## 2022-10-05 PROCEDURE — 99211 OFF/OP EST MAY X REQ PHY/QHP: CPT | Performed by: EMERGENCY MEDICINE

## 2023-02-06 ENCOUNTER — OFFICE VISIT (OUTPATIENT)
Dept: FAMILY MEDICINE CLINIC | Facility: CLINIC | Age: 33
End: 2023-02-06
Payer: MEDICARE

## 2023-02-06 VITALS
TEMPERATURE: 97.5 F | HEART RATE: 78 BPM | DIASTOLIC BLOOD PRESSURE: 89 MMHG | BODY MASS INDEX: 52.35 KG/M2 | SYSTOLIC BLOOD PRESSURE: 134 MMHG | OXYGEN SATURATION: 98 % | WEIGHT: 293 LBS

## 2023-02-06 DIAGNOSIS — Z86.14 HISTORY OF MRSA INFECTION: Primary | ICD-10-CM

## 2023-02-06 LAB — MRSA DNA SPEC QL NAA+PROBE: ABNORMAL

## 2023-02-06 PROCEDURE — 87641 MR-STAPH DNA AMP PROBE: CPT | Performed by: NURSE PRACTITIONER

## 2023-02-06 PROCEDURE — 99213 OFFICE O/P EST LOW 20 MIN: CPT | Performed by: NURSE PRACTITIONER

## 2023-02-06 RX ORDER — ATORVASTATIN CALCIUM 80 MG/1
TABLET, FILM COATED ORAL DAILY
COMMUNITY
Start: 2023-02-03

## 2023-02-06 RX ORDER — DICYCLOMINE HYDROCHLORIDE 10 MG/1
CAPSULE ORAL DAILY
COMMUNITY
Start: 2023-02-03

## 2023-02-06 NOTE — PROGRESS NOTES
Subjective     Cindy Thompson is a 32 y.o. female.     History of Present Illness  Pt is here today with concerns of MRSA.  In 2020 she was diagnosed with it with a nasal swab  She states her manager is needing a note stating she does not actively have MRSA in regards to this due to her having cancer  She used to get sores on her abdomen that were assumed to be MRSA  She is not sure if she ever had treatment.  She will occasionally get sores on her abdomen still but not often.   Denies fever fatigue or chills.          The following portions of the patient's history were reviewed and updated as appropriate: allergies, current medications, past family history, past medical history, past social history, past surgical history and problem list.    Review of Systems   Constitutional: Negative for chills, fatigue and fever.   Respiratory: Negative for chest tightness and shortness of breath.    Cardiovascular: Negative for chest pain and palpitations.   Neurological: Negative for dizziness and headache.       Objective     /89 (BP Location: Right arm, Patient Position: Sitting, Cuff Size: Large Adult)   Pulse 78   Temp 97.5 °F (36.4 °C) (Tympanic)   Wt (!) 138 kg (305 lb)   SpO2 98%   BMI 52.35 kg/m²     Current Outpatient Medications on File Prior to Visit   Medication Sig Dispense Refill   • atorvastatin (LIPITOR) 80 MG tablet Daily.     • dicyclomine (BENTYL) 10 MG capsule Daily.     • escitalopram (Lexapro) 10 MG tablet Take 1 tablet by mouth Daily. 30 tablet 2   • ibuprofen (ADVIL,MOTRIN) 200 MG tablet Take 200 mg by mouth As Needed for Mild Pain .     • [DISCONTINUED] atorvastatin (Lipitor) 10 MG tablet Take 1 tablet by mouth Daily. 30 tablet 2     No current facility-administered medications on file prior to visit.        Physical Exam  Vitals reviewed.   Constitutional:       General: She is not in acute distress.     Appearance: Normal appearance. She is well-developed. She is obese. She is not  diaphoretic.   HENT:      Head: Normocephalic and atraumatic.   Eyes:      General:         Right eye: No discharge.         Left eye: No discharge.      Conjunctiva/sclera: Conjunctivae normal.   Cardiovascular:      Rate and Rhythm: Normal rate and regular rhythm.      Heart sounds: No murmur heard.  Pulmonary:      Effort: Pulmonary effort is normal. No respiratory distress.      Breath sounds: Normal breath sounds. No wheezing or rales.   Abdominal:      General: Bowel sounds are normal.      Palpations: Abdomen is soft.   Musculoskeletal:         General: Normal range of motion.      Cervical back: Normal range of motion.   Skin:     General: Skin is warm and dry.      Findings: Lesion (small dried ulcerated lesion on abdomen) present.   Neurological:      General: No focal deficit present.      Mental Status: She is alert and oriented to person, place, and time.   Psychiatric:         Mood and Affect: Mood normal.         Behavior: Behavior normal.         Thought Content: Thought content normal.         Judgment: Judgment normal.           Assessment & Plan     Diagnoses and all orders for this visit:    1. History of MRSA infection (Primary)  Comments:  will swab nares to see if there is an active current infection  treat accordingly  will send note to patient once completed  Orders:  -     MRSA Screen Culture (Outpatient) - Swab, Nares; Future

## 2023-02-07 DIAGNOSIS — A49.02 MRSA (METHICILLIN RESISTANT STAPHYLOCOCCUS AUREUS): Primary | ICD-10-CM

## 2023-02-07 RX ORDER — CHLORHEXIDINE GLUCONATE 4 G/100ML
SOLUTION TOPICAL
Qty: 3790 ML | Refills: 1 | Status: SHIPPED | OUTPATIENT
Start: 2023-02-07 | End: 2023-02-09 | Stop reason: SDUPTHER

## 2023-02-09 ENCOUNTER — TELEPHONE (OUTPATIENT)
Dept: FAMILY MEDICINE CLINIC | Facility: CLINIC | Age: 33
End: 2023-02-09
Payer: MEDICARE

## 2023-02-09 DIAGNOSIS — A49.02 MRSA (METHICILLIN RESISTANT STAPHYLOCOCCUS AUREUS): ICD-10-CM

## 2023-02-09 RX ORDER — CHLORHEXIDINE GLUCONATE 4 G/100ML
SOLUTION TOPICAL
Qty: 3790 ML | Refills: 1 | Status: SHIPPED | OUTPATIENT
Start: 2023-02-09

## 2023-02-09 NOTE — TELEPHONE ENCOUNTER
Caller: Cindy Thompson    Relationship: Self    Best call back number: [phone]    What was the call regarding: PATIENT STATED THAT [facility]NA[facility] SERRANO WAS SENDING IN MEDICATION FOR MRSA. PATIENT STATED SHE WENT TO [email or web address] AND THEY DID NOT RECEIVE THE PRESCRIPTIONS. PLEASE ADVISE    PATIENT'S PHARMACY:    [id number], [place] - [address] AT [address] [facility] - [phone]  - [phone] FX        Do you require a callback: YES

## 2023-04-26 ENCOUNTER — LAB (OUTPATIENT)
Dept: FAMILY MEDICINE CLINIC | Facility: CLINIC | Age: 33
End: 2023-04-26
Payer: MEDICARE

## 2023-04-26 ENCOUNTER — OFFICE VISIT (OUTPATIENT)
Dept: FAMILY MEDICINE CLINIC | Facility: CLINIC | Age: 33
End: 2023-04-26
Payer: MEDICARE

## 2023-04-26 VITALS
BODY MASS INDEX: 50.02 KG/M2 | TEMPERATURE: 98.4 F | HEIGHT: 64 IN | SYSTOLIC BLOOD PRESSURE: 128 MMHG | DIASTOLIC BLOOD PRESSURE: 89 MMHG | OXYGEN SATURATION: 97 % | HEART RATE: 73 BPM | WEIGHT: 293 LBS

## 2023-04-26 DIAGNOSIS — Z00.00 MEDICARE ANNUAL WELLNESS VISIT, SUBSEQUENT: Primary | ICD-10-CM

## 2023-04-26 DIAGNOSIS — E66.01 CLASS 3 SEVERE OBESITY WITHOUT SERIOUS COMORBIDITY WITH BODY MASS INDEX (BMI) OF 50.0 TO 59.9 IN ADULT, UNSPECIFIED OBESITY TYPE: ICD-10-CM

## 2023-04-26 DIAGNOSIS — R73.9 HYPERGLYCEMIA, UNSPECIFIED: ICD-10-CM

## 2023-04-26 DIAGNOSIS — Z85.42 HISTORY OF UTERINE CANCER: ICD-10-CM

## 2023-04-26 DIAGNOSIS — R42 DIZZINESS: ICD-10-CM

## 2023-04-26 DIAGNOSIS — Z00.00 MEDICARE ANNUAL WELLNESS VISIT, SUBSEQUENT: ICD-10-CM

## 2023-04-26 PROCEDURE — 83036 HEMOGLOBIN GLYCOSYLATED A1C: CPT | Performed by: NURSE PRACTITIONER

## 2023-04-26 PROCEDURE — 80053 COMPREHEN METABOLIC PANEL: CPT | Performed by: NURSE PRACTITIONER

## 2023-04-26 PROCEDURE — 1159F MED LIST DOCD IN RCRD: CPT | Performed by: NURSE PRACTITIONER

## 2023-04-26 PROCEDURE — 85025 COMPLETE CBC W/AUTO DIFF WBC: CPT | Performed by: NURSE PRACTITIONER

## 2023-04-26 PROCEDURE — 1160F RVW MEDS BY RX/DR IN RCRD: CPT | Performed by: NURSE PRACTITIONER

## 2023-04-26 PROCEDURE — 80061 LIPID PANEL: CPT | Performed by: NURSE PRACTITIONER

## 2023-04-26 PROCEDURE — 1170F FXNL STATUS ASSESSED: CPT | Performed by: NURSE PRACTITIONER

## 2023-04-26 PROCEDURE — 3074F SYST BP LT 130 MM HG: CPT | Performed by: NURSE PRACTITIONER

## 2023-04-26 PROCEDURE — 36415 COLL VENOUS BLD VENIPUNCTURE: CPT

## 2023-04-26 PROCEDURE — 3079F DIAST BP 80-89 MM HG: CPT | Performed by: NURSE PRACTITIONER

## 2023-04-26 PROCEDURE — G0439 PPPS, SUBSEQ VISIT: HCPCS | Performed by: NURSE PRACTITIONER

## 2023-04-26 NOTE — PROGRESS NOTES
"Subjective     Cindy Thompson is a 32 y.o. female.     History of Present Illness     The following portions of the patient's history were reviewed and updated as appropriate: {history reviewed:20406::\"allergies\",\"current medications\",\"past family history\",\"past medical history\",\"past social history\",\"past surgical history\",\"problem list\"}.    Review of Systems    Objective     /89 (BP Location: Left arm, Patient Position: Sitting, Cuff Size: Large Adult)   Pulse 73   Temp 98.4 °F (36.9 °C) (Temporal)   Ht 162.6 cm (64\")   Wt (!) 138 kg (303 lb 9.6 oz)   SpO2 97%   BMI 52.11 kg/m²     Current Outpatient Medications on File Prior to Visit   Medication Sig Dispense Refill   • chlorhexidine (HIBICLENS) 4 % external liquid Apply to body (avoid face and genitals) in shower and rinse daily for 5 days. Repeat 2 times per month for 6 mo. 3790 mL 1   • Cholecalciferol 125 MCG (5000 UT) tablet Take 1 tablet by mouth Daily.     • ibuprofen (ADVIL,MOTRIN) 200 MG tablet Take 1 tablet by mouth As Needed for Mild Pain.     • mupirocin (BACTROBAN) 2 % ointment Apply a small amount in each nostril 2 times daily for 5 days. Repeat 2x/mo for 6 mo 30 g 1   • atorvastatin (LIPITOR) 80 MG tablet Daily.     • [DISCONTINUED] dicyclomine (BENTYL) 10 MG capsule Daily.     • [DISCONTINUED] escitalopram (Lexapro) 10 MG tablet Take 1 tablet by mouth Daily. 30 tablet 2     No current facility-administered medications on file prior to visit.        Physical Exam      Assessment & Plan     There are no diagnoses linked to this encounter.          "

## 2023-04-26 NOTE — PROGRESS NOTES
"The ABCs of the Annual Wellness Visit  Subsequent Medicare Wellness Visit    Chief Complaint   Patient presents with   • Dizziness     Couple weeks  Notices when she is walking  Having to sit down a lot at work due to this and back pain  Work is having issues with her sitting down, needed her to get doctor's note      Subjective    History of Present Illness:  Cindy Thompson is a 32 y.o. female who presents for a Subsequent Medicare Wellness Visit.    Pt is here today for her annual CPE.   Pt has cholesterol meds on her list but she doesn't think she is on anything now/.  She does Vit D and ibuprofen prn  She has been doing MRSA cleanses.  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  She stays active  She has been hiking some and walking.  She is eating a well balanced diet.  She states she doesn't have any menstrual cycles since radiation due to her uterine cancer  She has not been following up with Dr. Mccauley because she does not have anyone that can take her to [place] for appts.   Denies any breast issues.  She is .   Pt reports that her depression is \"ok\"  She has a history of self harm.   She does not want to be on any meds  Denies SI or HI.   She drinks 2 shots a day    Pt has been having some dizziness for the last few weeks.  She believes it is stress related  She states the episodes dont last long.  It has occurred when she walks to work.  She believes it could be the anxiety of having to go to work- there has been drama at work.  She states she is ok before she leaves the house.   No issues with position changes or turning of the head.  She has had about 4 or 5 episodes but they resolve quickly.  Denies CP, SOA, HA, change in vision.     Labs- due  Pap smear- due- will refer to gyn with history of uterine cancer    Vaccines:  Flu- N/A  PNA- n/A  Tdap-  Covid-19-    Dental exam-  Eye exam-      The following portions of the patient's history were reviewed and   updated as appropriate: allergies, current medications, " past family history, past medical history, past social history, past surgical history and problem list.    Compared to one year ago, the patient feels her physical   health is better.    Compared to one year ago, the patient feels her mental   health is better.    Recent Hospitalizations:  She was admitted within the past 365 days at [facility].       Current Medical Providers:  Patient Care Team:  Dea Delaney APRN as PCP - General (Nurse Practitioner)  Provider, No Known    Outpatient Medications Prior to Visit   Medication Sig Dispense Refill   • chlorhexidine (HIBICLENS) 4 % external liquid Apply to body (avoid face and genitals) in shower and rinse daily for 5 days. Repeat 2 times per month for 6 mo. 3790 mL 1   • Cholecalciferol 125 MCG (5000 UT) tablet Take 1 tablet by mouth Daily.     • ibuprofen (ADVIL,MOTRIN) 200 MG tablet Take 1 tablet by mouth As Needed for Mild Pain.     • mupirocin (BACTROBAN) 2 % ointment Apply a small amount in each nostril 2 times daily for 5 days. Repeat 2x/mo for 6 mo 30 g 1   • atorvastatin (LIPITOR) 80 MG tablet Daily.     • dicyclomine (BENTYL) 10 MG capsule Daily.     • escitalopram (Lexapro) 10 MG tablet Take 1 tablet by mouth Daily. 30 tablet 2     No facility-administered medications prior to visit.       No opioid medication identified on active medication list. I have reviewed chart for other potential  high risk medication/s and harmful drug interactions in the elderly.          Aspirin is not on active medication list.  Aspirin use is not indicated based on review of current medical condition/s. Risk of harm outweighs potential benefits.  .    Patient Active Problem List   Diagnosis   • Uterine cancer   • Hypertension   • Depression   • Learning disability   • Abdominal pain   • Abnormal urinalysis   • Arthropathy   • B12 deficiency   • Former smoker   • Headache, migraine   • Hypothyroidism   • Microscopic hematuria   • Obesity   • Oligomenorrhea   •  "Other general symptoms and signs   • Stage 1 chronic kidney disease   • Tooth disorder   • Sore throat   • Pre-operative exam   • Morbid obesity with BMI of 50.0-59.9, adult   • Pneumonia due to COVID-19 virus     Advance Care Planning  Advance Directive is not on file.  ACP discussion was declined by the patient. Patient does not have an advance directive, declines further assistance.    Review of Systems   Constitutional: Negative for chills, fatigue and fever.   Eyes: Negative for pain.   Respiratory: Negative for chest tightness and shortness of breath.    Cardiovascular: Negative for chest pain and palpitations.   Gastrointestinal: Positive for abdominal pain (occasional). Negative for nausea and vomiting.   Genitourinary: Negative for frequency.        History kidney stones   Musculoskeletal: Negative for arthralgias.   Skin:        mrsa lesions healing  Gets a heat rash on arms when the weather warms up   Neurological: Positive for dizziness.   Psychiatric/Behavioral: Negative for suicidal ideas. The patient is nervous/anxious.         Objective    Vitals:    04/26/23 1450   BP: 128/89   BP Location: Left arm   Patient Position: Sitting   Cuff Size: Large Adult   Pulse: 73   Temp: 98.4 °F (36.9 °C)   TempSrc: Temporal   SpO2: 97%   Weight: (!) 138 kg (303 lb 9.6 oz)   Height: 162.6 cm (64\")     Estimated body mass index is 52.11 kg/m² as calculated from the following:    Height as of this encounter: 162.6 cm (64\").    Weight as of this encounter: 138 kg (303 lb 9.6 oz).    Class 3 Severe Obesity (BMI >=40). Obesity-related health conditions include the following: none. Obesity is improving with lifestyle modifications. BMI is is above average; BMI management plan is completed. We discussed portion control and increasing exercise.      Does the patient have evidence of cognitive impairment? No    Physical Exam            HEALTH RISK ASSESSMENT    Smoking Status:  Social History     Tobacco Use   Smoking Status " Former   • Packs/day: 0.25   • Years: 1.00   • Pack years: 0.25   • Types: Cigarettes   • Quit date:    • Years since quittin.3   Smokeless Tobacco Never     Alcohol Consumption:  Social History     Substance and Sexual Activity   Alcohol Use No    Comment: used to drink weekly     Fall Risk Screen:    PREMA Fall Risk Assessment was completed, and patient is at MODERATE risk for falls. Assessment completed on:2023    Depression Screenin/26/2023     3:20 PM   PHQ-2/PHQ-9 Depression Screening   Little Interest or Pleasure in Doing Things 0-->not at all   Feeling Down, Depressed or Hopeless 0-->not at all   PHQ-9: Brief Depression Severity Measure Score 0       Health Habits and Functional and Cognitive Screenin/26/2023     3:20 PM   Functional & Cognitive Status   Do you have difficulty preparing food and eating? No   Do you have difficulty bathing yourself, getting dressed or grooming yourself? No   Do you have difficulty using the toilet? No   Do you have difficulty moving around from place to place? No   Do you have trouble with steps or getting out of a bed or a chair? No   Current Diet Well Balanced Diet   Dental Exam Up to date   Eye Exam Up to date   Exercise (times per week) 7 times per week   Current Exercises Include Walking   Do you need help using the phone?  No   Are you deaf or do you have serious difficulty hearing?  No   Do you need help with transportation? No   Do you need help shopping? No   Do you need help preparing meals?  No   Do you need help with housework?  No   Do you need help with laundry? No   Do you need help taking your medications? No   Do you need help managing money? No   Do you ever drive or ride in a car without wearing a seat belt? Yes   Have you felt unusual stress, anger or loneliness in the last month? Yes   Who do you live with? Spouse   If you need help, do you have trouble finding someone available to you? Yes   Do you have difficulty  concentrating, remembering or making decisions? Yes       Age-appropriate Screening Schedule:  Refer to the list below for future screening recommendations based on patient's age, sex and/or medical conditions. Orders for these recommended tests are listed in the plan section. The patient has been provided with a written plan.    Health Maintenance   Topic Date Due   • ANNUAL WELLNESS VISIT  07/01/2019   • PAP SMEAR  01/01/2022   • COVID-19 Vaccine (1) 01/01/2028 (Originally 2/6/1991)   • INFLUENZA VACCINE  08/01/2023   • TDAP/TD VACCINES (2 - Td or Tdap) 07/25/2029   • HEPATITIS C SCREENING  Completed   • Pneumococcal Vaccine 0-64  Aged Out              Assessment & Plan   CMS Preventative Services Quick Reference  Risk Factors Identified During Encounter  Alcohol Misuse: encouraged pt to only consume a few alcoholic drinks a week- taking 2 shots daily  The above risks/problems have been discussed with the patient.  Follow up actions/plans if indicated are seen below in the Assessment/Plan Section.  Pertinent information has been shared with the patient in the After Visit Summary.    Diagnoses and all orders for this visit:    1. Medicare annual wellness visit, subsequent (Primary)  Comments:  doing well overall  cut back on alcohol intake  check labs  get pap with GYN- referral placed    Orders:  -     Comprehensive Metabolic Panel; Future  -     Hemoglobin A1c; Future  -     Lipid Panel; Future    2. History of uterine cancer  Comments:  used to see gyn oncology- cannot go to [place] anymore  will refer to GYN  due for pap  Orders:  -     Ambulatory Referral to Gynecology    3. Class 3 severe obesity without serious comorbidity with body mass index (BMI) of 50.0 to 59.9 in adult, unspecified obesity type  Comments:  work on diet and exercise  check labs  has been walking/hiking  Orders:  -     Comprehensive Metabolic Panel; Future  -     Hemoglobin A1c; Future  -     Lipid Panel; Future    4. Hyperglycemia,  unspecified  Comments:  check A1C  work on diet and exercise  Orders:  -     Hemoglobin A1c; Future    5. Dizziness  Comments:  occasional  appears to be stress related  doesnt want medication at this carito  check labs  only happens prior to going to work  Orders:  -     CBC & Differential        Follow Up:   No follow-ups on file.     An After Visit Summary and PPPS were made available to the patient.

## 2023-04-27 LAB
ALBUMIN SERPL-MCNC: 4.1 G/DL (ref 3.5–5.2)
ALBUMIN/GLOB SERPL: 1.5 G/DL
ALP SERPL-CCNC: 86 U/L (ref 39–117)
ALT SERPL W P-5'-P-CCNC: 48 U/L (ref 1–33)
ANION GAP SERPL CALCULATED.3IONS-SCNC: 9 MMOL/L (ref 5–15)
AST SERPL-CCNC: 31 U/L (ref 1–32)
BASOPHILS # BLD AUTO: 0.06 10*3/MM3 (ref 0–0.2)
BASOPHILS NFR BLD AUTO: 0.8 % (ref 0–1.5)
BILIRUB SERPL-MCNC: 0.2 MG/DL (ref 0–1.2)
BUN SERPL-MCNC: 9 MG/DL (ref 6–20)
BUN/CREAT SERPL: 11.1 (ref 7–25)
CALCIUM SPEC-SCNC: 10.1 MG/DL (ref 8.6–10.5)
CHLORIDE SERPL-SCNC: 105 MMOL/L (ref 98–107)
CHOLEST SERPL-MCNC: 212 MG/DL (ref 0–200)
CO2 SERPL-SCNC: 30 MMOL/L (ref 22–29)
CREAT SERPL-MCNC: 0.81 MG/DL (ref 0.57–1)
DEPRECATED RDW RBC AUTO: 39.9 FL (ref 37–54)
EGFRCR SERPLBLD CKD-EPI 2021: 100.3 ML/MIN/1.73
EOSINOPHIL # BLD AUTO: 0.16 10*3/MM3 (ref 0–0.4)
EOSINOPHIL NFR BLD AUTO: 2 % (ref 0.3–6.2)
ERYTHROCYTE [DISTWIDTH] IN BLOOD BY AUTOMATED COUNT: 14.4 % (ref 12.3–15.4)
GLOBULIN UR ELPH-MCNC: 2.8 GM/DL
GLUCOSE SERPL-MCNC: 106 MG/DL (ref 65–99)
HBA1C MFR BLD: 6.1 % (ref 4.8–5.6)
HCT VFR BLD AUTO: 40.7 % (ref 34–46.6)
HDLC SERPL-MCNC: 28 MG/DL (ref 40–60)
HGB BLD-MCNC: 12.8 G/DL (ref 12–15.9)
IMM GRANULOCYTES # BLD AUTO: 0.03 10*3/MM3 (ref 0–0.05)
IMM GRANULOCYTES NFR BLD AUTO: 0.4 % (ref 0–0.5)
LDLC SERPL CALC-MCNC: 154 MG/DL (ref 0–100)
LDLC/HDLC SERPL: 5.41 {RATIO}
LYMPHOCYTES # BLD AUTO: 1.3 10*3/MM3 (ref 0.7–3.1)
LYMPHOCYTES NFR BLD AUTO: 16.6 % (ref 19.6–45.3)
MCH RBC QN AUTO: 24.5 PG (ref 26.6–33)
MCHC RBC AUTO-ENTMCNC: 31.4 G/DL (ref 31.5–35.7)
MCV RBC AUTO: 77.8 FL (ref 79–97)
MONOCYTES # BLD AUTO: 0.41 10*3/MM3 (ref 0.1–0.9)
MONOCYTES NFR BLD AUTO: 5.2 % (ref 5–12)
NEUTROPHILS NFR BLD AUTO: 5.86 10*3/MM3 (ref 1.7–7)
NEUTROPHILS NFR BLD AUTO: 75 % (ref 42.7–76)
NRBC BLD AUTO-RTO: 0 /100 WBC (ref 0–0.2)
PLATELET # BLD AUTO: 353 10*3/MM3 (ref 140–450)
PMV BLD AUTO: 8.5 FL (ref 6–12)
POTASSIUM SERPL-SCNC: 4.2 MMOL/L (ref 3.5–5.2)
PROT SERPL-MCNC: 6.9 G/DL (ref 6–8.5)
RBC # BLD AUTO: 5.23 10*6/MM3 (ref 3.77–5.28)
SODIUM SERPL-SCNC: 144 MMOL/L (ref 136–145)
TRIGL SERPL-MCNC: 163 MG/DL (ref 0–150)
VLDLC SERPL-MCNC: 30 MG/DL (ref 5–40)
WBC NRBC COR # BLD: 7.82 10*3/MM3 (ref 3.4–10.8)

## 2023-05-04 ENCOUNTER — APPOINTMENT (OUTPATIENT)
Dept: GENERAL RADIOLOGY | Facility: HOSPITAL | Age: 33
End: 2023-05-04
Payer: MEDICARE

## 2023-05-04 ENCOUNTER — HOSPITAL ENCOUNTER (EMERGENCY)
Facility: HOSPITAL | Age: 33
Discharge: HOME | End: 2023-05-04
Attending: EMERGENCY MEDICINE
Payer: MEDICARE

## 2023-05-04 VITALS
TEMPERATURE: 98.5 F | OXYGEN SATURATION: 95 % | HEART RATE: 93 BPM | SYSTOLIC BLOOD PRESSURE: 137 MMHG | BODY MASS INDEX: 50.02 KG/M2 | DIASTOLIC BLOOD PRESSURE: 95 MMHG | RESPIRATION RATE: 18 BRPM | WEIGHT: 293 LBS | HEIGHT: 64 IN

## 2023-05-04 DIAGNOSIS — S43.401A SPRAIN OF RIGHT SHOULDER, UNSPECIFIED SHOULDER SPRAIN TYPE, INITIAL ENCOUNTER: Primary | ICD-10-CM

## 2023-05-04 DIAGNOSIS — M89.9 SCAPULAR DYSFUNCTION: ICD-10-CM

## 2023-05-04 PROCEDURE — 73030 X-RAY EXAM OF SHOULDER: CPT

## 2023-05-04 PROCEDURE — 71045 X-RAY EXAM CHEST 1 VIEW: CPT

## 2023-05-04 PROCEDURE — 99283 EMERGENCY DEPT VISIT LOW MDM: CPT

## 2023-05-04 RX ORDER — METAXALONE 800 MG/1
800 TABLET ORAL 3 TIMES DAILY
Status: DISCONTINUED | OUTPATIENT
Start: 2023-05-04 | End: 2023-05-04 | Stop reason: HOSPADM

## 2023-05-04 RX ORDER — METAXALONE 800 MG/1
800 TABLET ORAL 3 TIMES DAILY PRN
Qty: 15 TABLET | Refills: 0 | Status: SHIPPED | OUTPATIENT
Start: 2023-05-04

## 2023-05-04 RX ADMIN — METAXALONE 800 MG: 800 TABLET ORAL at 19:18

## 2023-05-04 NOTE — Clinical Note
[facility] EMERGENCY DEPARTMENT  [address] [place] [address]  Phone: [phone]    Cindy Thompson was seen and treated in our emergency department on 5/4/2023.  She may return to work on 05/08/2023.  No lifting over 5 lbs for approximately one week.        Thank you for choosing [facility].    Sebastian Merchant MD

## 2023-05-05 NOTE — DISCHARGE INSTRUCTIONS
Sling next 3 days  No heavy lifting greater than 20 pounds no pulling for the next 5 days  Medication as directed  You can also use Tylenol as needed for discomfort  Follow-up with orthopedist for continued symptom

## 2023-05-05 NOTE — ED PROVIDER NOTES
Subjective   History of Present Illness  32-year-old female complaining of right shoulder and scapular pain.  The patient reports that sometimes hurts to raise her arms.  She reports no paresthesias or neck discomfort.  She denies pleuritic chest pain.  She reports that she does repetitive motion.  She reports no neck pain or stiffness.  She reports no pain consistent with radicular pain        Review of Systems   Respiratory: Negative for chest tightness and shortness of breath.    Musculoskeletal: Positive for arthralgias. Negative for joint swelling, neck pain and neck stiffness.   Neurological: Negative for numbness.   All other systems reviewed and are negative.      Past Medical History:   Diagnosis Date   • Anesthesia complication     lips swollen after surgeries, ? mouth gaurd, has trouble with respirations during surgery   • Depression    • Disease of thyroid gland    • Hypertension    • Learning disabilities    • Low back pain    • Obesity    • BEBA (obstructive sleep apnea)     uses CPAP   • Snoring    • Uterine cancer     [facility]- Dr. Eva Belle       No Known Allergies    Past Surgical History:   Procedure Laterality Date   • DILATATION AND CURETTAGE     • ENDOSCOPY N/A 2/21/2020    Procedure: ESOPHAGOGASTRODUODENOSCOPY WITH BIOPSY,;  Surgeon: Elle Bill MD;  Location: [facility] ENDOSCOPY;  Service: General;  Laterality: N/A;   • GASTRIC SLEEVE LAPAROSCOPIC N/A 9/30/2020    Procedure: GASTRIC SLEEVE LAPAROSCOPIC;  Surgeon: Elle Bill MD;  Location: [facility] MAIN OR;  Service: Bariatric;  Laterality: N/A;       Family History   Problem Relation Age of Onset   • Cancer Father    • Hyperlipidemia Father    • Stroke Father    • Arthritis Father         N/A   • Depression Sister         N/A   • Learning disabilities Sister    • Diabetes Maternal Grandmother    • Cancer Maternal Grandmother         N/A   • Heart attack Maternal Grandfather    • Heart disease Maternal Grandfather    • Cancer Paternal  Grandmother         Bone Cancer   • Emphysema Paternal Grandfather    • Arthritis Mother         N/A   • Early death Brother         He  in his crib   • Miscarriages / Stillbirths Sister         Miscarriages       Social History     Socioeconomic History   • Marital status:    Tobacco Use   • Smoking status: Former     Packs/day: 0.25     Years: 1.00     Pack years: 0.25     Types: Cigarettes     Quit date:      Years since quittin.3   • Smokeless tobacco: Never   Vaping Use   • Vaping Use: Never used   Substance and Sexual Activity   • Alcohol use: No     Comment: used to drink weekly   • Drug use: No   • Sexual activity: Defer     Partners: Male     Birth control/protection: None           Objective   Physical Exam  Alert Monisha Coma Scale 15 morbidly obese   HEENT: Pupils equal and reactive to light. Conjunctivae are not injected. Normal tympanic membranes. Oropharynx and nares are normal.   Neck: Supple. Midline trachea. No JVD. No goiter.  No discomfort with neck flexion extension or rotation  Chest: Clear and equal breath sounds bilaterally, regular rate and rhythm without murmur or rub.  No subcutaneous air or crepitus.  Diffuse tenderness is noted along the scapular margin on the right there is no discrete rib tenderness   Abdomen: Positive bowel sounds, nontender, nondistended. No rebound or peritoneal signs. No CVA tenderness.   Extremities no clubbing. cyanosis or edema. Motor sensory exam is normal. The full range of motion is intact there is no AC joint discomfort no pain with pressure across the long head of the biceps the patient is able to obtain abduction at 90 and greater than 90 degrees without difficulty but does report pain with scapular motion with elevation of the arm   Skin: Warm and dry, no rashes or petechia.   Lymphatic: No regional lymphadenopathy. No calf pain, swelling or Homans sign    Procedures       Patient was placed in sling and was neurovascular intact  afterwards    ED Course      Labs Reviewed - No data to display  Medications   metaxalone (SKELAXIN) tablet 800 mg (800 mg Oral Given 5/4/23 1918)     XR Shoulder 2+ View Right    Result Date: 5/4/2023  Impression: Negative shoulder x-ray. Electronically Signed: Kendra Adan  5/4/2023 6:47 PM EDT  Workstation ID: [id number]    XR Chest 1 View    Result Date: 5/4/2023  Impression: 1.Stable enlarged cardiac silhouette. No definite acute pulmonary abnormality. 2.Two 8 mm radiodensities projecting just below the right hemidiaphragm, nonspecific and possibly external to the patient. Electronically Signed: Josefina Mendietaley  5/4/2023 6:46 PM EDT  Workstation ID: [id number]                                         Medical Decision Making  Patient will be started on diclofenac and Skelaxin.  She is encouraged to use a sling for 3 days and follow-up with orthopedist or primary care.  The patient was stable at discharge and vocalized understanding of discharge instructions worn    Amount and/or Complexity of Data Reviewed  Radiology: ordered and independent interpretation performed.      Risk  OTC drugs.  Prescription drug management.    Risk Details: Scope progression of symptomatology        Final diagnoses:   Sprain of right shoulder, unspecified shoulder sprain type, initial encounter   Scapular dysfunction       ED Disposition  ED Disposition     ED Disposition   Discharge    Condition   Stable    Comment   --             Dea Delaney, APRN  [address]  [place] [facility]  [phone]          Orthopedist Dr. Montes  [phone]             Medication List      New Prescriptions    diclofenac 50 MG EC tablet  Commonly known as: VOLTAREN  1 p.o. 3 times daily as needed pain     metaxalone 800 MG tablet  Commonly known as: SKELAXIN  Take 1 tablet by mouth 3 (Three) Times a Day As Needed for Muscle Spasms (And pain).           Where to Get Your Medications      These medications were sent to [id number] - [place], [place] - [address] AT SEC OF [address] - [phone]  - [phone] FX  [address] [place] [address]    Phone: [phone]   · diclofenac 50 MG EC tablet  · metaxalone 800 MG tablet          Sebastian Merchant MD  05/04/23 2021

## 2023-09-11 ENCOUNTER — HOSPITAL ENCOUNTER (EMERGENCY)
Facility: HOSPITAL | Age: 33
Discharge: HOME | End: 2023-09-11
Attending: EMERGENCY MEDICINE | Admitting: EMERGENCY MEDICINE
Payer: MEDICARE

## 2023-09-11 VITALS
RESPIRATION RATE: 14 BRPM | SYSTOLIC BLOOD PRESSURE: 151 MMHG | OXYGEN SATURATION: 100 % | HEART RATE: 69 BPM | HEIGHT: 64 IN | DIASTOLIC BLOOD PRESSURE: 96 MMHG | WEIGHT: 293 LBS | TEMPERATURE: 98.2 F | BODY MASS INDEX: 50.02 KG/M2

## 2023-09-11 DIAGNOSIS — R10.84 GENERALIZED ABDOMINAL PAIN: Primary | ICD-10-CM

## 2023-09-11 LAB
ANION GAP SERPL CALCULATED.3IONS-SCNC: 9 MMOL/L (ref 5–15)
BASOPHILS # BLD AUTO: 0.1 10*3/MM3 (ref 0–0.2)
BASOPHILS NFR BLD AUTO: 1.1 % (ref 0–1.5)
BILIRUB UR QL STRIP: NEGATIVE
BUN SERPL-MCNC: 10 MG/DL (ref 6–20)
BUN/CREAT SERPL: 17.2 (ref 7–25)
CALCIUM SPEC-SCNC: 9.1 MG/DL (ref 8.6–10.5)
CHLORIDE SERPL-SCNC: 103 MMOL/L (ref 98–107)
CLARITY UR: CLEAR
CO2 SERPL-SCNC: 27 MMOL/L (ref 22–29)
COLOR UR: YELLOW
CREAT SERPL-MCNC: 0.58 MG/DL (ref 0.57–1)
DEPRECATED RDW RBC AUTO: 43.3 FL (ref 37–54)
EGFRCR SERPLBLD CKD-EPI 2021: 124.3 ML/MIN/1.73
EOSINOPHIL # BLD AUTO: 0.6 10*3/MM3 (ref 0–0.4)
EOSINOPHIL NFR BLD AUTO: 7.6 % (ref 0.3–6.2)
ERYTHROCYTE [DISTWIDTH] IN BLOOD BY AUTOMATED COUNT: 15.6 % (ref 12.3–15.4)
GLUCOSE SERPL-MCNC: 143 MG/DL (ref 65–99)
GLUCOSE UR STRIP-MCNC: NEGATIVE MG/DL
HCT VFR BLD AUTO: 39.9 % (ref 34–46.6)
HGB BLD-MCNC: 12.4 G/DL (ref 12–15.9)
HGB UR QL STRIP.AUTO: NEGATIVE
HOLD SPECIMEN: NORMAL
KETONES UR QL STRIP: NEGATIVE
LEUKOCYTE ESTERASE UR QL STRIP.AUTO: NEGATIVE
LYMPHOCYTES # BLD AUTO: 1.2 10*3/MM3 (ref 0.7–3.1)
LYMPHOCYTES NFR BLD AUTO: 17 % (ref 19.6–45.3)
MCH RBC QN AUTO: 24.8 PG (ref 26.6–33)
MCHC RBC AUTO-ENTMCNC: 31.1 G/DL (ref 31.5–35.7)
MCV RBC AUTO: 79.9 FL (ref 79–97)
MONOCYTES # BLD AUTO: 0.4 10*3/MM3 (ref 0.1–0.9)
MONOCYTES NFR BLD AUTO: 5.9 % (ref 5–12)
NEUTROPHILS NFR BLD AUTO: 5 10*3/MM3 (ref 1.7–7)
NEUTROPHILS NFR BLD AUTO: 68.4 % (ref 42.7–76)
NITRITE UR QL STRIP: NEGATIVE
NRBC BLD AUTO-RTO: 0 /100 WBC (ref 0–0.2)
PH UR STRIP.AUTO: 7 [PH] (ref 5–8)
PLATELET # BLD AUTO: 334 10*3/MM3 (ref 140–450)
PMV BLD AUTO: 6.5 FL (ref 6–12)
POTASSIUM SERPL-SCNC: 3.9 MMOL/L (ref 3.5–5.2)
PROT UR QL STRIP: NEGATIVE
RBC # BLD AUTO: 4.99 10*6/MM3 (ref 3.77–5.28)
SODIUM SERPL-SCNC: 139 MMOL/L (ref 136–145)
SP GR UR STRIP: 1.02 (ref 1–1.03)
UROBILINOGEN UR QL STRIP: NORMAL
WBC NRBC COR # BLD: 7.3 10*3/MM3 (ref 3.4–10.8)
WHOLE BLOOD HOLD COAG: NORMAL

## 2023-09-11 PROCEDURE — 85025 COMPLETE CBC W/AUTO DIFF WBC: CPT | Performed by: EMERGENCY MEDICINE

## 2023-09-11 PROCEDURE — 99282 EMERGENCY DEPT VISIT SF MDM: CPT

## 2023-09-11 PROCEDURE — 80048 BASIC METABOLIC PNL TOTAL CA: CPT | Performed by: EMERGENCY MEDICINE

## 2023-09-11 PROCEDURE — 81003 URINALYSIS AUTO W/O SCOPE: CPT | Performed by: EMERGENCY MEDICINE

## 2023-09-11 RX ORDER — NAPROXEN 375 MG/1
375 TABLET ORAL 2 TIMES DAILY PRN
Qty: 14 TABLET | Refills: 0 | Status: SHIPPED | OUTPATIENT
Start: 2023-09-11

## 2023-09-12 NOTE — ED PROVIDER NOTES
Subjective   History of Present Illness  Patient is a 33-year-old female complaining of abdominal pain for the past several hours.  States the pain is much improved and nearly gone at this time.  She denies fever vomit diarrhea dysuria or other complaint    Review of Systems    Past Medical History:   Diagnosis Date    Anesthesia complication     lips swollen after surgeries, ? mouth gaurd, has trouble with respirations during surgery    Depression     Disease of thyroid gland     Hypertension     Learning disabilities     Low back pain     Obesity     BEBA (obstructive sleep apnea)     uses CPAP    Snoring     Uterine cancer     [facility]- Dr. Eva Belle       No Known Allergies    Past Surgical History:   Procedure Laterality Date    DILATATION AND CURETTAGE      ENDOSCOPY N/A 2020    Procedure: ESOPHAGOGASTRODUODENOSCOPY WITH BIOPSY,;  Surgeon: Elle Bill MD;  Location: [facility] ENDOSCOPY;  Service: General;  Laterality: N/A;    GASTRIC SLEEVE LAPAROSCOPIC N/A 2020    Procedure: GASTRIC SLEEVE LAPAROSCOPIC;  Surgeon: Elle Bill MD;  Location: [facility] MAIN OR;  Service: Bariatric;  Laterality: N/A;       Family History   Problem Relation Age of Onset    Cancer Father     Hyperlipidemia Father     Stroke Father     Arthritis Father         N/A    Depression Sister         N/A    Learning disabilities Sister     Diabetes Maternal Grandmother     Cancer Maternal Grandmother         N/A    Heart attack Maternal Grandfather     Heart disease Maternal Grandfather     Cancer Paternal Grandmother         Bone Cancer    Emphysema Paternal Grandfather     Arthritis Mother         N/A    Early death Brother         He  in his crib    Miscarriages / Stillbirths Sister         Miscarriages       Social History     Socioeconomic History    Marital status:    Tobacco Use    Smoking status: Former     Packs/day: 0.25     Years: 1.00     Pack years: 0.25     Types: Cigarettes     Quit date: 2012     Years  since quittin.7    Smokeless tobacco: Never   Vaping Use    Vaping Use: Never used   Substance and Sexual Activity    Alcohol use: No     Comment: used to drink weekly    Drug use: No    Sexual activity: Defer     Partners: Male     Birth control/protection: None           Objective   Physical Exam  Lungs are clear.  Heart has regular rhythm.  Abdomen soft with no tenderness.  She has normal bowel sounds.  Back has no tenderness.  Procedures           ED Course           Results for orders placed or performed during the hospital encounter of 23   Basic Metabolic Panel    Specimen: Blood   Result Value Ref Range    Glucose 143 (H) 65 - 99 mg/dL    BUN 10 6 - 20 mg/dL    Creatinine 0.58 0.57 - 1.00 mg/dL    Sodium 139 136 - 145 mmol/L    Potassium 3.9 3.5 - 5.2 mmol/L    Chloride 103 98 - 107 mmol/L    CO2 27.0 22.0 - 29.0 mmol/L    Calcium 9.1 8.6 - 10.5 mg/dL    BUN/Creatinine Ratio 17.2 7.0 - 25.0    Anion Gap 9.0 5.0 - 15.0 mmol/L    eGFR 124.3 >60.0 mL/min/1.73   Urinalysis With Microscopic If Indicated (No Culture) - Urine, Clean Catch    Specimen: Urine, Clean Catch   Result Value Ref Range    Color, UA Yellow Yellow, Straw    Appearance, UA Clear Clear    pH, UA 7.0 5.0 - 8.0    Specific Gravity, UA 1.021 1.005 - 1.030    Glucose, UA Negative Negative    Ketones, UA Negative Negative    Bilirubin, UA Negative Negative    Blood, UA Negative Negative    Protein, UA Negative Negative    Leuk Esterase, UA Negative Negative    Nitrite, UA Negative Negative    Urobilinogen, UA 1.0 E.U./dL 0.2 - 1.0 E.U./dL   CBC Auto Differential    Specimen: Blood   Result Value Ref Range    WBC 7.30 3.40 - 10.80 10*3/mm3    RBC 4.99 3.77 - 5.28 10*6/mm3    Hemoglobin 12.4 12.0 - 15.9 g/dL    Hematocrit 39.9 34.0 - 46.6 %    MCV 79.9 79.0 - 97.0 fL    MCH 24.8 (L) 26.6 - 33.0 pg    MCHC 31.1 (L) 31.5 - 35.7 g/dL    RDW 15.6 (H) 12.3 - 15.4 %    RDW-SD 43.3 37.0 - 54.0 fl    MPV 6.5 6.0 - 12.0 fL    Platelets 334 140 -  450 10*3/mm3    Neutrophil % 68.4 42.7 - 76.0 %    Lymphocyte % 17.0 (L) 19.6 - 45.3 %    Monocyte % 5.9 5.0 - 12.0 %    Eosinophil % 7.6 (H) 0.3 - 6.2 %    Basophil % 1.1 0.0 - 1.5 %    Neutrophils, Absolute 5.00 1.70 - 7.00 10*3/mm3    Lymphocytes, Absolute 1.20 0.70 - 3.10 10*3/mm3    Monocytes, Absolute 0.40 0.10 - 0.90 10*3/mm3    Eosinophils, Absolute 0.60 (H) 0.00 - 0.40 10*3/mm3    Basophils, Absolute 0.10 0.00 - 0.20 10*3/mm3    nRBC 0.0 0.0 - 0.2 /100 WBC   Gold Top - SST   Result Value Ref Range    Extra Tube Hold for add-ons.    Light Blue Top   Result Value Ref Range    Extra Tube Hold for add-ons.      No radiology results for the last day                                    Medical Decision Making  Metabolic panel shows no renal insufficiency or electrolyte abnormality.  Patient has no leukocytosis no left shift.  UA was normal as well.  She will be discharged nonspecific abdominal pain.  She will be placed on Naprosyn and will see MD for recheck.    Amount and/or Complexity of Data Reviewed  Labs: ordered. Decision-making details documented in ED Course.    Risk  Prescription drug management.        Final diagnoses:   Generalized abdominal pain       ED Disposition  ED Disposition       ED Disposition   Discharge    Condition   Stable    Comment   --               No follow-up provider specified.       Medication List        New Prescriptions      naproxen 375 MG tablet  Commonly known as: NAPROSYN  Take 1 tablet by mouth 2 (Two) Times a Day As Needed for Moderate Pain.               Where to Get Your Medications        Information about where to get these medications is not yet available    Ask your nurse or doctor about these medications  naproxen 375 MG tablet            Nithin Galan MD  09/11/23 2020

## 2023-10-26 ENCOUNTER — OFFICE VISIT (OUTPATIENT)
Dept: FAMILY MEDICINE CLINIC | Facility: CLINIC | Age: 33
End: 2023-10-26
Payer: MEDICARE

## 2023-10-26 ENCOUNTER — LAB (OUTPATIENT)
Dept: FAMILY MEDICINE CLINIC | Facility: CLINIC | Age: 33
End: 2023-10-26
Payer: MEDICARE

## 2023-10-26 VITALS
BODY MASS INDEX: 52.35 KG/M2 | HEART RATE: 73 BPM | OXYGEN SATURATION: 94 % | WEIGHT: 293 LBS | SYSTOLIC BLOOD PRESSURE: 134 MMHG | DIASTOLIC BLOOD PRESSURE: 83 MMHG | TEMPERATURE: 98 F

## 2023-10-26 DIAGNOSIS — R73.03 PRE-DIABETES: ICD-10-CM

## 2023-10-26 DIAGNOSIS — E03.9 HYPOTHYROIDISM, UNSPECIFIED TYPE: ICD-10-CM

## 2023-10-26 DIAGNOSIS — F33.1 MODERATE EPISODE OF RECURRENT MAJOR DEPRESSIVE DISORDER: ICD-10-CM

## 2023-10-26 DIAGNOSIS — I10 HYPERTENSION, UNSPECIFIED TYPE: ICD-10-CM

## 2023-10-26 DIAGNOSIS — E66.01 CLASS 3 SEVERE OBESITY WITHOUT SERIOUS COMORBIDITY WITH BODY MASS INDEX (BMI) OF 50.0 TO 59.9 IN ADULT, UNSPECIFIED OBESITY TYPE: ICD-10-CM

## 2023-10-26 DIAGNOSIS — I10 HYPERTENSION, UNSPECIFIED TYPE: Primary | ICD-10-CM

## 2023-10-26 DIAGNOSIS — Z23 NEED FOR IMMUNIZATION AGAINST INFLUENZA: ICD-10-CM

## 2023-10-26 DIAGNOSIS — A49.02 MRSA (METHICILLIN RESISTANT STAPHYLOCOCCUS AUREUS): ICD-10-CM

## 2023-10-26 DIAGNOSIS — Z85.42 HISTORY OF UTERINE CANCER: ICD-10-CM

## 2023-10-26 LAB
ALBUMIN SERPL-MCNC: 4 G/DL (ref 3.5–5.2)
ALBUMIN/GLOB SERPL: 1.4 G/DL
ALP SERPL-CCNC: 82 U/L (ref 39–117)
ALT SERPL W P-5'-P-CCNC: 13 U/L (ref 1–33)
ANION GAP SERPL CALCULATED.3IONS-SCNC: 9.4 MMOL/L (ref 5–15)
AST SERPL-CCNC: 18 U/L (ref 1–32)
BILIRUB SERPL-MCNC: 0.2 MG/DL (ref 0–1.2)
BUN SERPL-MCNC: 13 MG/DL (ref 6–20)
BUN/CREAT SERPL: 16.3 (ref 7–25)
CALCIUM SPEC-SCNC: 10 MG/DL (ref 8.6–10.5)
CHLORIDE SERPL-SCNC: 105 MMOL/L (ref 98–107)
CHOLEST SERPL-MCNC: 220 MG/DL (ref 0–200)
CO2 SERPL-SCNC: 27.6 MMOL/L (ref 22–29)
CREAT SERPL-MCNC: 0.8 MG/DL (ref 0.57–1)
EGFRCR SERPLBLD CKD-EPI 2021: 101.2 ML/MIN/1.73
GLOBULIN UR ELPH-MCNC: 2.8 GM/DL
GLUCOSE SERPL-MCNC: 115 MG/DL (ref 65–99)
HBA1C MFR BLD: 5.8 % (ref 4.8–5.6)
HDLC SERPL-MCNC: 31 MG/DL (ref 40–60)
LDLC SERPL CALC-MCNC: 161 MG/DL (ref 0–100)
LDLC/HDLC SERPL: 5.1 {RATIO}
POTASSIUM SERPL-SCNC: 4.2 MMOL/L (ref 3.5–5.2)
PROT SERPL-MCNC: 6.8 G/DL (ref 6–8.5)
SODIUM SERPL-SCNC: 142 MMOL/L (ref 136–145)
TRIGL SERPL-MCNC: 154 MG/DL (ref 0–150)
TSH SERPL DL<=0.05 MIU/L-ACNC: 2.36 UIU/ML (ref 0.27–4.2)
VLDLC SERPL-MCNC: 28 MG/DL (ref 5–40)

## 2023-10-26 PROCEDURE — 83036 HEMOGLOBIN GLYCOSYLATED A1C: CPT | Performed by: NURSE PRACTITIONER

## 2023-10-26 PROCEDURE — 80053 COMPREHEN METABOLIC PANEL: CPT | Performed by: NURSE PRACTITIONER

## 2023-10-26 PROCEDURE — 1160F RVW MEDS BY RX/DR IN RCRD: CPT | Performed by: NURSE PRACTITIONER

## 2023-10-26 PROCEDURE — G0008 ADMIN INFLUENZA VIRUS VAC: HCPCS | Performed by: NURSE PRACTITIONER

## 2023-10-26 PROCEDURE — 3079F DIAST BP 80-89 MM HG: CPT | Performed by: NURSE PRACTITIONER

## 2023-10-26 PROCEDURE — 99214 OFFICE O/P EST MOD 30 MIN: CPT | Performed by: NURSE PRACTITIONER

## 2023-10-26 PROCEDURE — 3075F SYST BP GE 130 - 139MM HG: CPT | Performed by: NURSE PRACTITIONER

## 2023-10-26 PROCEDURE — 90686 IIV4 VACC NO PRSV 0.5 ML IM: CPT | Performed by: NURSE PRACTITIONER

## 2023-10-26 PROCEDURE — 80061 LIPID PANEL: CPT | Performed by: NURSE PRACTITIONER

## 2023-10-26 PROCEDURE — 36415 COLL VENOUS BLD VENIPUNCTURE: CPT

## 2023-10-26 PROCEDURE — 1159F MED LIST DOCD IN RCRD: CPT | Performed by: NURSE PRACTITIONER

## 2023-10-26 PROCEDURE — 84443 ASSAY THYROID STIM HORMONE: CPT | Performed by: NURSE PRACTITIONER

## 2023-10-26 NOTE — PROGRESS NOTES
Subjective     Cindy Thompson is a 33 y.o. female.     History of Present Illness  Patient is here today for follow-up on chronic medical conditions.  Patient has a history of uterine cancer.  She did radiation  She was seeing Dr. Mccauley but has not followed up with him.   She states she doesn't have a car and doesn't have transportation  She states she hasn't seen him in 4 yrs.   She will see a GYN locally.   Patient has prediabetes.  She is working at the   Has a history of MRSA skin infections- did chlorhexidine washes.   Uses mupirocin.   She does not smoke  She is drinking 1 alcohol shot a day.    Depression-patient has a history of depression.  She had tried cutting herself previously.  Patient used to drink heavily.  She is no longer on medicine at this time.  She was previously on Wellbutrin, Lexapro, Lamictal. Denies SI or HI.     Hyperlipidemia-patient was previously on Lipitor but she stopped taking this medication.    Hypothyroidism-patient was previously on levothyroxine but has not been on meds for some time. She states she has been having some memory issues.     Hypertension-patient used to take metoprolol XL daily.  She has not been on medication. Denies CP, SOA, dizziness, HA    Labs- due  Pap smear- due- referral to GYN      Vaccines:  Flu-  Tdap-  Covid-19-    Dental exam-  Eye exam-           The following portions of the patient's history were reviewed and updated as appropriate: allergies, current medications, past family history, past medical history, past social history, past surgical history, and problem list.    Review of Systems   Constitutional:  Negative for chills, fatigue and fever.   Respiratory:  Negative for chest tightness and shortness of breath.    Cardiovascular:  Negative for chest pain and palpitations.   Gastrointestinal:  Negative for abdominal pain, constipation, diarrhea, nausea and vomiting.   Genitourinary:  Negative for dysuria and frequency.   Skin:   Negative for rash.   Neurological:  Positive for memory problem. Negative for dizziness and headache.   Psychiatric/Behavioral:  Negative for decreased concentration, self-injury, suicidal ideas and stress. The patient is not nervous/anxious.        Objective     /83 (BP Location: Left arm, Patient Position: Sitting, Cuff Size: Adult)   Pulse 73   Temp 98 °F (36.7 °C) (Oral)   Wt (!) 138 kg (305 lb)   SpO2 94%   BMI 52.35 kg/m²     Current Outpatient Medications on File Prior to Visit   Medication Sig Dispense Refill    [DISCONTINUED] atorvastatin (LIPITOR) 80 MG tablet Daily.      [DISCONTINUED] chlorhexidine (HIBICLENS) 4 % external liquid Apply to body (avoid face and genitals) in shower and rinse daily for 5 days. Repeat 2 times per month for 6 mo. 3790 mL 1    [DISCONTINUED] Cholecalciferol 125 MCG (5000 UT) tablet Take 1 tablet by mouth Daily.      [DISCONTINUED] diclofenac (VOLTAREN) 50 MG EC tablet 1 p.o. 3 times daily as needed pain 20 tablet 0    [DISCONTINUED] metaxalone (SKELAXIN) 800 MG tablet Take 1 tablet by mouth 3 (Three) Times a Day As Needed for Muscle Spasms (And pain). 15 tablet 0    [DISCONTINUED] mupirocin (BACTROBAN) 2 % ointment Apply a small amount in each nostril 2 times daily for 5 days. Repeat 2x/mo for 6 mo 30 g 1    [DISCONTINUED] naproxen (NAPROSYN) 375 MG tablet Take 1 tablet by mouth 2 (Two) Times a Day As Needed for Moderate Pain. 14 tablet 0     No current facility-administered medications on file prior to visit.                 Physical Exam  Vitals reviewed.   Constitutional:       General: She is not in acute distress.     Appearance: Normal appearance. She is well-developed. She is obese. She is not diaphoretic.   HENT:      Head: Normocephalic and atraumatic.   Eyes:      General:         Right eye: No discharge.         Left eye: No discharge.      Extraocular Movements: Extraocular movements intact.      Conjunctiva/sclera: Conjunctivae normal.   Cardiovascular:       Rate and Rhythm: Normal rate and regular rhythm.      Heart sounds: No murmur heard.  Pulmonary:      Effort: Pulmonary effort is normal. No respiratory distress.      Breath sounds: Normal breath sounds. No wheezing or rales.   Abdominal:      General: Bowel sounds are normal.      Palpations: Abdomen is soft.   Musculoskeletal:         General: Normal range of motion.      Cervical back: Normal range of motion.   Skin:     General: Skin is warm and dry.   Neurological:      General: No focal deficit present.      Mental Status: She is alert and oriented to person, place, and time.   Psychiatric:         Mood and Affect: Mood normal.         Behavior: Behavior normal.         Thought Content: Thought content normal.         Judgment: Judgment normal.           Assessment & Plan     Diagnoses and all orders for this visit:    1. Hypertension, unspecified type (Primary)  Comments:  stable  not on meds  work on diet and exercise  Orders:  -     Comprehensive Metabolic Panel; Future  -     Lipid Panel; Future    2. Class 3 severe obesity without serious comorbidity with body mass index (BMI) of 50.0 to 59.9 in adult, unspecified obesity type  Comments:  no change  work on diet and exercise  check labs    3. Hypothyroidism, unspecified type  Comments:  hasnt been taking med  check levels  treat accordingly  Orders:  -     TSH; Future    4. Moderate episode of recurrent major depressive disorder  Comments:  stable  not on meds  denies SI or HI    5. Pre-diabetes  Comments:  check A1C  work on diet and exercise  Orders:  -     Hemoglobin A1c; Future    6. History of uterine cancer  Comments:  hasnt followed up with GYN onc  cant drive to [place]  will refer to local GYN for screening  Orders:  -     Ambulatory Referral to Gynecology    7. MRSA (methicillin resistant Staphylococcus aureus)  Comments:  mupirocin prn  Orders:  -     mupirocin (BACTROBAN) 2 % ointment; Apply a small amount to open wounds 2 times daily  for 5 days if outbreak  Dispense: 30 g; Refill: 1    8. Need for immunization against influenza  -     Fluzone >6 Months ([id number])

## 2023-10-26 NOTE — PATIENT INSTRUCTIONS
Work on diet and exercise  Check labs  Mupirocin ointment as needed for skin infection  Referral to GYN

## 2024-01-04 ENCOUNTER — APPOINTMENT (OUTPATIENT)
Dept: GENERAL RADIOLOGY | Facility: HOSPITAL | Age: 34
End: 2024-01-04
Payer: MEDICARE

## 2024-01-04 ENCOUNTER — HOSPITAL ENCOUNTER (EMERGENCY)
Facility: HOSPITAL | Age: 34
Discharge: HOME | End: 2024-01-04
Attending: EMERGENCY MEDICINE
Payer: MEDICARE

## 2024-01-04 VITALS
HEIGHT: 64 IN | RESPIRATION RATE: 17 BRPM | HEART RATE: 76 BPM | DIASTOLIC BLOOD PRESSURE: 82 MMHG | BODY MASS INDEX: 50.02 KG/M2 | WEIGHT: 293 LBS | OXYGEN SATURATION: 96 % | SYSTOLIC BLOOD PRESSURE: 123 MMHG | TEMPERATURE: 98.2 F

## 2024-01-04 DIAGNOSIS — S63.501A SPRAIN OF RIGHT WRIST, INITIAL ENCOUNTER: ICD-10-CM
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
## 2024-01-04 DIAGNOSIS — R07.81 RIB PAIN ON RIGHT SIDE: ICD-10-CM

## 2024-01-04 PROCEDURE — 73110 X-RAY EXAM OF WRIST: CPT

## 2024-01-04 PROCEDURE — 71101 X-RAY EXAM UNILAT RIBS/CHEST: CPT

## 2024-01-04 PROCEDURE — 99283 EMERGENCY DEPT VISIT LOW MDM: CPT

## 2024-01-04 RX ORDER — IBUPROFEN 600 MG/1
600 TABLET ORAL ONCE
Status: COMPLETED | OUTPATIENT
Start: 2024-01-04 | End: 2024-01-04

## 2024-01-04 RX ORDER — METHOCARBAMOL 500 MG/1
500 TABLET, FILM COATED ORAL ONCE
Status: COMPLETED | OUTPATIENT
Start: 2024-01-04 | End: 2024-01-04

## 2024-01-04 RX ORDER — DICLOFENAC SODIUM 75 MG/1
75 TABLET, DELAYED RELEASE ORAL 2 TIMES DAILY PRN
Qty: 20 TABLET | Refills: 0 | Status: SHIPPED | OUTPATIENT
Start: 2024-01-04

## 2024-01-04 RX ORDER — ACETAMINOPHEN 500 MG
1000 TABLET ORAL ONCE
Status: COMPLETED | OUTPATIENT
Start: 2024-01-04 | End: 2024-01-04

## 2024-01-04 RX ADMIN — METHOCARBAMOL 500 MG: 500 TABLET ORAL at 16:26

## 2024-01-04 RX ADMIN — ACETAMINOPHEN 1000 MG: 500 TABLET ORAL at 16:26

## 2024-01-04 RX ADMIN — IBUPROFEN 600 MG: 600 TABLET, FILM COATED ORAL at 16:26

## 2024-01-04 NOTE — DISCHARGE INSTRUCTIONS
Use Voltaren as needed for discomfort  Do not mix with Motrin ibuprofen Advil or Aleve    Follow-up with your primary care provider in 10 days if still painful return if worsening symptoms

## 2024-01-04 NOTE — Clinical Note
[facility] EMERGENCY DEPARTMENT  [address] [place] [address]  Phone: [phone]    Cindy Thompson was seen and treated in our emergency department on 1/4/2024.  She may return to work on 01/06/2024.         Thank you for choosing [facility].    Cindy Arellano RN
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
## 2024-03-19 ENCOUNTER — APPOINTMENT (OUTPATIENT)
Dept: CT IMAGING | Facility: HOSPITAL | Age: 34
End: 2024-03-19
Payer: MEDICARE

## 2024-03-19 ENCOUNTER — HOSPITAL ENCOUNTER (EMERGENCY)
Facility: HOSPITAL | Age: 34
Discharge: HOME | End: 2024-03-19
Attending: EMERGENCY MEDICINE | Admitting: EMERGENCY MEDICINE
Payer: MEDICARE

## 2024-03-19 VITALS
BODY MASS INDEX: 50.02 KG/M2 | HEART RATE: 76 BPM | RESPIRATION RATE: 20 BRPM | SYSTOLIC BLOOD PRESSURE: 161 MMHG | TEMPERATURE: 97 F | WEIGHT: 293 LBS | DIASTOLIC BLOOD PRESSURE: 95 MMHG | OXYGEN SATURATION: 97 % | HEIGHT: 64 IN

## 2024-03-19 DIAGNOSIS — R55 SYNCOPE AND COLLAPSE: Primary | ICD-10-CM

## 2024-03-19 DIAGNOSIS — S09.90XA CLOSED HEAD INJURY, INITIAL ENCOUNTER: ICD-10-CM

## 2024-03-19 DIAGNOSIS — R19.7 DIARRHEA, UNSPECIFIED TYPE: ICD-10-CM

## 2024-03-19 DIAGNOSIS — R11.2 NAUSEA AND VOMITING, UNSPECIFIED VOMITING TYPE: ICD-10-CM

## 2024-03-19 LAB
ALBUMIN SERPL-MCNC: 3.8 G/DL (ref 3.5–5.2)
ALBUMIN/GLOB SERPL: 1.4 G/DL
ALP SERPL-CCNC: 78 U/L (ref 39–117)
ALT SERPL W P-5'-P-CCNC: 14 U/L (ref 1–33)
ANION GAP SERPL CALCULATED.3IONS-SCNC: 9 MMOL/L (ref 5–15)
AST SERPL-CCNC: 15 U/L (ref 1–32)
B-HCG UR QL: NEGATIVE
BASOPHILS # BLD AUTO: 0.02 10*3/MM3 (ref 0–0.2)
BASOPHILS NFR BLD AUTO: 0.3 % (ref 0–1.5)
BILIRUB SERPL-MCNC: 0.3 MG/DL (ref 0–1.2)
BILIRUB UR QL STRIP: NEGATIVE
BUN SERPL-MCNC: 12 MG/DL (ref 6–20)
BUN/CREAT SERPL: 17.4 (ref 7–25)
CALCIUM SPEC-SCNC: 8.6 MG/DL (ref 8.6–10.5)
CHLORIDE SERPL-SCNC: 105 MMOL/L (ref 98–107)
CLARITY UR: ABNORMAL
CO2 SERPL-SCNC: 27 MMOL/L (ref 22–29)
COLOR UR: YELLOW
CREAT SERPL-MCNC: 0.69 MG/DL (ref 0.57–1)
DEPRECATED RDW RBC AUTO: 45.6 FL (ref 37–54)
EGFRCR SERPLBLD CKD-EPI 2021: 119.2 ML/MIN/1.73
EOSINOPHIL # BLD AUTO: 0.09 10*3/MM3 (ref 0–0.4)
EOSINOPHIL NFR BLD AUTO: 1.4 % (ref 0.3–6.2)
ERYTHROCYTE [DISTWIDTH] IN BLOOD BY AUTOMATED COUNT: 15.1 % (ref 12.3–15.4)
FLUAV SUBTYP SPEC NAA+PROBE: NOT DETECTED
FLUBV RNA ISLT QL NAA+PROBE: NOT DETECTED
GLOBULIN UR ELPH-MCNC: 2.8 GM/DL
GLUCOSE SERPL-MCNC: 112 MG/DL (ref 65–99)
GLUCOSE UR STRIP-MCNC: NEGATIVE MG/DL
HCT VFR BLD AUTO: 41.3 % (ref 34–46.6)
HGB BLD-MCNC: 12.3 G/DL (ref 12–15.9)
HGB UR QL STRIP.AUTO: NEGATIVE
IMM GRANULOCYTES # BLD AUTO: 0.01 10*3/MM3 (ref 0–0.05)
IMM GRANULOCYTES NFR BLD AUTO: 0.2 % (ref 0–0.5)
KETONES UR QL STRIP: ABNORMAL
LEUKOCYTE ESTERASE UR QL STRIP.AUTO: NEGATIVE
LIPASE SERPL-CCNC: 14 U/L (ref 13–60)
LYMPHOCYTES # BLD AUTO: 1.09 10*3/MM3 (ref 0.7–3.1)
LYMPHOCYTES NFR BLD AUTO: 16.8 % (ref 19.6–45.3)
MCH RBC QN AUTO: 24.6 PG (ref 26.6–33)
MCHC RBC AUTO-ENTMCNC: 29.8 G/DL (ref 31.5–35.7)
MCV RBC AUTO: 82.4 FL (ref 79–97)
MONOCYTES # BLD AUTO: 0.36 10*3/MM3 (ref 0.1–0.9)
MONOCYTES NFR BLD AUTO: 5.6 % (ref 5–12)
NEUTROPHILS NFR BLD AUTO: 4.9 10*3/MM3 (ref 1.7–7)
NEUTROPHILS NFR BLD AUTO: 75.7 % (ref 42.7–76)
NITRITE UR QL STRIP: NEGATIVE
NRBC BLD AUTO-RTO: 0 /100 WBC (ref 0–0.2)
NT-PROBNP SERPL-MCNC: 51.9 PG/ML (ref 0–450)
PH UR STRIP.AUTO: 5.5 [PH] (ref 5–8)
PLATELET # BLD AUTO: 266 10*3/MM3 (ref 140–450)
PMV BLD AUTO: 8.2 FL (ref 6–12)
POTASSIUM SERPL-SCNC: 3.7 MMOL/L (ref 3.5–5.2)
PROT SERPL-MCNC: 6.6 G/DL (ref 6–8.5)
PROT UR QL STRIP: ABNORMAL
RBC # BLD AUTO: 5.01 10*6/MM3 (ref 3.77–5.28)
SARS-COV-2 RNA RESP QL NAA+PROBE: NOT DETECTED
SODIUM SERPL-SCNC: 141 MMOL/L (ref 136–145)
SP GR UR STRIP: 1.03 (ref 1–1.03)
TROPONIN T SERPL HS-MCNC: <6 NG/L
UROBILINOGEN UR QL STRIP: ABNORMAL
WBC NRBC COR # BLD AUTO: 6.47 10*3/MM3 (ref 3.4–10.8)

## 2024-03-19 PROCEDURE — 25010000002 ONDANSETRON PER 1 MG: Performed by: EMERGENCY MEDICINE

## 2024-03-19 PROCEDURE — 81003 URINALYSIS AUTO W/O SCOPE: CPT | Performed by: NURSE PRACTITIONER

## 2024-03-19 PROCEDURE — 70450 CT HEAD/BRAIN W/O DYE: CPT

## 2024-03-19 PROCEDURE — 99284 EMERGENCY DEPT VISIT MOD MDM: CPT

## 2024-03-19 PROCEDURE — 93005 ELECTROCARDIOGRAM TRACING: CPT | Performed by: NURSE PRACTITIONER

## 2024-03-19 PROCEDURE — 96374 THER/PROPH/DIAG INJ IV PUSH: CPT

## 2024-03-19 PROCEDURE — 80053 COMPREHEN METABOLIC PANEL: CPT | Performed by: NURSE PRACTITIONER

## 2024-03-19 PROCEDURE — 81025 URINE PREGNANCY TEST: CPT | Performed by: NURSE PRACTITIONER

## 2024-03-19 PROCEDURE — 87636 SARSCOV2 & INF A&B AMP PRB: CPT | Performed by: NURSE PRACTITIONER

## 2024-03-19 PROCEDURE — 85025 COMPLETE CBC W/AUTO DIFF WBC: CPT | Performed by: NURSE PRACTITIONER

## 2024-03-19 PROCEDURE — 84484 ASSAY OF TROPONIN QUANT: CPT | Performed by: NURSE PRACTITIONER

## 2024-03-19 PROCEDURE — 83690 ASSAY OF LIPASE: CPT | Performed by: NURSE PRACTITIONER

## 2024-03-19 PROCEDURE — 83880 ASSAY OF NATRIURETIC PEPTIDE: CPT | Performed by: EMERGENCY MEDICINE

## 2024-03-19 RX ORDER — SODIUM CHLORIDE 0.9 % (FLUSH) 0.9 %
10 SYRINGE (ML) INJECTION AS NEEDED
Status: DISCONTINUED | OUTPATIENT
Start: 2024-03-19 | End: 2024-03-19 | Stop reason: HOSPADM

## 2024-03-19 RX ORDER — ONDANSETRON 2 MG/ML
4 INJECTION INTRAMUSCULAR; INTRAVENOUS ONCE
Status: COMPLETED | OUTPATIENT
Start: 2024-03-19 | End: 2024-03-19

## 2024-03-19 RX ORDER — ONDANSETRON 4 MG/1
4 TABLET, ORALLY DISINTEGRATING ORAL EVERY 8 HOURS PRN
Qty: 15 TABLET | Refills: 0 | Status: SHIPPED | OUTPATIENT
Start: 2024-03-19

## 2024-03-19 RX ADMIN — ONDANSETRON 4 MG: 2 INJECTION INTRAMUSCULAR; INTRAVENOUS at 17:57

## 2024-03-19 NOTE — ED PROVIDER NOTES
Subjective     Provider in Triage Note  Due to significant overcrowding in the emergency department patient was initially seen and evaluated in triage.  Provider in triage recommended patient placement in the treatment area to initiate therapy and movement to an ER bed as soon as possible.    Patient presents today with complaints of an episode of syncope yesterday.  She states she was in the restroom and passed out when she woke up she had diarrhea on the floor.  No black or bloody stools.  She states she thought she was feeling better but woke up this morning still feeling nauseous and having some vomiting.  Some diffuse low abdominal cramping and discomfort intermittently.  No fever.  No blood thinner use.  No headache unilateral weakness or deficit chest pain or shortness of breath.      History of Present Illness  Agree with above unless otherwise noted  Review of Systems  See HPI and PAT note  Past Medical History:   Diagnosis Date    Anesthesia complication     lips swollen after surgeries, ? mouth gaurd, has trouble with respirations during surgery    Depression     Disease of thyroid gland     Hypertension     Learning disabilities     Low back pain     Obesity     BEBA (obstructive sleep apnea)     uses CPAP    Snoring     Uterine cancer     [facility]- Dr. Eva Belle       No Known Allergies    Past Surgical History:   Procedure Laterality Date    DILATATION AND CURETTAGE      ENDOSCOPY N/A 2/21/2020    Procedure: ESOPHAGOGASTRODUODENOSCOPY WITH BIOPSY,;  Surgeon: Elle Bill MD;  Location: [facility] ENDOSCOPY;  Service: General;  Laterality: N/A;    GASTRIC SLEEVE LAPAROSCOPIC N/A 9/30/2020    Procedure: GASTRIC SLEEVE LAPAROSCOPIC;  Surgeon: Elle Bill MD;  Location: [facility] MAIN OR;  Service: Bariatric;  Laterality: N/A;       Family History   Problem Relation Age of Onset    Cancer Father     Hyperlipidemia Father     Stroke Father     Arthritis Father         N/A    Depression Sister         N/A     "Learning disabilities Sister     Diabetes Maternal Grandmother     Cancer Maternal Grandmother         N/A    Heart attack Maternal Grandfather     Heart disease Maternal Grandfather     Cancer Paternal Grandmother         Bone Cancer    Emphysema Paternal Grandfather     Arthritis Mother         N/A    Early death Brother         He  in his crib    Miscarriages / Stillbirths Sister         Miscarriages       Social History     Socioeconomic History    Marital status:    Tobacco Use    Smoking status: Former     Current packs/day: 0.00     Average packs/day: 0.3 packs/day for 1 year (0.3 ttl pk-yrs)     Types: Cigarettes     Start date:      Quit date:      Years since quittin.2    Smokeless tobacco: Never   Vaping Use    Vaping status: Never Used   Substance and Sexual Activity    Alcohol use: No     Comment: used to drink weekly    Drug use: Yes     Frequency: 4.0 times per week     Types: Marijuana     Comment: for sleep and pain gummies    Sexual activity: Defer     Partners: Male     Birth control/protection: None           Objective   Physical Exam  No acute distress, regular rate and rhythm, no tachypnea or increased work of breathing, clear auscultation bilaterally, abdomen soft and nontender, no rebound or guarding, elevated BMI, PERRLA, EOMI, abrasion over left forehead.  Procedures           ED Course      /95 (BP Location: Left arm, Patient Position: Standing)   Pulse 76   Temp 97 °F (36.1 °C) (Temporal)   Resp 20   Ht 162.6 cm (64\")   Wt (!) 142 kg (312 lb 9.8 oz)   SpO2 97%   BMI 53.66 kg/m²   Labs Reviewed   COMPREHENSIVE METABOLIC PANEL - Abnormal; Notable for the following components:       Result Value    Glucose 112 (*)     All other components within normal limits    Narrative:     GFR Normal >60  Chronic Kidney Disease <60  Kidney Failure <15     URINALYSIS W/ MICROSCOPIC IF INDICATED (NO CULTURE) - Abnormal; Notable for the following components:    " Appearance, UA Hazy (*)     Ketones, UA Trace (*)     Protein, UA Trace (*)     All other components within normal limits    Narrative:     Urine microscopic not indicated.   CBC WITH AUTO DIFFERENTIAL - Abnormal; Notable for the following components:    MCH 24.6 (*)     MCHC 29.8 (*)     Lymphocyte % 16.8 (*)     All other components within normal limits   COVID-19 AND FLU A/B, NP SWAB IN TRANSPORT MEDIA 1 HR TAT - Normal    Narrative:     Fact sheet for providers: https://www.fda.gov/media/983680/download    Fact sheet for patients: https://www.fda.gov/media/349404/download    Test performed by PCR.   LIPASE - Normal   PREGNANCY, URINE - Normal   SINGLE HSTROPONIN T - Normal    Narrative:     High Sensitive Troponin T Reference Range:  <14.0 ng/L- Negative Female for AMI  <22.0 ng/L- Negative Male for AMI  >=14 - Abnormal Female indicating possible myocardial injury.  >=22 - Abnormal Male indicating possible myocardial injury.   Clinicians would have to utilize clinical acumen, EKG, Troponin, and serial changes to determine if it is an Acute Myocardial Infarction or myocardial injury due to an underlying chronic condition.        BNP (IN-HOUSE) - Normal    Narrative:     This assay is used as an aid in the diagnosis of individuals suspected of having heart failure. It can be used as an aid in the diagnosis of acute decompensated heart failure (ADHF) in patients presenting with signs and symptoms of ADHF to the emergency department (ED). In addition, NT-proBNP of <300 pg/mL indicates ADHF is not likely.    Age Range Result Interpretation  NT-proBNP Concentration (pg/mL:      <50             Positive            >450                   Gray                 300-450                    Negative             <300    50-75           Positive            >900                  Gray                300-900                  Negative            <300      >75             Positive            >1800                  Wong                 300-1800                  Negative            <300   CBC AND DIFFERENTIAL    Narrative:     The following orders were created for panel order CBC & Differential.  Procedure                               Abnormality         Status                     ---------                               -----------         ------                     CBC Auto Differential[[id number]]        Abnormal            Final result                 Please view results for these tests on the individual orders.     Medications   ondansetron (ZOFRAN) injection 4 mg (4 mg Intravenous Given 3/19/24 8791)     CT Head Without Contrast    Result Date: 3/19/2024  Impression: No acute intracranial abnormality Electronically Signed: Michel Castro MD  3/19/2024 5:25 PM EDT  Workstation ID: [id number]                                          Medical Decision Making  Problems Addressed:  Closed head injury, initial encounter: complicated acute illness or injury  Diarrhea, unspecified type: complicated acute illness or injury  Nausea and vomiting, unspecified vomiting type: complicated acute illness or injury  Syncope and collapse: complicated acute illness or injury    Amount and/or Complexity of Data Reviewed  Labs: ordered.  Radiology: ordered.  ECG/medicine tests: ordered.    Risk  Prescription drug management.    EKG interpretation: 3:16 PM, rate 78, normal sinus rhythm, normal axis, normal intervals, no evidence of Linda-Parkinson-White, heart block, ARVD, Brugada, prolonged QTc.    My interpretation of CT head is no acute intracranial hemorrhage.  See above radiology interpretation.    Patient nontoxic-appearing here.  Reassuring labs and exam.  Reassuring vitals.  EKG unremarkable.  Will DC.    Final diagnoses:   Syncope and collapse   Closed head injury, initial encounter   Nausea and vomiting, unspecified vomiting type   Diarrhea, unspecified type       ED Disposition  ED Disposition       ED Disposition   Discharge    Condition   Stable     Comment   --               Dea Delaney, APRN  [address]    [place] [address] [phone]    In 3 days           Medication List        New Prescriptions      ondansetron ODT 4 MG disintegrating tablet  Commonly known as: ZOFRAN-ODT  Place 1 tablet on the tongue Every 8 (Eight) Hours As Needed for Nausea or Vomiting.               Where to Get Your Medications        These medications were sent to [id number] - [place], [place] - [address] AT [address] & [address] - [phone]  - [phone] FX  [address] [place] [address]      Phone: [phone]   ondansetron ODT 4 MG disintegrating tablet            Kleber Huffman MD  03/20/24 0127

## 2024-03-19 NOTE — ED NOTES
Patient evaluated by provider and will return to the waiting room with Intravenous line in place. Patient has been instructed not to inject anything into the IV, or leave premesis with IV in place. Patient pending further evaluation, treatment, testing / monitoring.

## 2024-03-20 LAB
QT INTERVAL: 377 MS
QTC INTERVAL: 429 MS
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
## 2024-06-28 ENCOUNTER — TELEPHONE (OUTPATIENT)
Dept: FAMILY MEDICINE CLINIC | Facility: CLINIC | Age: 34
End: 2024-06-28

## 2024-06-28 ENCOUNTER — LAB (OUTPATIENT)
Dept: FAMILY MEDICINE CLINIC | Facility: CLINIC | Age: 34
End: 2024-06-28
Payer: MEDICARE

## 2024-06-28 ENCOUNTER — OFFICE VISIT (OUTPATIENT)
Dept: FAMILY MEDICINE CLINIC | Facility: CLINIC | Age: 34
End: 2024-06-28
Payer: MEDICARE

## 2024-06-28 VITALS
BODY MASS INDEX: 51.91 KG/M2 | OXYGEN SATURATION: 96 % | WEIGHT: 293 LBS | DIASTOLIC BLOOD PRESSURE: 86 MMHG | SYSTOLIC BLOOD PRESSURE: 125 MMHG | HEART RATE: 85 BPM | HEIGHT: 63 IN | TEMPERATURE: 98.2 F

## 2024-06-28 DIAGNOSIS — R73.03 PREDIABETES: ICD-10-CM

## 2024-06-28 DIAGNOSIS — C55 MALIGNANT NEOPLASM OF UTERUS, UNSPECIFIED SITE: ICD-10-CM

## 2024-06-28 DIAGNOSIS — Z00.00 MEDICARE ANNUAL WELLNESS VISIT, SUBSEQUENT: Primary | ICD-10-CM

## 2024-06-28 DIAGNOSIS — Z00.00 MEDICARE ANNUAL WELLNESS VISIT, SUBSEQUENT: ICD-10-CM

## 2024-06-28 DIAGNOSIS — E03.9 HYPOTHYROIDISM, UNSPECIFIED TYPE: Chronic | ICD-10-CM

## 2024-06-28 DIAGNOSIS — F32.A DEPRESSION, UNSPECIFIED DEPRESSION TYPE: ICD-10-CM

## 2024-06-28 DIAGNOSIS — I10 PRIMARY HYPERTENSION: Chronic | ICD-10-CM

## 2024-06-28 DIAGNOSIS — R21 RASH: ICD-10-CM

## 2024-06-28 LAB
ALBUMIN SERPL-MCNC: 3.9 G/DL (ref 3.5–5.2)
ALBUMIN/GLOB SERPL: 1.4 G/DL
ALP SERPL-CCNC: 89 U/L (ref 39–117)
ALT SERPL W P-5'-P-CCNC: 16 U/L (ref 1–33)
ANION GAP SERPL CALCULATED.3IONS-SCNC: 12.1 MMOL/L (ref 5–15)
AST SERPL-CCNC: 14 U/L (ref 1–32)
BILIRUB SERPL-MCNC: 0.2 MG/DL (ref 0–1.2)
BUN SERPL-MCNC: 15 MG/DL (ref 6–20)
BUN/CREAT SERPL: 18.5 (ref 7–25)
CALCIUM SPEC-SCNC: 9.6 MG/DL (ref 8.6–10.5)
CHLORIDE SERPL-SCNC: 101 MMOL/L (ref 98–107)
CHOLEST SERPL-MCNC: 221 MG/DL (ref 0–200)
CO2 SERPL-SCNC: 27.9 MMOL/L (ref 22–29)
CREAT SERPL-MCNC: 0.81 MG/DL (ref 0.57–1)
EGFRCR SERPLBLD CKD-EPI 2021: 99.7 ML/MIN/1.73
GLOBULIN UR ELPH-MCNC: 2.7 GM/DL
GLUCOSE SERPL-MCNC: 131 MG/DL (ref 65–99)
HBA1C MFR BLD: 6.1 % (ref 4.8–5.6)
HDLC SERPL-MCNC: 32 MG/DL (ref 40–60)
LDLC SERPL CALC-MCNC: 147 MG/DL (ref 0–100)
LDLC/HDLC SERPL: 4.49 {RATIO}
POTASSIUM SERPL-SCNC: 4.3 MMOL/L (ref 3.5–5.2)
PROT SERPL-MCNC: 6.6 G/DL (ref 6–8.5)
SODIUM SERPL-SCNC: 141 MMOL/L (ref 136–145)
TRIGL SERPL-MCNC: 227 MG/DL (ref 0–150)
TSH SERPL DL<=0.05 MIU/L-ACNC: 4.7 UIU/ML (ref 0.27–4.2)
VLDLC SERPL-MCNC: 42 MG/DL (ref 5–40)

## 2024-06-28 PROCEDURE — 80061 LIPID PANEL: CPT | Performed by: NURSE PRACTITIONER

## 2024-06-28 PROCEDURE — 36415 COLL VENOUS BLD VENIPUNCTURE: CPT

## 2024-06-28 PROCEDURE — 83036 HEMOGLOBIN GLYCOSYLATED A1C: CPT | Performed by: NURSE PRACTITIONER

## 2024-06-28 PROCEDURE — 84443 ASSAY THYROID STIM HORMONE: CPT | Performed by: NURSE PRACTITIONER

## 2024-06-28 PROCEDURE — 80053 COMPREHEN METABOLIC PANEL: CPT | Performed by: NURSE PRACTITIONER

## 2024-06-28 RX ORDER — SERTRALINE HYDROCHLORIDE 25 MG/1
25 TABLET, FILM COATED ORAL DAILY
Qty: 30 TABLET | Refills: 0 | Status: SHIPPED | OUTPATIENT
Start: 2024-06-28

## 2024-06-28 RX ORDER — CETIRIZINE HYDROCHLORIDE 10 MG/1
10 TABLET ORAL DAILY
Qty: 90 TABLET | Refills: 1 | Status: SHIPPED | OUTPATIENT
Start: 2024-06-28

## 2024-06-28 NOTE — PATIENT INSTRUCTIONS
Start zoloft daily  Work on diet and exercise- watch portion sizes, make plate colorful, cut back on alcohol  Check labs  Schedule appt with GYN  Start zyrtec daily  Apply steroid cream to arms 2 times daily as needed

## 2024-06-28 NOTE — TELEPHONE ENCOUNTER
Pharmacy Name: [id number] - [place], [place] - [address] AT [facility] [address] [facility] [phone] [facility] [phone] FX     Reference Number (if applicable): N/A    Pharmacy representative name: RANDOLPH    Pharmacy representative phone number: [phone]*    What medication are you calling in regards to: Hydrocortisone 2 % cream     What question does the pharmacy have: DOES NOT COME IN A 2%, BUT DOES COME IN A 2.5%.     Who is the provider that prescribed the medication: FATOUMATA HOOD    Additional notes: PLEASE CALL PHARMACY TO ADVISE IF OKAY TO CHANGE TO 2.5%.

## 2024-07-01 DIAGNOSIS — E03.9 HYPOTHYROIDISM, UNSPECIFIED TYPE: Primary | ICD-10-CM

## 2024-07-26 ENCOUNTER — APPOINTMENT (OUTPATIENT)
Dept: GENERAL RADIOLOGY | Facility: HOSPITAL | Age: 34
End: 2024-07-26
Payer: MEDICARE

## 2024-07-26 ENCOUNTER — APPOINTMENT (OUTPATIENT)
Dept: CT IMAGING | Facility: HOSPITAL | Age: 34
End: 2024-07-26
Payer: MEDICARE

## 2024-07-26 ENCOUNTER — HOSPITAL ENCOUNTER (EMERGENCY)
Facility: HOSPITAL | Age: 34
Discharge: HOME | End: 2024-07-26
Payer: MEDICARE

## 2024-07-26 VITALS
RESPIRATION RATE: 19 BRPM | HEIGHT: 63 IN | WEIGHT: 293 LBS | SYSTOLIC BLOOD PRESSURE: 144 MMHG | DIASTOLIC BLOOD PRESSURE: 100 MMHG | TEMPERATURE: 98.6 F | HEART RATE: 74 BPM | OXYGEN SATURATION: 92 % | BODY MASS INDEX: 51.91 KG/M2

## 2024-07-26 DIAGNOSIS — K62.89 RECTAL PAIN: ICD-10-CM

## 2024-07-26 DIAGNOSIS — R10.84 GENERALIZED ABDOMINAL PAIN: Primary | ICD-10-CM

## 2024-07-26 LAB
ALBUMIN SERPL-MCNC: 4.4 G/DL (ref 3.5–5.2)
ALBUMIN/GLOB SERPL: 1.5 G/DL
ALP SERPL-CCNC: 91 U/L (ref 39–117)
ALT SERPL W P-5'-P-CCNC: 11 U/L (ref 1–33)
ANION GAP SERPL CALCULATED.3IONS-SCNC: 10.9 MMOL/L (ref 5–15)
AST SERPL-CCNC: 18 U/L (ref 1–32)
B-HCG UR QL: NEGATIVE
BACTERIA UR QL AUTO: ABNORMAL /HPF
BASOPHILS # BLD AUTO: 0.03 10*3/MM3 (ref 0–0.2)
BASOPHILS NFR BLD AUTO: 0.4 % (ref 0–1.5)
BILIRUB SERPL-MCNC: 0.3 MG/DL (ref 0–1.2)
BILIRUB UR QL STRIP: NEGATIVE
BUN SERPL-MCNC: 17 MG/DL (ref 6–20)
BUN/CREAT SERPL: 28.8 (ref 7–25)
CALCIUM SPEC-SCNC: 9.7 MG/DL (ref 8.6–10.5)
CHLORIDE SERPL-SCNC: 103 MMOL/L (ref 98–107)
CLARITY UR: ABNORMAL
CO2 SERPL-SCNC: 30.1 MMOL/L (ref 22–29)
COLOR UR: YELLOW
CREAT SERPL-MCNC: 0.59 MG/DL (ref 0.57–1)
DEPRECATED RDW RBC AUTO: 43.8 FL (ref 37–54)
EGFRCR SERPLBLD CKD-EPI 2021: 123.7 ML/MIN/1.73
EOSINOPHIL # BLD AUTO: 0.15 10*3/MM3 (ref 0–0.4)
EOSINOPHIL NFR BLD AUTO: 1.9 % (ref 0.3–6.2)
ERYTHROCYTE [DISTWIDTH] IN BLOOD BY AUTOMATED COUNT: 14.5 % (ref 12.3–15.4)
GLOBULIN UR ELPH-MCNC: 3 GM/DL
GLUCOSE SERPL-MCNC: 127 MG/DL (ref 65–99)
GLUCOSE UR STRIP-MCNC: NEGATIVE MG/DL
HCT VFR BLD AUTO: 45.7 % (ref 34–46.6)
HGB BLD-MCNC: 13.8 G/DL (ref 12–15.9)
HGB UR QL STRIP.AUTO: NEGATIVE
HOLD SPECIMEN: NORMAL
HOLD SPECIMEN: NORMAL
HYALINE CASTS UR QL AUTO: ABNORMAL /LPF
IMM GRANULOCYTES # BLD AUTO: 0.02 10*3/MM3 (ref 0–0.05)
IMM GRANULOCYTES NFR BLD AUTO: 0.3 % (ref 0–0.5)
KETONES UR QL STRIP: NEGATIVE
LEUKOCYTE ESTERASE UR QL STRIP.AUTO: ABNORMAL
LIPASE SERPL-CCNC: 17 U/L (ref 13–60)
LYMPHOCYTES # BLD AUTO: 1.06 10*3/MM3 (ref 0.7–3.1)
LYMPHOCYTES NFR BLD AUTO: 13.7 % (ref 19.6–45.3)
MCH RBC QN AUTO: 25.4 PG (ref 26.6–33)
MCHC RBC AUTO-ENTMCNC: 30.2 G/DL (ref 31.5–35.7)
MCV RBC AUTO: 84.2 FL (ref 79–97)
MONOCYTES # BLD AUTO: 0.36 10*3/MM3 (ref 0.1–0.9)
MONOCYTES NFR BLD AUTO: 4.6 % (ref 5–12)
MUCOUS THREADS URNS QL MICRO: ABNORMAL /HPF
NEUTROPHILS NFR BLD AUTO: 6.13 10*3/MM3 (ref 1.7–7)
NEUTROPHILS NFR BLD AUTO: 79.1 % (ref 42.7–76)
NITRITE UR QL STRIP: NEGATIVE
NRBC BLD AUTO-RTO: 0 /100 WBC (ref 0–0.2)
PH UR STRIP.AUTO: 6.5 [PH] (ref 5–8)
PLATELET # BLD AUTO: 323 10*3/MM3 (ref 140–450)
PMV BLD AUTO: 8.6 FL (ref 6–12)
POTASSIUM SERPL-SCNC: 4.7 MMOL/L (ref 3.5–5.2)
PROT SERPL-MCNC: 7.4 G/DL (ref 6–8.5)
PROT UR QL STRIP: NEGATIVE
RBC # BLD AUTO: 5.43 10*6/MM3 (ref 3.77–5.28)
RBC # UR STRIP: ABNORMAL /HPF
REF LAB TEST METHOD: ABNORMAL
SODIUM SERPL-SCNC: 144 MMOL/L (ref 136–145)
SP GR UR STRIP: 1.02 (ref 1–1.03)
SQUAMOUS #/AREA URNS HPF: ABNORMAL /HPF
UROBILINOGEN UR QL STRIP: ABNORMAL
WBC # UR STRIP: ABNORMAL /HPF
WBC NRBC COR # BLD AUTO: 7.75 10*3/MM3 (ref 3.4–10.8)
WHOLE BLOOD HOLD COAG: NORMAL
WHOLE BLOOD HOLD SPECIMEN: NORMAL

## 2024-07-26 PROCEDURE — 25010000002 KETOROLAC TROMETHAMINE PER 15 MG

## 2024-07-26 PROCEDURE — 96374 THER/PROPH/DIAG INJ IV PUSH: CPT

## 2024-07-26 PROCEDURE — 81001 URINALYSIS AUTO W/SCOPE: CPT

## 2024-07-26 PROCEDURE — P9612 CATHETERIZE FOR URINE SPEC: HCPCS

## 2024-07-26 PROCEDURE — 36415 COLL VENOUS BLD VENIPUNCTURE: CPT

## 2024-07-26 PROCEDURE — 81025 URINE PREGNANCY TEST: CPT

## 2024-07-26 PROCEDURE — 99285 EMERGENCY DEPT VISIT HI MDM: CPT

## 2024-07-26 PROCEDURE — 25510000001 IOPAMIDOL PER 1 ML

## 2024-07-26 PROCEDURE — 96375 TX/PRO/DX INJ NEW DRUG ADDON: CPT

## 2024-07-26 PROCEDURE — 85025 COMPLETE CBC W/AUTO DIFF WBC: CPT

## 2024-07-26 PROCEDURE — 74018 RADEX ABDOMEN 1 VIEW: CPT

## 2024-07-26 PROCEDURE — 80053 COMPREHEN METABOLIC PANEL: CPT

## 2024-07-26 PROCEDURE — 74177 CT ABD & PELVIS W/CONTRAST: CPT

## 2024-07-26 PROCEDURE — 83690 ASSAY OF LIPASE: CPT

## 2024-07-26 PROCEDURE — 25010000002 ONDANSETRON PER 1 MG

## 2024-07-26 PROCEDURE — 25810000003 SODIUM CHLORIDE 0.9 % SOLUTION

## 2024-07-26 RX ORDER — SODIUM CHLORIDE 0.9 % (FLUSH) 0.9 %
10 SYRINGE (ML) INJECTION AS NEEDED
Status: DISCONTINUED | OUTPATIENT
Start: 2024-07-26 | End: 2024-07-27 | Stop reason: HOSPADM

## 2024-07-26 RX ORDER — ONDANSETRON 2 MG/ML
4 INJECTION INTRAMUSCULAR; INTRAVENOUS ONCE
Status: COMPLETED | OUTPATIENT
Start: 2024-07-26 | End: 2024-07-26

## 2024-07-26 RX ORDER — MINERAL OIL, PETROLATUM, PHENYLEPHRINE HCL 2.5; 140; 749 MG/G; MG/G; MG/G
1 OINTMENT TOPICAL 3 TIMES DAILY PRN
Qty: 1 EACH | Refills: 0 | Status: SHIPPED | OUTPATIENT
Start: 2024-07-26

## 2024-07-26 RX ORDER — DOCUSATE SODIUM 100 MG/1
100 CAPSULE, LIQUID FILLED ORAL 2 TIMES DAILY PRN
Qty: 10 CAPSULE | Refills: 0 | Status: SHIPPED | OUTPATIENT
Start: 2024-07-26 | End: 2024-08-05

## 2024-07-26 RX ORDER — KETOROLAC TROMETHAMINE 30 MG/ML
15 INJECTION, SOLUTION INTRAMUSCULAR; INTRAVENOUS ONCE
Status: COMPLETED | OUTPATIENT
Start: 2024-07-26 | End: 2024-07-26

## 2024-07-26 RX ADMIN — KETOROLAC TROMETHAMINE 15 MG: 30 INJECTION, SOLUTION INTRAMUSCULAR at 20:15

## 2024-07-26 RX ADMIN — ONDANSETRON 4 MG: 2 INJECTION INTRAMUSCULAR; INTRAVENOUS at 20:15

## 2024-07-26 RX ADMIN — IOPAMIDOL 100 ML: 755 INJECTION, SOLUTION INTRAVENOUS at 22:09

## 2024-07-26 RX ADMIN — SODIUM CHLORIDE 1000 ML: 9 INJECTION, SOLUTION INTRAVENOUS at 20:15

## 2024-07-26 NOTE — Clinical Note
[facility] EMERGENCY DEPARTMENT  [address] [place] [address]  Phone: [phone]    Cindy Thompson was seen and treated in our emergency department on 7/26/2024.  She may return to work on 07/28/2024.         Thank you for choosing [facility].    Maru Cantu, FATOUMATA

## 2024-07-26 NOTE — ED PROVIDER NOTES
Subjective   History of Present Illness  Patient is a pleasant 33-year-old obese  female who presents to the emergency room with complaints of constipation since yesterday.  Patient states that she typically has bowel movements daily but has had some low abdominal pain and nausea today as well.  She denies urinary symptoms.  She has had no vomiting or fever.  She takes ibuprofen as needed but does not take any other medication.  She has no known drug allergies.      Review of Systems   Constitutional:  Negative for appetite change and fever.   HENT:  Negative for congestion and sinus pain.    Respiratory:  Negative for shortness of breath.    Cardiovascular:  Negative for chest pain.   Gastrointestinal:  Positive for abdominal pain, constipation and nausea. Negative for vomiting.   Genitourinary:  Negative for dysuria.   Neurological:  Negative for headaches.   Psychiatric/Behavioral:  The patient is not nervous/anxious.    All other systems reviewed and are negative.      Past Medical History:   Diagnosis Date    Anesthesia complication     lips swollen after surgeries, ? mouth gaurd, has trouble with respirations during surgery    Depression     Disease of thyroid gland     Hypertension     Learning disabilities     Low back pain     Obesity     BEBA (obstructive sleep apnea)     uses CPAP    Snoring     Uterine cancer     [facility]- Dr. Eva Belle       No Known Allergies    Past Surgical History:   Procedure Laterality Date    DILATATION AND CURETTAGE      ENDOSCOPY N/A 2/21/2020    Procedure: ESOPHAGOGASTRODUODENOSCOPY WITH BIOPSY,;  Surgeon: Elle Bill MD;  Location: [facility] ENDOSCOPY;  Service: General;  Laterality: N/A;    GASTRIC SLEEVE LAPAROSCOPIC N/A 9/30/2020    Procedure: GASTRIC SLEEVE LAPAROSCOPIC;  Surgeon: Elle Bill MD;  Location: [facility] MAIN OR;  Service: Bariatric;  Laterality: N/A;       Family History   Problem Relation Age of Onset    Cancer Father     Hyperlipidemia Father      Stroke Father     Arthritis Father         N/A    Depression Sister         N/A    Learning disabilities Sister     Diabetes Maternal Grandmother     Cancer Maternal Grandmother         N/A    Heart attack Maternal Grandfather     Heart disease Maternal Grandfather     Cancer Paternal Grandmother         Bone Cancer    Emphysema Paternal Grandfather     Arthritis Mother         N/A    Early death Brother         He  in his crib    Miscarriages / Stillbirths Sister         Miscarriages       Social History     Socioeconomic History    Marital status:    Tobacco Use    Smoking status: Former     Current packs/day: 0.00     Average packs/day: 0.3 packs/day for 1 year (0.3 ttl pk-yrs)     Types: Cigarettes     Start date:      Quit date:      Years since quittin.5    Smokeless tobacco: Never   Vaping Use    Vaping status: Never Used   Substance and Sexual Activity    Alcohol use: No     Comment: used to drink weekly    Drug use: Yes     Frequency: 4.0 times per week     Types: Marijuana     Comment: for sleep and pain gummies    Sexual activity: Not Currently     Partners: Male     Birth control/protection: None           Objective   Physical Exam  Vitals and nursing note reviewed.   Constitutional:       General: She is not in acute distress.     Appearance: Normal appearance. She is obese. She is not ill-appearing.   HENT:      Head: Normocephalic and atraumatic.   Eyes:      Pupils: Pupils are equal, round, and reactive to light.   Cardiovascular:      Rate and Rhythm: Normal rate.      Heart sounds: Normal heart sounds. No murmur heard.  Pulmonary:      Effort: No respiratory distress.      Breath sounds: Normal breath sounds.   Abdominal:      General: Abdomen is protuberant. Bowel sounds are decreased. There is no distension.      Palpations: Abdomen is soft.      Tenderness: There is abdominal tenderness.   Neurological:      Mental Status: She is alert and oriented to person, place, and  "time.         Procedures           ED Course      BP (!) 159/101   Pulse 76   Temp 98.1 °F (36.7 °C) (Oral)   Resp 20   Ht 160 cm (63\")   Wt (!) 147 kg (324 lb 11.8 oz)   SpO2 94%   BMI 57.52 kg/m²   Labs Reviewed   COMPREHENSIVE METABOLIC PANEL - Abnormal; Notable for the following components:       Result Value    Glucose 127 (*)     CO2 30.1 (*)     BUN/Creatinine Ratio 28.8 (*)     All other components within normal limits    Narrative:     GFR Normal >60  Chronic Kidney Disease <60  Kidney Failure <15     URINALYSIS W/ MICROSCOPIC IF INDICATED (NO CULTURE) - Abnormal; Notable for the following components:    Appearance, UA Cloudy (*)     Leuk Esterase, UA Trace (*)     All other components within normal limits   CBC WITH AUTO DIFFERENTIAL - Abnormal; Notable for the following components:    RBC 5.43 (*)     MCH 25.4 (*)     MCHC 30.2 (*)     Neutrophil % 79.1 (*)     Lymphocyte % 13.7 (*)     Monocyte % 4.6 (*)     All other components within normal limits   URINALYSIS, MICROSCOPIC ONLY - Abnormal; Notable for the following components:    Mucus, UA Small/1+ (*)     All other components within normal limits   LIPASE - Normal   PREGNANCY, URINE - Normal   RAINBOW DRAW    Narrative:     The following orders were created for panel order [place] Draw.  Procedure                               Abnormality         Status                     ---------                               -----------         ------                     Green Top (Gel)[[id number]]                                  Final result               Lavender Top[[id number]]                                     Final result               Gold Top - SST[[id number]]                                   Final result               Light Blue Top[[id number]]                                   Final result                 Please view results for these tests on the individual orders.   GREEN TOP   LAVENDER TOP   GOLD TOP - SST   LIGHT BLUE TOP   CBC AND DIFFERENTIAL "    Narrative:     The following orders were created for panel order CBC & Differential.  Procedure                               Abnormality         Status                     ---------                               -----------         ------                     CBC Auto Differential[[id number]]        Abnormal            Final result                 Please view results for these tests on the individual orders.     Medications   sodium chloride 0.9 % flush 10 mL (has no administration in time range)   sodium chloride 0.9 % bolus 1,000 mL (1,000 mL Intravenous New Bag 7/26/24 2015)   ondansetron (ZOFRAN) injection 4 mg (4 mg Intravenous Given 7/26/24 2015)   ketorolac (TORADOL) injection 15 mg (15 mg Intravenous Given 7/26/24 2015)   iopamidol (ISOVUE-370) 76 % injection 100 mL (100 mL Intravenous Given 7/26/24 2209)     CT Abdomen Pelvis With Contrast    Result Date: 7/26/2024  No acute abdominal or pelvic pathology. Electronically Signed: Ayaz Dwyer MD  7/26/2024 10:15 PM EDT  Workstation ID: [id number]    XR Abdomen KUB    Result Date: 7/26/2024  Impression: No evidence of intestinal obstruction or stool impaction Electronically Signed: Quentin Rodriguez  7/26/2024 9:11 PM EDT  Workstation ID: [id number]                                          Medical Decision Making  Problems Addressed:  Generalized abdominal pain: complicated acute illness or injury  Rectal pain: complicated acute illness or injury    Amount and/or Complexity of Data Reviewed  Labs: ordered.  Radiology: ordered.    Risk  OTC drugs.  Prescription drug management.    Patient is a pleasant 33-year-old obese  female with no prior medical history who presents to the emergency room with complaints of low abdominal pain and constipation has been ongoing since yesterday.  Patient states that she has regular bowel movements and has had increasing pain with nausea.  On exam, her abdomen is soft with hypoactive bowel sounds.  She has tenderness noted in  the lower aspect of the abdomen with no CVA tenderness.  Normal S1/S2 without clicks murmurs.  No JVD.  Lungs are clear to auscultation in all fields.  Initial differentials include constipation, bowel obstruction, acute UTI, pelvic inflammatory disease.  This is not a complete list.    IV was established labs were obtained.  Patient received Zofran, Toradol and fluid bolus.  CBC is unremarkable with no leukocytosis or anemia.  CMP reveals normal kidney function and LFTs.  Acute pancreatitis ruled out with normal lipase.  hCG negative.  Her urinalysis is negative for acute UTI.  Initially a KUB was ordered and found to be unremarkable.  Given her tenderness, CT was ordered also found to be unremarkable on my review.  This did concur with radiologist.  Upon reassessment, she has remained hemodynamically stable.  Results were discussed with the patient, who remains concerned for her bowel issues.  She will be discharged with docusate and Preparation H but was encouraged to follow-up to gastroenterology for further evaluation, as well as increasing her physical activity and fluids.  She verbalized understanding and is agreeable.  No acute distress noted.  She is ambulatory upright, steadily without assistance upon discharge.    I discussed the findings with patient who voices understanding of discharge instructions, signs and symptoms requiring return to the ED; discharged improved and stable condition with follow-up for reevaluation.    Patient is aware that discharge does not mean that nothing is wrong but it indicates no emergency is present and they must continue care with follow-up as given below or physician of their choice.    This document is intended for medical expert use only.  Reading of this document by patients and/or patient's family without participating medical staff guidance may result in misinterpretation and unintended morbidity.  Any interpretation of such data is the responsibility of the patient  and/or family member responsible for the patient in concert with their primary or specialist providers, not to be left for sources of online search as such as [employer], [employer] or similar queries.  Relying on these approaches to knowledge may result in misinterpretation, misguided goals of care and even death should patient or family members try recommendations outside of the realm of professional medical care in a supervised inpatient environment.    This medical document was created using Dragon dictation system. Some errors in speech recognition may occur.    Final diagnoses:   Rectal pain   Generalized abdominal pain       ED Disposition  ED Disposition       ED Disposition   Discharge    Condition   Stable    Comment   --               Dea Delaney, APRN  [address]    [place] [address] [phone]          Ayaz Wetzel MD  [address]  [place] [address] [phone]               Medication List        New Prescriptions      docusate sodium 100 MG capsule  Commonly known as: COLACE  Take 1 capsule by mouth 2 (Two) Times a Day As Needed for Constipation for up to 10 days.     Preparation H 0.25-14-74.9 % ointment hemorrhoidal ointment  Generic drug: phenylephrine-mineral oil-petrolatum  Insert 1 Application into the rectum 3 (Three) Times a Day As Needed (rectal pain).               Where to Get Your Medications        These medications were sent to [id number] - [place], [place] - [address] - [phone]  - [phone] FX  [address] [place] [address]      Phone: [phone]   docusate sodium 100 MG capsule  Preparation H 0.25-14-74.9 % ointment hemorrhoidal ointment            Maru Cantu, APRN  07/26/24 0342

## 2024-07-26 NOTE — ED NOTES
"Pt arrives to ED c/o LLQ and RLQ abd pain. Pt c/o 7/10 pain and states she thinks she is constipated and that it hurts more to sit down. When pt asked about her last bm she states she \"isn't really sure, but she thinks it was yesterday\". Pt does c/o nausea as well, but denies any vomiting.   "

## 2024-07-27 NOTE — DISCHARGE INSTRUCTIONS
Take docusate as needed for constipation.  Use Preparation H up to 3 times daily as needed for rectal pain and discomfort.  Get plenty of fluids.  Increase your physical activity, which will increase bowel motility.    Follow-up with gastroenterology for further evaluation of abdominal and rectal pain.  Follow-up with primary care provider as needed.    Return to the ER for new or worsening symptoms.

## 2024-07-30 ENCOUNTER — PATIENT OUTREACH (OUTPATIENT)
Dept: CASE MANAGEMENT | Facility: CLINIC | Age: 34
End: 2024-07-30
Payer: MEDICARE

## 2024-07-30 ENCOUNTER — OFFICE VISIT (OUTPATIENT)
Dept: FAMILY MEDICINE CLINIC | Facility: CLINIC | Age: 34
End: 2024-07-30
Payer: MEDICARE

## 2024-07-30 ENCOUNTER — LAB (OUTPATIENT)
Dept: FAMILY MEDICINE CLINIC | Facility: CLINIC | Age: 34
End: 2024-07-30
Payer: MEDICARE

## 2024-07-30 VITALS
DIASTOLIC BLOOD PRESSURE: 95 MMHG | HEIGHT: 63 IN | HEART RATE: 85 BPM | SYSTOLIC BLOOD PRESSURE: 139 MMHG | TEMPERATURE: 97.8 F | BODY MASS INDEX: 51.91 KG/M2 | OXYGEN SATURATION: 95 % | WEIGHT: 293 LBS

## 2024-07-30 DIAGNOSIS — K59.00 CONSTIPATION, UNSPECIFIED CONSTIPATION TYPE: ICD-10-CM

## 2024-07-30 DIAGNOSIS — E03.9 HYPOTHYROIDISM, UNSPECIFIED TYPE: ICD-10-CM

## 2024-07-30 DIAGNOSIS — E66.01 MORBID OBESITY WITH BMI OF 50.0-59.9, ADULT: Primary | Chronic | ICD-10-CM

## 2024-07-30 DIAGNOSIS — F32.A DEPRESSION, UNSPECIFIED DEPRESSION TYPE: Primary | ICD-10-CM

## 2024-07-30 LAB
T3FREE SERPL-MCNC: 2.45 PG/ML (ref 2–4.4)
T4 FREE SERPL-MCNC: 1.04 NG/DL (ref 0.92–1.68)

## 2024-07-30 PROCEDURE — 1159F MED LIST DOCD IN RCRD: CPT | Performed by: NURSE PRACTITIONER

## 2024-07-30 PROCEDURE — 1125F AMNT PAIN NOTED PAIN PRSNT: CPT | Performed by: NURSE PRACTITIONER

## 2024-07-30 PROCEDURE — 1160F RVW MEDS BY RX/DR IN RCRD: CPT | Performed by: NURSE PRACTITIONER

## 2024-07-30 PROCEDURE — 36415 COLL VENOUS BLD VENIPUNCTURE: CPT

## 2024-07-30 PROCEDURE — 3080F DIAST BP >= 90 MM HG: CPT | Performed by: NURSE PRACTITIONER

## 2024-07-30 PROCEDURE — 84439 ASSAY OF FREE THYROXINE: CPT | Performed by: NURSE PRACTITIONER

## 2024-07-30 PROCEDURE — 84481 FREE ASSAY (FT-3): CPT | Performed by: NURSE PRACTITIONER

## 2024-07-30 PROCEDURE — 99214 OFFICE O/P EST MOD 30 MIN: CPT | Performed by: NURSE PRACTITIONER

## 2024-07-30 PROCEDURE — 3075F SYST BP GE 130 - 139MM HG: CPT | Performed by: NURSE PRACTITIONER

## 2024-07-30 RX ORDER — LACTULOSE 10 G/15ML
20 SOLUTION ORAL DAILY PRN
Qty: 273 ML | Refills: 0 | Status: SHIPPED | OUTPATIENT
Start: 2024-07-30

## 2024-07-30 RX ORDER — SERTRALINE HYDROCHLORIDE 25 MG/1
25 TABLET, FILM COATED ORAL DAILY
Qty: 90 TABLET | Refills: 1 | Status: SHIPPED | OUTPATIENT
Start: 2024-07-30

## 2024-07-30 NOTE — PROGRESS NOTES
Subjective     Cindy Thompson is a 33 y.o. female.     History of Present Illness  Pt is here today for a 1 mo follow up on depression  She was started on zoloft 25mg daily.   Pt reports that her symptoms have been improving.   She feels that her depressive episodes have been decreasing and motivation has increased.   Denies SI or HI.   She feels that this is a good dose for her.   She has been having some constipation recently.   She went to the ED on Friday for the constipation and abdominal pain.    Her last BM was last Thursday.   She has been taking docusate and preparation H that she received from her ED visit.   She tried an enema as well but it was too painful and she could not do it.   She has been trying to drink plenty of water and apple juice.   States that she still has not had a bowel movement.   Her abdominal pain is still present but not as severe.   It is located in the lower quadrants.   She takes advil prn- 2 times a day.   She also has been having some pain in her legs that is new since being constipated.   The pain is dull and constant.   She occasionally gets numbness in her legs but this is not necessarily new.   She has had constipation issues before.   She notices that her constipation is worse when she eats dairy.   She recently made something with cheese and that's when she noticed the constipation starting.   She is wondering if she could be tested for lactose intolerant.   Has never seen GI before.        The following portions of the patient's history were reviewed and updated as appropriate: allergies, current medications, past family history, past medical history, past social history, past surgical history, and problem list.    Review of Systems   Constitutional:  Negative for chills, fatigue and fever.   Eyes:  Negative for blurred vision and double vision.   Respiratory:  Negative for chest tightness, shortness of breath and wheezing.    Cardiovascular:  Negative for chest pain and  "palpitations.   Gastrointestinal:  Positive for abdominal pain (lower quadrants. pain has mostly resolved today), constipation and nausea (with constipation).   Skin:  Negative for rash and skin lesions.   Neurological:  Positive for headache (with constipation episode). Negative for dizziness and light-headedness.   Psychiatric/Behavioral:  Negative for suicidal ideas and depressed mood. The patient is not nervous/anxious.        Objective     /95 (BP Location: Left arm, Patient Position: Sitting, Cuff Size: Large Adult)   Pulse 85   Temp 97.8 °F (36.6 °C) (Temporal)   Ht 160 cm (63\")   Wt (!) 147 kg (323 lb 12.8 oz)   SpO2 95%   BMI 57.36 kg/m²     Current Outpatient Medications on File Prior to Visit   Medication Sig Dispense Refill    cetirizine (zyrTEC) 10 MG tablet Take 1 tablet by mouth Daily. 90 tablet 1    docusate sodium (COLACE) 100 MG capsule Take 1 capsule by mouth 2 (Two) Times a Day As Needed for Constipation for up to 10 days. 10 capsule 0    Hydrocortisone 2 % cream Apply 1 Application topically 2 (Two) Times a Day As Needed (rash on arms). 28 g 1    mupirocin (BACTROBAN) 2 % ointment Apply a small amount to open wounds 2 times daily for 5 days if outbreak 30 g 1    phenylephrine-mineral oil-petrolatum (Preparation H) 0.25-14-74.9 % ointment hemorrhoidal ointment Insert 1 Application into the rectum 3 (Three) Times a Day As Needed (rectal pain). 1 each 0    [DISCONTINUED] sertraline (Zoloft) 25 MG tablet Take 1 tablet by mouth Daily. 30 tablet 0     No current facility-administered medications on file prior to visit.                 Physical Exam  Constitutional:       General: She is not in acute distress.     Appearance: Normal appearance. She is not ill-appearing.   HENT:      Head: Normocephalic and atraumatic.   Eyes:      Extraocular Movements: Extraocular movements intact.   Cardiovascular:      Rate and Rhythm: Normal rate and regular rhythm.      Heart sounds: No murmur " heard.  Pulmonary:      Effort: Pulmonary effort is normal. No respiratory distress.   Abdominal:      General: There is no distension.      Palpations: Abdomen is soft.      Tenderness: There is no abdominal tenderness.   Skin:     General: Skin is warm and dry.   Neurological:      General: No focal deficit present.      Mental Status: She is alert and oriented to person, place, and time.   Psychiatric:         Mood and Affect: Mood normal.         Behavior: Behavior normal.         Thought Content: Thought content normal.         Judgment: Judgment normal.         Assessment & Plan     Diagnoses and all orders for this visit:    1. Depression, unspecified depression type (Primary)  Comments:  improved  cont zoloft 25mg daily  denies SI or HI  Orders:  -     sertraline (Zoloft) 25 MG tablet; Take 1 tablet by mouth Daily.  Dispense: 90 tablet; Refill: 1    2. Constipation, unspecified constipation type  Comments:  woudl like to see GI  has hemorrhoids as well- preparation H  lactulose prn  reviewed CT scan  Orders:  -     Ambulatory Referral to Gastroenterology  -     lactulose (CHRONULAC) 10 GM/15ML solution; Take 30 mL by mouth Daily As Needed (constipation).  Dispense: 273 mL; Refill: 0

## 2024-07-30 NOTE — OUTREACH NOTE
AMBULATORY CASE MANAGEMENT NOTE    Names and Relationships of Patient/Support Persons: Contact: Cindy Thompson; Relationship: Self -     ACM called and spoke with patient. Identified self and roll. Explained and offered CCM program and patient declined at this time. Encouraged patient to reach out in future if needs arise.     Jennifer KHAN  Ambulatory Case Management    7/30/2024, 14:04 EDT

## 2024-08-23 ENCOUNTER — TRANSCRIBE ORDERS (OUTPATIENT)
Dept: ADMINISTRATIVE | Facility: HOSPITAL | Age: 34
End: 2024-08-23
Payer: MEDICARE

## 2024-08-23 DIAGNOSIS — C54.1 MALIGNANT NEOPLASM OF ENDOMETRIUM: Primary | ICD-10-CM

## 2024-12-04 ENCOUNTER — OFFICE (OUTPATIENT)
Age: 34
End: 2024-12-04
Payer: MEDICARE

## 2024-12-04 ENCOUNTER — OFFICE (OUTPATIENT)
Dept: URBAN - METROPOLITAN AREA CLINIC 64 | Facility: CLINIC | Age: 34
End: 2024-12-04
Payer: MEDICARE

## 2024-12-04 VITALS
DIASTOLIC BLOOD PRESSURE: 80 MMHG | HEART RATE: 67 BPM | HEIGHT: 64 IN | DIASTOLIC BLOOD PRESSURE: 80 MMHG | HEART RATE: 67 BPM | SYSTOLIC BLOOD PRESSURE: 124 MMHG | HEART RATE: 67 BPM | SYSTOLIC BLOOD PRESSURE: 124 MMHG | DIASTOLIC BLOOD PRESSURE: 80 MMHG | SYSTOLIC BLOOD PRESSURE: 124 MMHG | HEART RATE: 67 BPM | WEIGHT: 293 LBS | HEIGHT: 64 IN | DIASTOLIC BLOOD PRESSURE: 80 MMHG | WEIGHT: 293 LBS | DIASTOLIC BLOOD PRESSURE: 80 MMHG | HEART RATE: 67 BPM | HEIGHT: 64 IN | HEIGHT: 64 IN | HEART RATE: 67 BPM | DIASTOLIC BLOOD PRESSURE: 80 MMHG | WEIGHT: 293 LBS | HEIGHT: 64 IN | DIASTOLIC BLOOD PRESSURE: 80 MMHG | SYSTOLIC BLOOD PRESSURE: 124 MMHG | WEIGHT: 293 LBS | HEART RATE: 67 BPM | HEIGHT: 64 IN | SYSTOLIC BLOOD PRESSURE: 124 MMHG | HEIGHT: 64 IN | WEIGHT: 293 LBS | SYSTOLIC BLOOD PRESSURE: 124 MMHG | SYSTOLIC BLOOD PRESSURE: 124 MMHG | WEIGHT: 293 LBS | WEIGHT: 293 LBS

## 2024-12-04 DIAGNOSIS — K59.00 CONSTIPATION, UNSPECIFIED: ICD-10-CM

## 2024-12-04 DIAGNOSIS — R10.84 GENERALIZED ABDOMINAL PAIN: ICD-10-CM

## 2024-12-04 PROCEDURE — 99203 OFFICE O/P NEW LOW 30 MIN: CPT

## 2024-12-04 RX ORDER — DICYCLOMINE HYDROCHLORIDE 10 MG/1
40 CAPSULE ORAL
Qty: 60 | Refills: 11 | Status: ACTIVE
Start: 2024-12-04

## 2025-01-09 ENCOUNTER — OFFICE VISIT (OUTPATIENT)
Dept: PULMONOLOGY | Facility: HOSPITAL | Age: 35
End: 2025-01-09
Payer: MEDICARE

## 2025-01-09 VITALS
BODY MASS INDEX: 51.91 KG/M2 | SYSTOLIC BLOOD PRESSURE: 132 MMHG | HEART RATE: 77 BPM | OXYGEN SATURATION: 93 % | RESPIRATION RATE: 14 BRPM | HEIGHT: 63 IN | DIASTOLIC BLOOD PRESSURE: 68 MMHG | WEIGHT: 293 LBS

## 2025-01-09 DIAGNOSIS — G47.33 OSA (OBSTRUCTIVE SLEEP APNEA): Primary | ICD-10-CM

## 2025-01-09 PROCEDURE — G0463 HOSPITAL OUTPT CLINIC VISIT: HCPCS

## 2025-01-09 RX ORDER — IBUPROFEN 200 MG
200 TABLET ORAL EVERY 6 HOURS PRN
COMMUNITY

## 2025-01-09 RX ORDER — DICYCLOMINE HYDROCHLORIDE 10 MG/1
10 CAPSULE ORAL AS NEEDED
COMMUNITY
Start: 2024-12-04

## 2025-01-09 RX ORDER — ROSUVASTATIN CALCIUM 10 MG/1
10 TABLET, COATED ORAL NIGHTLY
COMMUNITY
Start: 2024-12-06

## 2025-01-09 NOTE — PROGRESS NOTES
PULMONARY/ CRITICAL CARE/ SLEEP MEDICINE OUTPATIENT CONSULT/ FOLLOW UP NOTE        Patient Name:  Cindy Thompson    :  1990    Medical Record:  [id number]    PRIMARY CARE PHYSICIAN     Shantal Savage APRN    REASON FOR CONSULTATION    Cindy Thompson is a 34 y.o. female who is referred for consultation for BEBA  REVIEW OF SYSTEMS    Constitutional:  Denies fever or chills   Eyes:  Denies change in visual acuity   HENT:  Denies nasal congestion or sore throat   Respiratory:  Denies cough or shortness of breath   Cardiovascular:  Denies chest pain or edema   GI:  Denies abdominal pain, nausea, vomiting, bloody stools or diarrhea   :  Denies dysuria   Musculoskeletal:  Denies back pain or joint pain   Integument:  Denies rash   Neurologic:  Denies headache, focal weakness or sensory changes   Endocrine:  Denies polyuria or polydipsia   Lymphatic:  Denies swollen glands   Psychiatric:  Denies depression or anxiety     MEDICAL HISTORY    Past Medical History:   Diagnosis Date    Anesthesia complication     lips swollen after surgeries, ? mouth gaurd, has trouble with respirations during surgery    Depression     Disease of thyroid gland     Hypertension     Learning disabilities     Low back pain     Obesity     BEAB (obstructive sleep apnea)     uses CPAP    Snoring     Uterine cancer     [facility]- Dr. Eva Belle        SURGICAL HISTORY    Past Surgical History:   Procedure Laterality Date    DILATATION AND CURETTAGE      ENDOSCOPY N/A 2020    Procedure: ESOPHAGOGASTRODUODENOSCOPY WITH BIOPSY,;  Surgeon: Elle Bill MD;  Location: [facility] ENDOSCOPY;  Service: General;  Laterality: N/A;    GASTRIC SLEEVE LAPAROSCOPIC N/A 2020    Procedure: GASTRIC SLEEVE LAPAROSCOPIC;  Surgeon: Elle Bill MD;  Location: [facility] MAIN OR;  Service: Bariatric;  Laterality: N/A;        FAMILY HISTORY    Family History   Problem Relation Age of Onset    Cancer Father     Hyperlipidemia Father     Stroke Father     Arthritis  Father         N/A    Depression Sister         N/A    Learning disabilities Sister     Diabetes Maternal Grandmother     Cancer Maternal Grandmother         N/A    Heart attack Maternal Grandfather     Heart disease Maternal Grandfather     Cancer Paternal Grandmother         Bone Cancer    Emphysema Paternal Grandfather     Arthritis Mother         N/A    Early death Brother         He  in his crib    Miscarriages / Stillbirths Sister         Miscarriages       SOCIAL HISTORY    Social History     Tobacco Use    Smoking status: Former     Current packs/day: 0.00     Average packs/day: 0.3 packs/day for 1 year (0.3 ttl pk-yrs)     Types: Cigarettes     Start date:      Quit date:      Years since quittin.0     Passive exposure: Past    Smokeless tobacco: Never   Substance Use Topics    Alcohol use: Yes     Comment: social        ALLERGIES    No Known Allergies      MEDICATIONS    Current Outpatient Medications on File Prior to Visit   Medication Sig Dispense Refill    dicyclomine (BENTYL) 10 MG capsule Take 1 capsule by mouth As Needed for Abdominal Cramping.      ibuprofen (Advil) 200 MG tablet Take 1 tablet by mouth Every 6 (Six) Hours As Needed for Moderate Pain.      rosuvastatin (CRESTOR) 10 MG tablet Take 1 tablet by mouth Every Night.      [DISCONTINUED] cetirizine (zyrTEC) 10 MG tablet Take 1 tablet by mouth Daily. 90 tablet 1    [DISCONTINUED] Hydrocortisone 2 % cream Apply 1 Application topically 2 (Two) Times a Day As Needed (rash on arms). 28 g 1    [DISCONTINUED] lactulose (CHRONULAC) 10 GM/15ML solution Take 30 mL by mouth Daily As Needed (constipation). 273 mL 0    [DISCONTINUED] mupirocin (BACTROBAN) 2 % ointment Apply a small amount to open wounds 2 times daily for 5 days if outbreak 30 g 1    [DISCONTINUED] phenylephrine-mineral oil-petrolatum (Preparation H) 0.25-14-74.9 % ointment hemorrhoidal ointment Insert 1 Application into the rectum 3 (Three) Times a Day As Needed (rectal  "pain). 1 each 0    [DISCONTINUED] sertraline (Zoloft) 25 MG tablet Take 1 tablet by mouth Daily. 90 tablet 1     No current facility-administered medications on file prior to visit.       PHYSICAL EXAM    Vitals:    01/09/25 0917   BP: 132/68   BP Location: Left arm   Patient Position: Sitting   Cuff Size: Large Adult   Pulse: 77   Resp: 14   SpO2: 93%   Weight: (!) 151 kg (332 lb)   Height: 160 cm (63\")        Constitutional:  Well developed, well nourished, no acute distress, non-toxic appearance   Eyes:  PERRL, conjunctiva normal   HENT:  Atraumatic, external ears normal, nose normal, oropharynx moist, no pharyngeal exudates. mallampatti   Neck- normal range of motion, no tenderness, supple   Respiratory:  No respiratory distress, normal breath sounds, no rales, no wheezing   Cardiovascular:  Normal rate, normal rhythm, no murmurs, no gallops, no rubs   GI:  Soft, nondistended, normal bowel sounds, nontender, no organomegaly, no mass, no rebound, no guarding   :  No costovertebral angle tenderness   Musculoskeletal:  No edema, no tenderness, no deformities. Back- no tenderness  Integument:  Well hydrated, no rash   Lymphatic:  No lymphadenopathy noted   Neurologic:  Alert & oriented x 3, CN 2-12 normal, normal motor function, normal sensory function, no focal deficits noted   Psychiatric:  Speech and behavior appropriate     No radiology results for the last 90 days.   Results for orders placed during the hospital encounter of 11/18/19    Adult Transthoracic Echo Complete W/ Cont if Necessary Per Protocol    Interpretation Summary  TDS:  Overall LV/RV size and function normal  No significant valvulopathy seen in limited suboptimal views mainly ascertained by Doppler assessment  IVC not seen  Overall valvular morphology is not well seen  Suboptimal study but grossly LV and RV function are normal      ASSESSMENT & PLAN:      34-year-old female with history of severe obstructive sleep apnea in 2019 split study " showed AHI 66 at that time patient could not tolerate the CPAP    The plan since patient continued to have excessive daytime sleepiness loud snoring witnessed apneas we will proceed with a split study and BiPAP titration    This document has been electronically signed by  Ap Escalante MD  09:41 EST

## 2025-03-07 ENCOUNTER — TRANSCRIBE ORDERS (OUTPATIENT)
Dept: ADMINISTRATIVE | Facility: HOSPITAL | Age: 35
End: 2025-03-07
Payer: MEDICARE

## 2025-03-07 DIAGNOSIS — Z87.442 HISTORY OF URINARY CALCULI: Primary | ICD-10-CM

## 2025-08-11 ENCOUNTER — HOSPITAL ENCOUNTER (OUTPATIENT)
Dept: CT IMAGING | Facility: HOSPITAL | Age: 35
Discharge: HOME | End: 2025-08-11
Admitting: OBSTETRICS & GYNECOLOGY
Payer: MEDICARE

## 2025-08-11 DIAGNOSIS — C54.1 MALIGNANT NEOPLASM OF ENDOMETRIUM: ICD-10-CM

## 2025-08-11 PROCEDURE — 74177 CT ABD & PELVIS W/CONTRAST: CPT

## 2025-08-11 PROCEDURE — 25510000001 IOPAMIDOL PER 1 ML: Performed by: OBSTETRICS & GYNECOLOGY

## 2025-08-11 PROCEDURE — 71260 CT THORAX DX C+: CPT

## 2025-08-11 RX ORDER — IOPAMIDOL 755 MG/ML
100 INJECTION, SOLUTION INTRAVASCULAR
Status: COMPLETED | OUTPATIENT
Start: 2025-08-11 | End: 2025-08-11

## 2025-08-11 RX ADMIN — IOPAMIDOL 100 ML: 755 INJECTION, SOLUTION INTRAVENOUS at 08:58

## (undated) DEVICE — LAPAROSCOPIC GAS CONDITIONING DEVICE.: Brand: INSUFLOW

## (undated) DEVICE — TROCAR: Brand: KII OPTICAL ACCESS SYSTEM

## (undated) DEVICE — GLV SURG SIGNATURE ESSENTIAL PF LTX SZ7.5

## (undated) DEVICE — BITEBLOCK ENDO W/STRAP 60F A/ LF DISP

## (undated) DEVICE — UNDERGLV SURG BIOGEL INDICAT PF 8 GRN

## (undated) DEVICE — LAPAROSCOPIC DISSECTOR: Brand: DEROYAL

## (undated) DEVICE — 40580 - THE PINK PAD - ADVANCED TRENDELENBURG POSITIONING KIT: Brand: 40580 - THE PINK PAD - ADVANCED TRENDELENBURG POSITIONING KIT

## (undated) DEVICE — VIOLET BRAIDED (POLYGLACTIN 910), SYNTHETIC ABSORBABLE SUTURE: Brand: COATED VICRYL

## (undated) DEVICE — UNIVERSAL PLUS LAPAROSCOPIC ELECTRODE WITH ESCHAR-RESISTANT COATING - SUCTION/IRRIGATION, L-HOOK 5 MM X 44 CM: Brand: UNIVERSAL PLUS

## (undated) DEVICE — CLMP STD 22CM DISP

## (undated) DEVICE — PK ENDO GI 50

## (undated) DEVICE — KT SURG TURNOVER 050

## (undated) DEVICE — PASS SUT PRO BARIATRIC XL W/TROC SWABS

## (undated) DEVICE — SYRINGE,TOOMEY,IRRIGATION,70CC,STERILE: Brand: MEDLINE

## (undated) DEVICE — ENDOPATH XCEL BLADELESS TROCARS WITH STABILITY SLEEVES: Brand: ENDOPATH XCEL

## (undated) DEVICE — ECHELON FLEX POWERED PLUS LONG ARTICULATING ENDOSCOPIC LINEAR CUTTER, 60MM: Brand: ECHELON FLEX

## (undated) DEVICE — APL DUPLOSPRAYER MIS 40CM

## (undated) DEVICE — PAPR PRNT PK SONY W RIBN UPC55

## (undated) DEVICE — SINGLE-USE BIOPSY FORCEPS: Brand: RADIAL JAW 4

## (undated) DEVICE — ENDOPATH XCEL WITH OPTIVIEW TECHNOLOGY BLADELESS TROCARS WITH STABILITY SLEEVES: Brand: ENDOPATH XCEL OPTIVIEW

## (undated) DEVICE — CUFF SCD HEMOFORCE SEQ CALF STD MD

## (undated) DEVICE — SOL NACL 0.9PCT 1000ML

## (undated) DEVICE — PK BARIATRIC 50

## (undated) DEVICE — ENSEAL LAPAROSCOPIC TISSUE SEALER G2 ARTICULATING CURVED JAW FOR USE WITH G2 GENERATOR 5MM DIAMETER 45CM SHAFT LENGTH: Brand: ENSEAL